# Patient Record
Sex: MALE | Race: BLACK OR AFRICAN AMERICAN | NOT HISPANIC OR LATINO
[De-identification: names, ages, dates, MRNs, and addresses within clinical notes are randomized per-mention and may not be internally consistent; named-entity substitution may affect disease eponyms.]

---

## 2017-02-15 ENCOUNTER — OTHER (OUTPATIENT)
Age: 47
End: 2017-02-15

## 2017-02-15 ENCOUNTER — APPOINTMENT (OUTPATIENT)
Dept: INTERNAL MEDICINE | Facility: CLINIC | Age: 47
End: 2017-02-15

## 2017-02-15 VITALS
OXYGEN SATURATION: 98 % | SYSTOLIC BLOOD PRESSURE: 144 MMHG | DIASTOLIC BLOOD PRESSURE: 80 MMHG | TEMPERATURE: 98.4 F | HEART RATE: 94 BPM | HEIGHT: 73 IN | BODY MASS INDEX: 41.75 KG/M2 | WEIGHT: 315 LBS

## 2017-02-17 LAB
GLUCOSE BLDC GLUCOMTR-MCNC: 176
HBA1C MFR BLD HPLC: 11.1

## 2017-03-13 ENCOUNTER — MEDICATION RENEWAL (OUTPATIENT)
Age: 47
End: 2017-03-13

## 2017-04-05 ENCOUNTER — APPOINTMENT (OUTPATIENT)
Dept: INTERNAL MEDICINE | Facility: CLINIC | Age: 47
End: 2017-04-05

## 2017-04-05 VITALS
HEART RATE: 86 BPM | DIASTOLIC BLOOD PRESSURE: 80 MMHG | WEIGHT: 315 LBS | OXYGEN SATURATION: 98 % | HEIGHT: 73 IN | SYSTOLIC BLOOD PRESSURE: 140 MMHG | BODY MASS INDEX: 41.75 KG/M2

## 2017-04-05 DIAGNOSIS — N52.9 MALE ERECTILE DYSFUNCTION, UNSPECIFIED: ICD-10-CM

## 2017-04-14 ENCOUNTER — MEDICATION RENEWAL (OUTPATIENT)
Age: 47
End: 2017-04-14

## 2017-05-26 ENCOUNTER — MEDICATION RENEWAL (OUTPATIENT)
Age: 47
End: 2017-05-26

## 2017-05-26 ENCOUNTER — RX RENEWAL (OUTPATIENT)
Age: 47
End: 2017-05-26

## 2017-05-31 ENCOUNTER — APPOINTMENT (OUTPATIENT)
Dept: INTERNAL MEDICINE | Facility: CLINIC | Age: 47
End: 2017-05-31

## 2017-05-31 VITALS
OXYGEN SATURATION: 98 % | TEMPERATURE: 98.7 F | BODY MASS INDEX: 41.75 KG/M2 | HEART RATE: 88 BPM | SYSTOLIC BLOOD PRESSURE: 140 MMHG | DIASTOLIC BLOOD PRESSURE: 80 MMHG | WEIGHT: 315 LBS | HEIGHT: 73 IN | RESPIRATION RATE: 16 BRPM

## 2017-06-02 LAB
GLUCOSE BLDC GLUCOMTR-MCNC: 312
HBA1C MFR BLD HPLC: 12.7

## 2017-06-28 ENCOUNTER — MEDICATION RENEWAL (OUTPATIENT)
Age: 47
End: 2017-06-28

## 2017-08-02 ENCOUNTER — OTHER (OUTPATIENT)
Age: 47
End: 2017-08-02

## 2017-08-02 ENCOUNTER — APPOINTMENT (OUTPATIENT)
Dept: INTERNAL MEDICINE | Facility: CLINIC | Age: 47
End: 2017-08-02
Payer: COMMERCIAL

## 2017-08-02 VITALS
BODY MASS INDEX: 41.75 KG/M2 | DIASTOLIC BLOOD PRESSURE: 80 MMHG | HEART RATE: 94 BPM | OXYGEN SATURATION: 97 % | WEIGHT: 315 LBS | SYSTOLIC BLOOD PRESSURE: 124 MMHG | HEIGHT: 73 IN

## 2017-08-02 PROCEDURE — 99213 OFFICE O/P EST LOW 20 MIN: CPT

## 2017-08-22 ENCOUNTER — MEDICATION RENEWAL (OUTPATIENT)
Age: 47
End: 2017-08-22

## 2017-09-11 ENCOUNTER — APPOINTMENT (OUTPATIENT)
Dept: INTERNAL MEDICINE | Facility: CLINIC | Age: 47
End: 2017-09-11
Payer: COMMERCIAL

## 2017-09-11 VITALS
OXYGEN SATURATION: 98 % | DIASTOLIC BLOOD PRESSURE: 80 MMHG | TEMPERATURE: 98.8 F | SYSTOLIC BLOOD PRESSURE: 130 MMHG | HEIGHT: 73 IN | WEIGHT: 315 LBS | BODY MASS INDEX: 41.75 KG/M2 | HEART RATE: 85 BPM

## 2017-09-11 PROCEDURE — 99213 OFFICE O/P EST LOW 20 MIN: CPT | Mod: 25

## 2017-09-11 PROCEDURE — 83036 HEMOGLOBIN GLYCOSYLATED A1C: CPT | Mod: QW

## 2017-09-11 PROCEDURE — 82962 GLUCOSE BLOOD TEST: CPT

## 2017-09-12 LAB
GLUCOSE BLDC GLUCOMTR-MCNC: 186
HBA1C MFR BLD HPLC: 10.6

## 2017-11-01 ENCOUNTER — APPOINTMENT (OUTPATIENT)
Dept: INTERNAL MEDICINE | Facility: CLINIC | Age: 47
End: 2017-11-01
Payer: COMMERCIAL

## 2017-11-01 PROCEDURE — 36415 COLL VENOUS BLD VENIPUNCTURE: CPT

## 2017-11-15 ENCOUNTER — NON-APPOINTMENT (OUTPATIENT)
Age: 47
End: 2017-11-15

## 2017-11-15 ENCOUNTER — APPOINTMENT (OUTPATIENT)
Dept: INTERNAL MEDICINE | Facility: CLINIC | Age: 47
End: 2017-11-15
Payer: COMMERCIAL

## 2017-11-15 VITALS
HEIGHT: 73 IN | HEART RATE: 91 BPM | OXYGEN SATURATION: 97 % | TEMPERATURE: 98.7 F | SYSTOLIC BLOOD PRESSURE: 130 MMHG | WEIGHT: 315 LBS | BODY MASS INDEX: 41.75 KG/M2 | DIASTOLIC BLOOD PRESSURE: 70 MMHG

## 2017-11-15 PROCEDURE — G0008: CPT

## 2017-11-15 PROCEDURE — 36415 COLL VENOUS BLD VENIPUNCTURE: CPT

## 2017-11-15 PROCEDURE — 94010 BREATHING CAPACITY TEST: CPT

## 2017-11-15 PROCEDURE — 90688 IIV4 VACCINE SPLT 0.5 ML IM: CPT

## 2017-11-15 PROCEDURE — 83036 HEMOGLOBIN GLYCOSYLATED A1C: CPT | Mod: QW

## 2017-11-15 PROCEDURE — 93000 ELECTROCARDIOGRAM COMPLETE: CPT

## 2017-11-15 PROCEDURE — 92552 PURE TONE AUDIOMETRY AIR: CPT

## 2017-11-15 PROCEDURE — 99396 PREV VISIT EST AGE 40-64: CPT | Mod: 25

## 2017-11-15 PROCEDURE — 82043 UR ALBUMIN QUANTITATIVE: CPT | Mod: QW

## 2017-11-17 ENCOUNTER — CLINICAL ADVICE (OUTPATIENT)
Age: 47
End: 2017-11-17

## 2017-11-17 ENCOUNTER — APPOINTMENT (OUTPATIENT)
Dept: INTERNAL MEDICINE | Facility: CLINIC | Age: 47
End: 2017-11-17
Payer: COMMERCIAL

## 2017-11-17 PROCEDURE — 86580 TB INTRADERMAL TEST: CPT

## 2017-12-07 ENCOUNTER — RX RENEWAL (OUTPATIENT)
Age: 47
End: 2017-12-07

## 2018-01-03 ENCOUNTER — APPOINTMENT (OUTPATIENT)
Dept: INTERNAL MEDICINE | Facility: CLINIC | Age: 48
End: 2018-01-03
Payer: COMMERCIAL

## 2018-01-03 ENCOUNTER — APPOINTMENT (OUTPATIENT)
Dept: INTERNAL MEDICINE | Facility: CLINIC | Age: 48
End: 2018-01-03

## 2018-01-04 ENCOUNTER — MEDICATION RENEWAL (OUTPATIENT)
Age: 48
End: 2018-01-04

## 2018-01-17 ENCOUNTER — OTHER (OUTPATIENT)
Age: 48
End: 2018-01-17

## 2018-01-17 ENCOUNTER — APPOINTMENT (OUTPATIENT)
Dept: INTERNAL MEDICINE | Facility: CLINIC | Age: 48
End: 2018-01-17
Payer: COMMERCIAL

## 2018-01-17 VITALS
OXYGEN SATURATION: 97 % | BODY MASS INDEX: 41.75 KG/M2 | WEIGHT: 315 LBS | DIASTOLIC BLOOD PRESSURE: 90 MMHG | HEART RATE: 80 BPM | SYSTOLIC BLOOD PRESSURE: 130 MMHG | HEIGHT: 73 IN

## 2018-01-17 PROCEDURE — 83036 HEMOGLOBIN GLYCOSYLATED A1C: CPT | Mod: QW

## 2018-01-17 PROCEDURE — 99213 OFFICE O/P EST LOW 20 MIN: CPT

## 2018-01-17 PROCEDURE — 82962 GLUCOSE BLOOD TEST: CPT

## 2018-01-18 LAB — GLUCOSE BLDC GLUCOMTR-MCNC: 215

## 2018-01-26 LAB — HBA1C MFR BLD HPLC: 10

## 2018-03-06 ENCOUNTER — APPOINTMENT (OUTPATIENT)
Dept: INTERNAL MEDICINE | Facility: CLINIC | Age: 48
End: 2018-03-06
Payer: COMMERCIAL

## 2018-03-06 ENCOUNTER — APPOINTMENT (OUTPATIENT)
Dept: DERMATOLOGY | Facility: CLINIC | Age: 48
End: 2018-03-06

## 2018-03-06 VITALS
SYSTOLIC BLOOD PRESSURE: 140 MMHG | TEMPERATURE: 98.4 F | BODY MASS INDEX: 41.75 KG/M2 | DIASTOLIC BLOOD PRESSURE: 84 MMHG | OXYGEN SATURATION: 97 % | HEART RATE: 80 BPM | HEIGHT: 73 IN | WEIGHT: 315 LBS

## 2018-03-06 PROCEDURE — 99214 OFFICE O/P EST MOD 30 MIN: CPT

## 2018-03-07 ENCOUNTER — OTHER (OUTPATIENT)
Age: 48
End: 2018-03-07

## 2018-03-21 ENCOUNTER — APPOINTMENT (OUTPATIENT)
Dept: DERMATOLOGY | Facility: CLINIC | Age: 48
End: 2018-03-21
Payer: COMMERCIAL

## 2018-03-21 PROCEDURE — 99203 OFFICE O/P NEW LOW 30 MIN: CPT

## 2018-04-04 RX ORDER — TRETINOIN 0.25 MG/G
0.03 CREAM TOPICAL DAILY
Qty: 1 | Refills: 2 | Status: ACTIVE | COMMUNITY
Start: 2018-03-21

## 2018-04-09 ENCOUNTER — OTHER (OUTPATIENT)
Age: 48
End: 2018-04-09

## 2018-04-09 ENCOUNTER — APPOINTMENT (OUTPATIENT)
Dept: INTERNAL MEDICINE | Facility: CLINIC | Age: 48
End: 2018-04-09
Payer: COMMERCIAL

## 2018-04-09 VITALS
OXYGEN SATURATION: 98 % | HEIGHT: 73 IN | SYSTOLIC BLOOD PRESSURE: 150 MMHG | HEART RATE: 90 BPM | WEIGHT: 315 LBS | BODY MASS INDEX: 41.75 KG/M2 | DIASTOLIC BLOOD PRESSURE: 90 MMHG

## 2018-04-09 PROCEDURE — 83036 HEMOGLOBIN GLYCOSYLATED A1C: CPT | Mod: QW

## 2018-04-09 PROCEDURE — 82962 GLUCOSE BLOOD TEST: CPT

## 2018-04-09 PROCEDURE — 99214 OFFICE O/P EST MOD 30 MIN: CPT | Mod: 25

## 2018-04-10 LAB
GLUCOSE BLDC GLUCOMTR-MCNC: 229
HBA1C MFR BLD HPLC: 10.9

## 2018-04-11 ENCOUNTER — APPOINTMENT (OUTPATIENT)
Dept: DERMATOLOGY | Facility: CLINIC | Age: 48
End: 2018-04-11
Payer: COMMERCIAL

## 2018-04-11 DIAGNOSIS — L73.9 FOLLICULAR DISORDER, UNSPECIFIED: ICD-10-CM

## 2018-04-11 DIAGNOSIS — L81.9 DISORDER OF PIGMENTATION, UNSPECIFIED: ICD-10-CM

## 2018-04-11 PROCEDURE — 99213 OFFICE O/P EST LOW 20 MIN: CPT

## 2018-04-11 RX ORDER — HYDROCORTISONE 1 %
12 CREAM (GRAM) TOPICAL
Refills: 0 | Status: ACTIVE | COMMUNITY

## 2018-04-12 ENCOUNTER — CHART COPY (OUTPATIENT)
Age: 48
End: 2018-04-12

## 2018-04-16 ENCOUNTER — CHART COPY (OUTPATIENT)
Age: 48
End: 2018-04-16

## 2018-04-23 ENCOUNTER — OTHER (OUTPATIENT)
Age: 48
End: 2018-04-23

## 2018-05-07 ENCOUNTER — RX RENEWAL (OUTPATIENT)
Age: 48
End: 2018-05-07

## 2018-05-09 ENCOUNTER — MEDICATION RENEWAL (OUTPATIENT)
Age: 48
End: 2018-05-09

## 2018-05-18 ENCOUNTER — APPOINTMENT (OUTPATIENT)
Dept: INTERNAL MEDICINE | Facility: CLINIC | Age: 48
End: 2018-05-18
Payer: COMMERCIAL

## 2018-05-18 VITALS
WEIGHT: 315 LBS | OXYGEN SATURATION: 97 % | SYSTOLIC BLOOD PRESSURE: 134 MMHG | HEART RATE: 86 BPM | BODY MASS INDEX: 41.75 KG/M2 | TEMPERATURE: 97.8 F | HEIGHT: 73 IN | DIASTOLIC BLOOD PRESSURE: 80 MMHG

## 2018-05-18 PROCEDURE — G0447 BEHAVIOR COUNSEL OBESITY 15M: CPT

## 2018-05-18 PROCEDURE — 99214 OFFICE O/P EST MOD 30 MIN: CPT | Mod: 25

## 2018-05-18 RX ORDER — CEPHALEXIN 500 MG/1
500 TABLET ORAL
Qty: 30 | Refills: 0 | Status: COMPLETED | COMMUNITY
Start: 2018-02-21

## 2018-05-18 RX ORDER — AMMONIUM LACTATE 12 %
12 CREAM (GRAM) TOPICAL
Qty: 280 | Refills: 0 | Status: COMPLETED | COMMUNITY
Start: 2018-02-27

## 2018-05-18 RX ORDER — FLUOCINONIDE 1 MG/G
0.1 CREAM TOPICAL
Qty: 60 | Refills: 0 | Status: COMPLETED | COMMUNITY
Start: 2018-02-22

## 2018-05-18 RX ORDER — TADALAFIL 10 MG/1
10 TABLET, FILM COATED ORAL
Qty: 18 | Refills: 0 | Status: COMPLETED | COMMUNITY
Start: 2018-01-03 | End: 1900-01-01

## 2018-06-20 ENCOUNTER — RX RENEWAL (OUTPATIENT)
Age: 48
End: 2018-06-20

## 2018-07-02 ENCOUNTER — APPOINTMENT (OUTPATIENT)
Dept: INTERNAL MEDICINE | Facility: CLINIC | Age: 48
End: 2018-07-02

## 2018-07-10 ENCOUNTER — APPOINTMENT (OUTPATIENT)
Dept: INTERNAL MEDICINE | Facility: CLINIC | Age: 48
End: 2018-07-10
Payer: COMMERCIAL

## 2018-07-10 VITALS
HEART RATE: 88 BPM | BODY MASS INDEX: 41.75 KG/M2 | OXYGEN SATURATION: 97 % | DIASTOLIC BLOOD PRESSURE: 60 MMHG | WEIGHT: 315 LBS | HEIGHT: 73 IN | SYSTOLIC BLOOD PRESSURE: 130 MMHG

## 2018-07-10 PROCEDURE — 99214 OFFICE O/P EST MOD 30 MIN: CPT

## 2018-07-10 NOTE — PHYSICAL EXAM
[No Acute Distress] : no acute distress [Normal Sclera/Conjunctiva] : normal sclera/conjunctiva [Normal Outer Ear/Nose] : the outer ears and nose were normal in appearance [No JVD] : no jugular venous distention [No Respiratory Distress] : no respiratory distress  [Clear to Auscultation] : lungs were clear to auscultation bilaterally [Normal Rate] : normal rate  [Regular Rhythm] : with a regular rhythm [Alert and Oriented x3] : oriented to person, place, and time [de-identified] : mo

## 2018-07-10 NOTE — HISTORY OF PRESENT ILLNESS
[FreeTextEntry1] : f/u to DM, HTN, and peripheral edema [de-identified] : pt is doing OK. His DM is being addressed as his insulin pump had broken and he has received a new pump and is waiting to have it programmed.\par His HTN is controlled and peripheral edema is also controlled

## 2018-07-18 ENCOUNTER — APPOINTMENT (OUTPATIENT)
Dept: DERMATOLOGY | Facility: CLINIC | Age: 48
End: 2018-07-18

## 2018-08-13 ENCOUNTER — APPOINTMENT (OUTPATIENT)
Dept: INTERNAL MEDICINE | Facility: CLINIC | Age: 48
End: 2018-08-13

## 2018-09-24 ENCOUNTER — APPOINTMENT (OUTPATIENT)
Dept: INTERNAL MEDICINE | Facility: CLINIC | Age: 48
End: 2018-09-24
Payer: COMMERCIAL

## 2018-09-24 VITALS
OXYGEN SATURATION: 98 % | SYSTOLIC BLOOD PRESSURE: 130 MMHG | DIASTOLIC BLOOD PRESSURE: 86 MMHG | HEIGHT: 73 IN | BODY MASS INDEX: 40.42 KG/M2 | HEART RATE: 88 BPM | WEIGHT: 305 LBS

## 2018-09-24 DIAGNOSIS — Z23 ENCOUNTER FOR IMMUNIZATION: ICD-10-CM

## 2018-09-24 PROCEDURE — 90686 IIV4 VACC NO PRSV 0.5 ML IM: CPT

## 2018-09-24 PROCEDURE — G0008: CPT

## 2018-09-24 PROCEDURE — 83036 HEMOGLOBIN GLYCOSYLATED A1C: CPT | Mod: QW

## 2018-09-24 PROCEDURE — 82962 GLUCOSE BLOOD TEST: CPT

## 2018-09-24 PROCEDURE — 99214 OFFICE O/P EST MOD 30 MIN: CPT | Mod: 25

## 2018-09-24 NOTE — HISTORY OF PRESENT ILLNESS
[FreeTextEntry1] : f/u to DM, HTN and Peripheral edema. [de-identified] : Pt DM to be checked today, HTN is controlled and the peripheral edema is improved.

## 2018-09-24 NOTE — PHYSICAL EXAM
[No Acute Distress] : no acute distress [Normal Sclera/Conjunctiva] : normal sclera/conjunctiva [Normal Outer Ear/Nose] : the outer ears and nose were normal in appearance [No JVD] : no jugular venous distention [No Respiratory Distress] : no respiratory distress  [Clear to Auscultation] : lungs were clear to auscultation bilaterally [Normal Rate] : normal rate  [Alert and Oriented x3] : oriented to person, place, and time [de-identified] : mo

## 2018-09-25 LAB
GLUCOSE BLDC GLUCOMTR-MCNC: 430
HBA1C MFR BLD HPLC: 12.1

## 2018-11-12 ENCOUNTER — APPOINTMENT (OUTPATIENT)
Dept: INTERNAL MEDICINE | Facility: CLINIC | Age: 48
End: 2018-11-12

## 2018-11-13 ENCOUNTER — APPOINTMENT (OUTPATIENT)
Dept: DERMATOLOGY | Facility: CLINIC | Age: 48
End: 2018-11-13
Payer: COMMERCIAL

## 2018-11-13 PROCEDURE — 99213 OFFICE O/P EST LOW 20 MIN: CPT

## 2018-12-20 ENCOUNTER — APPOINTMENT (OUTPATIENT)
Dept: INTERNAL MEDICINE | Facility: CLINIC | Age: 48
End: 2018-12-20
Payer: COMMERCIAL

## 2018-12-20 VITALS
TEMPERATURE: 99.1 F | HEIGHT: 73 IN | HEART RATE: 99 BPM | WEIGHT: 298 LBS | RESPIRATION RATE: 14 BRPM | SYSTOLIC BLOOD PRESSURE: 130 MMHG | DIASTOLIC BLOOD PRESSURE: 90 MMHG | BODY MASS INDEX: 39.49 KG/M2

## 2018-12-20 PROCEDURE — 99214 OFFICE O/P EST MOD 30 MIN: CPT

## 2018-12-20 NOTE — ASSESSMENT
[FreeTextEntry1] : acute bronchitis\par levaquin\par robitussin sugar free\par fs at home have been \par better has a pump\par do not want to give steroids and affect his glucose\par breo 200/50 inhalation\par should be fine\par no work until better\par

## 2018-12-20 NOTE — PHYSICAL EXAM

## 2018-12-20 NOTE — HISTORY OF PRESENT ILLNESS
[FreeTextEntry1] : cough congestion [de-identified] : dm\par with cough congestion for 3 to 4 days\par wheezing, no fever no sob\par but works outdoors in security\par he has new pump which is making his diabetes much more manageable\par

## 2019-01-02 ENCOUNTER — APPOINTMENT (OUTPATIENT)
Dept: INTERNAL MEDICINE | Facility: CLINIC | Age: 49
End: 2019-01-02
Payer: COMMERCIAL

## 2019-01-02 VITALS
SYSTOLIC BLOOD PRESSURE: 140 MMHG | OXYGEN SATURATION: 98 % | WEIGHT: 303 LBS | BODY MASS INDEX: 40.16 KG/M2 | DIASTOLIC BLOOD PRESSURE: 90 MMHG | HEIGHT: 73 IN | HEART RATE: 79 BPM

## 2019-01-02 DIAGNOSIS — Z87.09 PERSONAL HISTORY OF OTHER DISEASES OF THE RESPIRATORY SYSTEM: ICD-10-CM

## 2019-01-02 PROCEDURE — 99214 OFFICE O/P EST MOD 30 MIN: CPT | Mod: 25

## 2019-01-02 PROCEDURE — 82962 GLUCOSE BLOOD TEST: CPT

## 2019-01-02 PROCEDURE — 83036 HEMOGLOBIN GLYCOSYLATED A1C: CPT | Mod: QW

## 2019-01-04 ENCOUNTER — CLINICAL ADVICE (OUTPATIENT)
Age: 49
End: 2019-01-04

## 2019-01-04 LAB
GLUCOSE BLDC GLUCOMTR-MCNC: 278
HBA1C MFR BLD HPLC: 13

## 2019-01-21 ENCOUNTER — RX RENEWAL (OUTPATIENT)
Age: 49
End: 2019-01-21

## 2019-02-19 ENCOUNTER — APPOINTMENT (OUTPATIENT)
Dept: INTERNAL MEDICINE | Facility: CLINIC | Age: 49
End: 2019-02-19
Payer: COMMERCIAL

## 2019-02-19 VITALS
OXYGEN SATURATION: 98 % | RESPIRATION RATE: 16 BRPM | SYSTOLIC BLOOD PRESSURE: 160 MMHG | WEIGHT: 310 LBS | HEART RATE: 91 BPM | TEMPERATURE: 97.9 F | DIASTOLIC BLOOD PRESSURE: 100 MMHG | BODY MASS INDEX: 40.9 KG/M2

## 2019-02-19 PROCEDURE — 99213 OFFICE O/P EST LOW 20 MIN: CPT

## 2019-03-13 ENCOUNTER — MEDICATION RENEWAL (OUTPATIENT)
Age: 49
End: 2019-03-13

## 2019-03-20 ENCOUNTER — RX RENEWAL (OUTPATIENT)
Age: 49
End: 2019-03-20

## 2019-04-02 ENCOUNTER — APPOINTMENT (OUTPATIENT)
Dept: INTERNAL MEDICINE | Facility: CLINIC | Age: 49
End: 2019-04-02

## 2019-06-11 ENCOUNTER — APPOINTMENT (OUTPATIENT)
Dept: INTERNAL MEDICINE | Facility: CLINIC | Age: 49
End: 2019-06-11
Payer: COMMERCIAL

## 2019-06-11 VITALS
HEART RATE: 93 BPM | DIASTOLIC BLOOD PRESSURE: 96 MMHG | HEIGHT: 73 IN | SYSTOLIC BLOOD PRESSURE: 160 MMHG | WEIGHT: 311 LBS | BODY MASS INDEX: 41.22 KG/M2 | TEMPERATURE: 98.2 F | OXYGEN SATURATION: 97 %

## 2019-06-11 DIAGNOSIS — Z87.898 PERSONAL HISTORY OF OTHER SPECIFIED CONDITIONS: ICD-10-CM

## 2019-06-11 PROCEDURE — 83036 HEMOGLOBIN GLYCOSYLATED A1C: CPT | Mod: QW

## 2019-06-11 PROCEDURE — 82962 GLUCOSE BLOOD TEST: CPT

## 2019-06-11 PROCEDURE — 99214 OFFICE O/P EST MOD 30 MIN: CPT | Mod: 25

## 2019-06-11 RX ORDER — LEVOFLOXACIN 500 MG/1
500 TABLET, FILM COATED ORAL DAILY
Qty: 7 | Refills: 0 | Status: DISCONTINUED | COMMUNITY
Start: 2018-12-20 | End: 2019-06-11

## 2019-06-12 ENCOUNTER — CLINICAL ADVICE (OUTPATIENT)
Age: 49
End: 2019-06-12

## 2019-06-12 LAB
GLUCOSE BLDC GLUCOMTR-MCNC: 255
HBA1C MFR BLD HPLC: >14

## 2019-06-24 ENCOUNTER — MEDICATION RENEWAL (OUTPATIENT)
Age: 49
End: 2019-06-24

## 2019-07-03 ENCOUNTER — APPOINTMENT (OUTPATIENT)
Dept: DERMATOLOGY | Facility: CLINIC | Age: 49
End: 2019-07-03

## 2019-07-30 ENCOUNTER — APPOINTMENT (OUTPATIENT)
Dept: DERMATOLOGY | Facility: CLINIC | Age: 49
End: 2019-07-30

## 2019-07-31 ENCOUNTER — OTHER (OUTPATIENT)
Age: 49
End: 2019-07-31

## 2019-07-31 ENCOUNTER — APPOINTMENT (OUTPATIENT)
Dept: INTERNAL MEDICINE | Facility: CLINIC | Age: 49
End: 2019-07-31
Payer: COMMERCIAL

## 2019-07-31 VITALS
TEMPERATURE: 98.1 F | HEIGHT: 73 IN | DIASTOLIC BLOOD PRESSURE: 80 MMHG | BODY MASS INDEX: 41.48 KG/M2 | HEART RATE: 91 BPM | WEIGHT: 313 LBS | SYSTOLIC BLOOD PRESSURE: 160 MMHG | OXYGEN SATURATION: 97 %

## 2019-07-31 PROCEDURE — 99214 OFFICE O/P EST MOD 30 MIN: CPT

## 2019-09-03 ENCOUNTER — OTHER (OUTPATIENT)
Age: 49
End: 2019-09-03

## 2019-09-03 ENCOUNTER — APPOINTMENT (OUTPATIENT)
Dept: INTERNAL MEDICINE | Facility: CLINIC | Age: 49
End: 2019-09-03
Payer: COMMERCIAL

## 2019-09-03 VITALS
SYSTOLIC BLOOD PRESSURE: 120 MMHG | HEART RATE: 90 BPM | OXYGEN SATURATION: 97 % | HEIGHT: 73 IN | BODY MASS INDEX: 40.29 KG/M2 | WEIGHT: 304 LBS | DIASTOLIC BLOOD PRESSURE: 78 MMHG

## 2019-09-03 PROCEDURE — 82962 GLUCOSE BLOOD TEST: CPT

## 2019-09-03 PROCEDURE — 83036 HEMOGLOBIN GLYCOSYLATED A1C: CPT | Mod: QW

## 2019-09-03 PROCEDURE — 99214 OFFICE O/P EST MOD 30 MIN: CPT | Mod: 25

## 2019-09-03 RX ORDER — PEN NEEDLE, DIABETIC 29 G X1/2"
31G X 5 MM NEEDLE, DISPOSABLE MISCELLANEOUS
Qty: 30 | Refills: 1 | Status: ACTIVE | COMMUNITY
Start: 2019-09-03 | End: 1900-01-01

## 2019-09-04 LAB
GLUCOSE BLDC GLUCOMTR-MCNC: 208
HBA1C MFR BLD HPLC: 9.3

## 2019-09-13 ENCOUNTER — MEDICATION RENEWAL (OUTPATIENT)
Age: 49
End: 2019-09-13

## 2019-10-15 ENCOUNTER — APPOINTMENT (OUTPATIENT)
Dept: INTERNAL MEDICINE | Facility: CLINIC | Age: 49
End: 2019-10-15
Payer: COMMERCIAL

## 2019-10-15 VITALS
DIASTOLIC BLOOD PRESSURE: 94 MMHG | HEIGHT: 73 IN | OXYGEN SATURATION: 98 % | BODY MASS INDEX: 40.42 KG/M2 | SYSTOLIC BLOOD PRESSURE: 140 MMHG | WEIGHT: 305 LBS | HEART RATE: 74 BPM

## 2019-10-15 PROCEDURE — 99214 OFFICE O/P EST MOD 30 MIN: CPT | Mod: 25

## 2019-10-15 PROCEDURE — 82962 GLUCOSE BLOOD TEST: CPT

## 2019-10-16 LAB — GLUCOSE BLDC GLUCOMTR-MCNC: 176

## 2019-11-23 ENCOUNTER — RX RENEWAL (OUTPATIENT)
Age: 49
End: 2019-11-23

## 2019-11-23 RX ORDER — TRIAMCINOLONE ACETONIDE 1 MG/G
0.1 OINTMENT TOPICAL TWICE DAILY
Qty: 1 | Refills: 2 | Status: ACTIVE | COMMUNITY
Start: 2018-03-21 | End: 1900-01-01

## 2019-11-25 ENCOUNTER — APPOINTMENT (OUTPATIENT)
Dept: DERMATOLOGY | Facility: CLINIC | Age: 49
End: 2019-11-25

## 2019-11-26 ENCOUNTER — APPOINTMENT (OUTPATIENT)
Dept: INTERNAL MEDICINE | Facility: CLINIC | Age: 49
End: 2019-11-26

## 2019-12-11 ENCOUNTER — CHART COPY (OUTPATIENT)
Age: 49
End: 2019-12-11

## 2020-01-04 ENCOUNTER — MEDICATION RENEWAL (OUTPATIENT)
Age: 50
End: 2020-01-04

## 2020-01-06 ENCOUNTER — APPOINTMENT (OUTPATIENT)
Dept: DERMATOLOGY | Facility: CLINIC | Age: 50
End: 2020-01-06

## 2020-01-29 ENCOUNTER — RX RENEWAL (OUTPATIENT)
Age: 50
End: 2020-01-29

## 2020-02-05 ENCOUNTER — APPOINTMENT (OUTPATIENT)
Dept: INTERNAL MEDICINE | Facility: CLINIC | Age: 50
End: 2020-02-05
Payer: COMMERCIAL

## 2020-02-05 VITALS
HEART RATE: 78 BPM | SYSTOLIC BLOOD PRESSURE: 134 MMHG | DIASTOLIC BLOOD PRESSURE: 80 MMHG | HEIGHT: 73 IN | BODY MASS INDEX: 41.62 KG/M2 | OXYGEN SATURATION: 98 % | WEIGHT: 314 LBS

## 2020-02-05 DIAGNOSIS — R42 DIZZINESS AND GIDDINESS: ICD-10-CM

## 2020-02-05 PROCEDURE — 99214 OFFICE O/P EST MOD 30 MIN: CPT

## 2020-02-05 PROCEDURE — 82962 GLUCOSE BLOOD TEST: CPT

## 2020-02-05 PROCEDURE — 83036 HEMOGLOBIN GLYCOSYLATED A1C: CPT | Mod: QW

## 2020-02-07 LAB
GLUCOSE BLDC GLUCOMTR-MCNC: 188
HBA1C MFR BLD HPLC: 7.3

## 2020-02-18 ENCOUNTER — APPOINTMENT (OUTPATIENT)
Dept: INTERNAL MEDICINE | Facility: CLINIC | Age: 50
End: 2020-02-18
Payer: COMMERCIAL

## 2020-02-18 VITALS
RESPIRATION RATE: 16 BRPM | BODY MASS INDEX: 40.9 KG/M2 | OXYGEN SATURATION: 98 % | TEMPERATURE: 98.1 F | HEART RATE: 87 BPM | WEIGHT: 310 LBS | DIASTOLIC BLOOD PRESSURE: 80 MMHG | SYSTOLIC BLOOD PRESSURE: 130 MMHG

## 2020-02-18 DIAGNOSIS — H61.23 IMPACTED CERUMEN, BILATERAL: ICD-10-CM

## 2020-02-18 PROCEDURE — 99213 OFFICE O/P EST LOW 20 MIN: CPT

## 2020-02-19 PROBLEM — H61.23 BILATERAL IMPACTED CERUMEN: Status: ACTIVE | Noted: 2020-02-19

## 2020-05-04 ENCOUNTER — APPOINTMENT (OUTPATIENT)
Dept: INTERNAL MEDICINE | Facility: CLINIC | Age: 50
End: 2020-05-04
Payer: COMMERCIAL

## 2020-05-04 ENCOUNTER — NON-APPOINTMENT (OUTPATIENT)
Age: 50
End: 2020-05-04

## 2020-05-04 VITALS
WEIGHT: 312 LBS | BODY MASS INDEX: 41.35 KG/M2 | HEIGHT: 73 IN | DIASTOLIC BLOOD PRESSURE: 78 MMHG | SYSTOLIC BLOOD PRESSURE: 142 MMHG | HEART RATE: 90 BPM | OXYGEN SATURATION: 97 %

## 2020-05-04 PROCEDURE — 93000 ELECTROCARDIOGRAM COMPLETE: CPT

## 2020-05-04 PROCEDURE — 83036 HEMOGLOBIN GLYCOSYLATED A1C: CPT | Mod: QW

## 2020-05-04 PROCEDURE — 92552 PURE TONE AUDIOMETRY AIR: CPT

## 2020-05-04 PROCEDURE — 36415 COLL VENOUS BLD VENIPUNCTURE: CPT

## 2020-05-04 PROCEDURE — 82043 UR ALBUMIN QUANTITATIVE: CPT | Mod: QW

## 2020-05-04 PROCEDURE — 99396 PREV VISIT EST AGE 40-64: CPT | Mod: 25

## 2020-05-05 LAB
24R-OH-CALCIDIOL SERPL-MCNC: 52.6 PG/ML
ALBUMIN SERPL ELPH-MCNC: 4.5 G/DL
ALBUMIN: 80
ALP BLD-CCNC: 72 U/L
ALT SERPL-CCNC: 22 U/L
ANION GAP SERPL CALC-SCNC: 18 MMOL/L
APPEARANCE: CLEAR
AST SERPL-CCNC: 17 U/L
BASOPHILS # BLD AUTO: 0.03 K/UL
BASOPHILS NFR BLD AUTO: 0.6 %
BILIRUB SERPL-MCNC: 0.2 MG/DL
BILIRUBIN URINE: NEGATIVE
BLOOD URINE: NEGATIVE
BUN SERPL-MCNC: 20 MG/DL
CALCIUM SERPL-MCNC: 9.9 MG/DL
CHLORIDE SERPL-SCNC: 100 MMOL/L
CHOLEST SERPL-MCNC: 181 MG/DL
CHOLEST/HDLC SERPL: 3.6 RATIO
CK SERPL-CCNC: 126 U/L
CO2 SERPL-SCNC: 22 MMOL/L
COLOR: YELLOW
CREAT SERPL-MCNC: 1.11 MG/DL
CREATININE: 200
EOSINOPHIL # BLD AUTO: 0.07 K/UL
EOSINOPHIL NFR BLD AUTO: 1.4 %
GLUCOSE QUALITATIVE U: ABNORMAL
GLUCOSE SERPL-MCNC: 272 MG/DL
HBA1C MFR BLD HPLC: 8.9
HCT VFR BLD CALC: 42.5 %
HDLC SERPL-MCNC: 50 MG/DL
HGB BLD-MCNC: 13.6 G/DL
HIV1+2 AB SPEC QL IA.RAPID: NONREACTIVE
IMM GRANULOCYTES NFR BLD AUTO: 0.2 %
KETONES URINE: NEGATIVE
LDLC SERPL CALC-MCNC: 106 MG/DL
LEUKOCYTE ESTERASE URINE: NEGATIVE
LYMPHOCYTES # BLD AUTO: 1.5 K/UL
LYMPHOCYTES NFR BLD AUTO: 30.5 %
MAN DIFF?: NORMAL
MCHC RBC-ENTMCNC: 27.8 PG
MCHC RBC-ENTMCNC: 32 GM/DL
MCV RBC AUTO: 86.7 FL
MICROALBUMIN/CREAT UR TEST STR-RTO: NORMAL
MONOCYTES # BLD AUTO: 0.33 K/UL
MONOCYTES NFR BLD AUTO: 6.7 %
NEUTROPHILS # BLD AUTO: 2.97 K/UL
NEUTROPHILS NFR BLD AUTO: 60.6 %
NITRITE URINE: NEGATIVE
PH URINE: 6
PLATELET # BLD AUTO: 248 K/UL
POTASSIUM SERPL-SCNC: 4.3 MMOL/L
PROT SERPL-MCNC: 7.5 G/DL
PROTEIN URINE: NORMAL
RBC # BLD: 4.9 M/UL
RBC # FLD: 13.5 %
SODIUM SERPL-SCNC: 139 MMOL/L
SPECIFIC GRAVITY URINE: 1.03
TRIGL SERPL-MCNC: 122 MG/DL
TSH SERPL-ACNC: 1.84 UIU/ML
URATE SERPL-MCNC: 6.1 MG/DL
UROBILINOGEN URINE: NORMAL
WBC # FLD AUTO: 4.91 K/UL

## 2020-06-08 ENCOUNTER — RX RENEWAL (OUTPATIENT)
Age: 50
End: 2020-06-08

## 2020-06-10 ENCOUNTER — APPOINTMENT (OUTPATIENT)
Dept: INTERNAL MEDICINE | Facility: CLINIC | Age: 50
End: 2020-06-10
Payer: COMMERCIAL

## 2020-06-10 VITALS
SYSTOLIC BLOOD PRESSURE: 132 MMHG | DIASTOLIC BLOOD PRESSURE: 80 MMHG | TEMPERATURE: 98.2 F | HEART RATE: 84 BPM | WEIGHT: 308 LBS | HEIGHT: 73 IN | BODY MASS INDEX: 40.82 KG/M2 | OXYGEN SATURATION: 96 %

## 2020-06-10 PROCEDURE — 99214 OFFICE O/P EST MOD 30 MIN: CPT

## 2020-08-12 ENCOUNTER — APPOINTMENT (OUTPATIENT)
Dept: INTERNAL MEDICINE | Facility: CLINIC | Age: 50
End: 2020-08-12
Payer: COMMERCIAL

## 2020-08-12 VITALS
OXYGEN SATURATION: 96 % | HEIGHT: 73 IN | SYSTOLIC BLOOD PRESSURE: 128 MMHG | WEIGHT: 311 LBS | HEART RATE: 85 BPM | BODY MASS INDEX: 41.22 KG/M2 | TEMPERATURE: 98.4 F | DIASTOLIC BLOOD PRESSURE: 78 MMHG

## 2020-08-12 DIAGNOSIS — Z11.59 ENCOUNTER FOR SCREENING FOR OTHER VIRAL DISEASES: ICD-10-CM

## 2020-08-12 PROCEDURE — 83036 HEMOGLOBIN GLYCOSYLATED A1C: CPT | Mod: QW

## 2020-08-12 PROCEDURE — 36415 COLL VENOUS BLD VENIPUNCTURE: CPT

## 2020-08-12 PROCEDURE — 82962 GLUCOSE BLOOD TEST: CPT

## 2020-08-12 PROCEDURE — 99214 OFFICE O/P EST MOD 30 MIN: CPT | Mod: 25

## 2020-08-14 LAB
GLUCOSE BLDC GLUCOMTR-MCNC: 134
HBA1C MFR BLD HPLC: 8.1
SARS-COV-2 IGG SERPL IA-ACNC: <3.8 AU/ML
SARS-COV-2 IGG SERPL QL IA: NEGATIVE

## 2020-08-19 ENCOUNTER — RX RENEWAL (OUTPATIENT)
Age: 50
End: 2020-08-19

## 2020-09-15 ENCOUNTER — APPOINTMENT (OUTPATIENT)
Dept: INTERNAL MEDICINE | Facility: CLINIC | Age: 50
End: 2020-09-15
Payer: COMMERCIAL

## 2020-09-15 VITALS
OXYGEN SATURATION: 98 % | HEART RATE: 82 BPM | HEIGHT: 73 IN | BODY MASS INDEX: 40.42 KG/M2 | SYSTOLIC BLOOD PRESSURE: 134 MMHG | TEMPERATURE: 98.7 F | WEIGHT: 305 LBS | DIASTOLIC BLOOD PRESSURE: 80 MMHG

## 2020-09-15 PROCEDURE — 99214 OFFICE O/P EST MOD 30 MIN: CPT

## 2020-10-14 ENCOUNTER — APPOINTMENT (OUTPATIENT)
Dept: INTERNAL MEDICINE | Facility: CLINIC | Age: 50
End: 2020-10-14
Payer: COMMERCIAL

## 2020-10-14 VITALS
DIASTOLIC BLOOD PRESSURE: 80 MMHG | BODY MASS INDEX: 41.75 KG/M2 | HEIGHT: 73 IN | WEIGHT: 315 LBS | OXYGEN SATURATION: 98 % | SYSTOLIC BLOOD PRESSURE: 132 MMHG | TEMPERATURE: 98 F | HEART RATE: 87 BPM

## 2020-10-14 DIAGNOSIS — Z11.1 ENCOUNTER FOR SCREENING FOR RESPIRATORY TUBERCULOSIS: ICD-10-CM

## 2020-10-14 PROCEDURE — 90686 IIV4 VACC NO PRSV 0.5 ML IM: CPT

## 2020-10-14 PROCEDURE — 99214 OFFICE O/P EST MOD 30 MIN: CPT | Mod: 25

## 2020-10-14 PROCEDURE — G0008: CPT

## 2020-10-28 ENCOUNTER — RX RENEWAL (OUTPATIENT)
Age: 50
End: 2020-10-28

## 2020-11-13 ENCOUNTER — APPOINTMENT (OUTPATIENT)
Dept: INTERNAL MEDICINE | Facility: CLINIC | Age: 50
End: 2020-11-13
Payer: COMMERCIAL

## 2020-11-13 VITALS
HEART RATE: 87 BPM | OXYGEN SATURATION: 98 % | BODY MASS INDEX: 41.22 KG/M2 | TEMPERATURE: 97.6 F | HEIGHT: 73 IN | DIASTOLIC BLOOD PRESSURE: 100 MMHG | WEIGHT: 311 LBS | SYSTOLIC BLOOD PRESSURE: 160 MMHG

## 2020-11-13 DIAGNOSIS — R06.02 SHORTNESS OF BREATH: ICD-10-CM

## 2020-11-13 PROCEDURE — 99214 OFFICE O/P EST MOD 30 MIN: CPT | Mod: 25

## 2020-11-13 PROCEDURE — 99072 ADDL SUPL MATRL&STAF TM PHE: CPT

## 2020-11-13 PROCEDURE — 83036 HEMOGLOBIN GLYCOSYLATED A1C: CPT | Mod: QW

## 2020-11-13 PROCEDURE — 36415 COLL VENOUS BLD VENIPUNCTURE: CPT

## 2020-11-13 PROCEDURE — 82962 GLUCOSE BLOOD TEST: CPT

## 2020-11-16 LAB
ALBUMIN SERPL ELPH-MCNC: 4.7 G/DL
ALP BLD-CCNC: 79 U/L
ALT SERPL-CCNC: 30 U/L
ANION GAP SERPL CALC-SCNC: 16 MMOL/L
AST SERPL-CCNC: 23 U/L
BASOPHILS # BLD AUTO: 0.03 K/UL
BASOPHILS NFR BLD AUTO: 0.5 %
BILIRUB SERPL-MCNC: 0.3 MG/DL
BUN SERPL-MCNC: 15 MG/DL
CALCIUM SERPL-MCNC: 9.6 MG/DL
CHLORIDE SERPL-SCNC: 99 MMOL/L
CO2 SERPL-SCNC: 25 MMOL/L
CREAT SERPL-MCNC: 1.14 MG/DL
EOSINOPHIL # BLD AUTO: 0.08 K/UL
EOSINOPHIL NFR BLD AUTO: 1.3 %
GLUCOSE BLDC GLUCOMTR-MCNC: 128
GLUCOSE SERPL-MCNC: 136 MG/DL
HBA1C MFR BLD HPLC: 8.3
HCT VFR BLD CALC: 43.9 %
HGB BLD-MCNC: 14 G/DL
IMM GRANULOCYTES NFR BLD AUTO: 0.3 %
LYMPHOCYTES # BLD AUTO: 2.24 K/UL
LYMPHOCYTES NFR BLD AUTO: 37 %
MAN DIFF?: NORMAL
MCHC RBC-ENTMCNC: 28.1 PG
MCHC RBC-ENTMCNC: 31.9 GM/DL
MCV RBC AUTO: 88 FL
MONOCYTES # BLD AUTO: 0.39 K/UL
MONOCYTES NFR BLD AUTO: 6.4 %
NEUTROPHILS # BLD AUTO: 3.29 K/UL
NEUTROPHILS NFR BLD AUTO: 54.5 %
PLATELET # BLD AUTO: 289 K/UL
POTASSIUM SERPL-SCNC: 3.8 MMOL/L
PROT SERPL-MCNC: 7.2 G/DL
RBC # BLD: 4.99 M/UL
RBC # FLD: 13.5 %
SODIUM SERPL-SCNC: 140 MMOL/L
WBC # FLD AUTO: 6.05 K/UL

## 2020-12-04 ENCOUNTER — APPOINTMENT (OUTPATIENT)
Dept: INTERNAL MEDICINE | Facility: CLINIC | Age: 50
End: 2020-12-04
Payer: COMMERCIAL

## 2020-12-04 VITALS
DIASTOLIC BLOOD PRESSURE: 96 MMHG | HEIGHT: 73 IN | OXYGEN SATURATION: 98 % | HEART RATE: 86 BPM | BODY MASS INDEX: 41.22 KG/M2 | WEIGHT: 311 LBS | SYSTOLIC BLOOD PRESSURE: 170 MMHG | TEMPERATURE: 96 F

## 2020-12-04 PROCEDURE — 99072 ADDL SUPL MATRL&STAF TM PHE: CPT

## 2020-12-04 PROCEDURE — 99214 OFFICE O/P EST MOD 30 MIN: CPT

## 2020-12-04 NOTE — HISTORY OF PRESENT ILLNESS
[FreeTextEntry1] : f/u to DM, HTN, and neuropathy [de-identified] : the A1c is must better and it was 8.3 in November.  the BP is still elevated but the diastolic appears to be a little improved. the neuropathy persists but is much better. He is still having an elevated BP and he needs to change his BP medication. He also needs to watch his diet.  He is on disability and is not able to go back to work at this time.

## 2020-12-04 NOTE — PHYSICAL EXAM
[No Acute Distress] : no acute distress [Well Nourished] : well nourished [Well Developed] : well developed [Normal Sclera/Conjunctiva] : normal sclera/conjunctiva [Normal Outer Ear/Nose] : the outer ears and nose were normal in appearance [No JVD] : no jugular venous distention [No Respiratory Distress] : no respiratory distress  [Normal Rate] : normal rate  [Regular Rhythm] : with a regular rhythm [No Carotid Bruits] : no carotid bruits [Alert and Oriented x3] : oriented to person, place, and time

## 2020-12-04 NOTE — ASSESSMENT
[FreeTextEntry1] : His BP is elevated and he needs a stronger diuretic for the BP. The DM is better controlled. the neuropathy is controlled and he is feeling OK.

## 2020-12-19 ENCOUNTER — RX RENEWAL (OUTPATIENT)
Age: 50
End: 2020-12-19

## 2021-01-18 ENCOUNTER — APPOINTMENT (OUTPATIENT)
Dept: INTERNAL MEDICINE | Facility: CLINIC | Age: 51
End: 2021-01-18
Payer: COMMERCIAL

## 2021-01-18 VITALS
HEART RATE: 84 BPM | OXYGEN SATURATION: 96 % | BODY MASS INDEX: 41.75 KG/M2 | SYSTOLIC BLOOD PRESSURE: 130 MMHG | DIASTOLIC BLOOD PRESSURE: 90 MMHG | TEMPERATURE: 97.2 F | HEIGHT: 73 IN | WEIGHT: 315 LBS

## 2021-01-18 PROCEDURE — 99072 ADDL SUPL MATRL&STAF TM PHE: CPT

## 2021-01-18 PROCEDURE — 83036 HEMOGLOBIN GLYCOSYLATED A1C: CPT | Mod: QW

## 2021-01-18 PROCEDURE — 82962 GLUCOSE BLOOD TEST: CPT

## 2021-01-18 PROCEDURE — 99214 OFFICE O/P EST MOD 30 MIN: CPT | Mod: 25

## 2021-01-18 RX ORDER — CHLORHEXIDINE GLUCONATE, 0.12% ORAL RINSE 1.2 MG/ML
0.12 SOLUTION DENTAL
Qty: 473 | Refills: 0 | Status: COMPLETED | COMMUNITY
Start: 2021-01-13

## 2021-01-19 LAB
GLUCOSE BLDC GLUCOMTR-MCNC: 107
HBA1C MFR BLD HPLC: 7.6

## 2021-02-19 RX ORDER — SEMAGLUTIDE 1.34 MG/ML
2 INJECTION, SOLUTION SUBCUTANEOUS
Qty: 1 | Refills: 2 | Status: DISCONTINUED | COMMUNITY
Start: 2019-07-31 | End: 2021-02-19

## 2021-02-23 ENCOUNTER — APPOINTMENT (OUTPATIENT)
Dept: INTERNAL MEDICINE | Facility: CLINIC | Age: 51
End: 2021-02-23
Payer: COMMERCIAL

## 2021-02-23 VITALS
OXYGEN SATURATION: 97 % | HEART RATE: 90 BPM | BODY MASS INDEX: 41.75 KG/M2 | HEIGHT: 73 IN | DIASTOLIC BLOOD PRESSURE: 80 MMHG | WEIGHT: 315 LBS | TEMPERATURE: 98.3 F | SYSTOLIC BLOOD PRESSURE: 140 MMHG

## 2021-02-23 PROCEDURE — 99072 ADDL SUPL MATRL&STAF TM PHE: CPT

## 2021-02-23 PROCEDURE — 99213 OFFICE O/P EST LOW 20 MIN: CPT

## 2021-02-24 ENCOUNTER — APPOINTMENT (OUTPATIENT)
Dept: ENDOCRINOLOGY | Facility: CLINIC | Age: 51
End: 2021-02-24

## 2021-03-02 ENCOUNTER — APPOINTMENT (OUTPATIENT)
Dept: ENDOCRINOLOGY | Facility: CLINIC | Age: 51
End: 2021-03-02
Payer: COMMERCIAL

## 2021-03-02 VITALS
OXYGEN SATURATION: 98 % | WEIGHT: 315 LBS | BODY MASS INDEX: 41.75 KG/M2 | TEMPERATURE: 98.1 F | SYSTOLIC BLOOD PRESSURE: 160 MMHG | DIASTOLIC BLOOD PRESSURE: 98 MMHG | RESPIRATION RATE: 16 BRPM | HEIGHT: 73 IN | HEART RATE: 96 BPM

## 2021-03-02 DIAGNOSIS — E55.9 VITAMIN D DEFICIENCY, UNSPECIFIED: ICD-10-CM

## 2021-03-02 PROCEDURE — 99205 OFFICE O/P NEW HI 60 MIN: CPT

## 2021-03-02 PROCEDURE — 99072 ADDL SUPL MATRL&STAF TM PHE: CPT

## 2021-03-24 ENCOUNTER — APPOINTMENT (OUTPATIENT)
Dept: ENDOCRINOLOGY | Facility: CLINIC | Age: 51
End: 2021-03-24
Payer: COMMERCIAL

## 2021-03-24 PROCEDURE — G0108 DIAB MANAGE TRN  PER INDIV: CPT

## 2021-03-24 PROCEDURE — 99072 ADDL SUPL MATRL&STAF TM PHE: CPT

## 2021-03-26 ENCOUNTER — LABORATORY RESULT (OUTPATIENT)
Age: 51
End: 2021-03-26

## 2021-03-26 ENCOUNTER — APPOINTMENT (OUTPATIENT)
Dept: INTERNAL MEDICINE | Facility: CLINIC | Age: 51
End: 2021-03-26
Payer: COMMERCIAL

## 2021-03-26 VITALS
TEMPERATURE: 98.3 F | DIASTOLIC BLOOD PRESSURE: 96 MMHG | HEART RATE: 87 BPM | WEIGHT: 310 LBS | OXYGEN SATURATION: 98 % | SYSTOLIC BLOOD PRESSURE: 140 MMHG | BODY MASS INDEX: 41.08 KG/M2 | HEIGHT: 73 IN

## 2021-03-26 PROCEDURE — 83036 HEMOGLOBIN GLYCOSYLATED A1C: CPT | Mod: QW

## 2021-03-26 PROCEDURE — 82962 GLUCOSE BLOOD TEST: CPT

## 2021-03-26 PROCEDURE — 99072 ADDL SUPL MATRL&STAF TM PHE: CPT

## 2021-03-26 PROCEDURE — 99214 OFFICE O/P EST MOD 30 MIN: CPT | Mod: 25

## 2021-03-30 ENCOUNTER — NON-APPOINTMENT (OUTPATIENT)
Age: 51
End: 2021-03-30

## 2021-03-30 LAB
ANION GAP SERPL CALC-SCNC: 13 MMOL/L
BUN SERPL-MCNC: 15 MG/DL
CALCIUM SERPL-MCNC: 10.7 MG/DL
CHLORIDE SERPL-SCNC: 99 MMOL/L
CO2 SERPL-SCNC: 27 MMOL/L
CREAT SERPL-MCNC: 0.99 MG/DL
GLUCOSE SERPL-MCNC: 196 MG/DL
HBA1C MFR BLD HPLC: 8.6
POTASSIUM SERPL-SCNC: 4.3 MMOL/L
SODIUM SERPL-SCNC: 140 MMOL/L

## 2021-04-01 ENCOUNTER — NON-APPOINTMENT (OUTPATIENT)
Age: 51
End: 2021-04-01

## 2021-04-06 ENCOUNTER — APPOINTMENT (OUTPATIENT)
Dept: INTERNAL MEDICINE | Facility: CLINIC | Age: 51
End: 2021-04-06

## 2021-04-06 ENCOUNTER — NON-APPOINTMENT (OUTPATIENT)
Age: 51
End: 2021-04-06

## 2021-04-09 LAB — CALCIUM SERPL-MCNC: 9.5 MG/DL

## 2021-04-20 ENCOUNTER — APPOINTMENT (OUTPATIENT)
Dept: ENDOCRINOLOGY | Facility: CLINIC | Age: 51
End: 2021-04-20

## 2021-05-11 ENCOUNTER — APPOINTMENT (OUTPATIENT)
Dept: ENDOCRINOLOGY | Facility: CLINIC | Age: 51
End: 2021-05-11

## 2021-05-24 ENCOUNTER — RX RENEWAL (OUTPATIENT)
Age: 51
End: 2021-05-24

## 2021-06-02 ENCOUNTER — APPOINTMENT (OUTPATIENT)
Dept: ENDOCRINOLOGY | Facility: CLINIC | Age: 51
End: 2021-06-02

## 2021-06-03 NOTE — HISTORY OF PRESENT ILLNESS
[FreeTextEntry1] : quality: dm2 \par onset 2000\par severity: moderate \par modifying factors: diet helps and exercise \par associated factors: HTN, HLD \par

## 2021-06-03 NOTE — PHYSICAL EXAM
[Alert] : alert [Well Nourished] : well nourished [No Acute Distress] : no acute distress [Well Developed] : well developed [Normal Sclera/Conjunctiva] : normal sclera/conjunctiva [EOMI] : extra ocular movement intact [No Proptosis] : no proptosis [Normal Oropharynx] : the oropharynx was normal [Thyroid Not Enlarged] : the thyroid was not enlarged [No Thyroid Nodules] : no palpable thyroid nodules [No Respiratory Distress] : no respiratory distress [No Accessory Muscle Use] : no accessory muscle use [Clear to Auscultation] : lungs were clear to auscultation bilaterally [Normal S1, S2] : normal S1 and S2 [Normal Rate] : heart rate was normal [Regular Rhythm] : with a regular rhythm [No Edema] : no peripheral edema [Pedal Pulses Normal] : the pedal pulses are present [Normal Bowel Sounds] : normal bowel sounds [Not Tender] : non-tender [Not Distended] : not distended [Soft] : abdomen soft [Normal Anterior Cervical Nodes] : no anterior cervical lymphadenopathy [Normal Posterior Cervical Nodes] : no posterior cervical lymphadenopathy [Spine Straight] : spine straight [No Stigmata of Cushings Syndrome] : no stigmata of Cushings Syndrome [Normal Gait] : normal gait [No Tremors] : no tremors [Oriented x3] : oriented to person, place, and time [Obese] : obese [Acanthosis Nigricans] : acanthosis nigricans present [de-identified] : acanthosis eyes with edema B/L lids ( allergies)  [de-identified] : R foot stasis dermatitis

## 2021-06-03 NOTE — ADDENDUM
[FreeTextEntry1] : To further explain,  this patient with diabetes performs 4 glucose checks per day utilizing a home blood glucose monitor. The patient is treated with insulin via a subcutaneous insulin infusion pump. This patient requires frequent adjustments to their insulin treatment on the basis of home blood glucose monitoring results via adjusting insulin pump settings. In addition, the patient has been to our office for an evaluation of their diabetes control within the past 6 months. \par \par

## 2021-06-03 NOTE — ASSESSMENT
[Carbohydrate Consistent Diet] : carbohydrate consistent diet [Exercise/Effect on Glucose] : exercise/effect on glucose [Self Monitoring of Blood Glucose] : self monitoring of blood glucose [Weight Loss] : weight loss [FreeTextEntry1] : DM2 mildly uncontrolled in patient with HTN,. HLD and obesity. He has medtronic pump and no CGM. He uses novolog.  \par \par DM2 : he has  medtronic pump. ICR ratio 1:5 and SF 10. Basal rate 2.25 u /hr \par bgs am = 146  lunch = 157  dinner = 162  bedtime 127-128 \par continue trulicity 1.5 mg weekly \par start  mg BID \par continue current dose of basal insulins\par for meals patient seems to be vague .He states he enter numbers 12-25-50 for carbs ?? in pump \par he declines Covid vaccine because PCP told him not to \par he has " [facility] [place]" to help him but he got flushot \par discussed diet and exercise\par encouraged more exercise walking 30 min 3 x week\par Discussed complications of diabetes at length including MI/CVA/ESRD/dialysis/blindness/amputations/infections\par See CDE for diet teaching\par Needs to try to have more protein for meals\par Importance of blood glucose monitoring discussed. Targets reviewed. Recommended pt try to keep blood glucose level below 130 (110) before meals and below 160 (140) two hours after meals.\par GFR normal. continue ARB \par continue MVI supplements\par he has neuropathy: monitor clinically. \par eye exam 2021: reportedly no DR \par \par HTN ;  bp at target on meds. Continue current management.\par I advised low fat/low cholesterol diet, low salt diet, and weight loss\par \par HLD: LDL at target. continue rosuvastatin \par low fat/low cholesterol diet and weight loss advised\par \par Obesity : \par discussed diet and exercise\par encouraged more exercise walking 30 min 3 x week\par Needs to try to have more protein for meals\par \par Morbid obesity: \par discussed diet and exercise\par encouraged more exercise walking 30 min 3 x week\par \par

## 2021-06-09 ENCOUNTER — APPOINTMENT (OUTPATIENT)
Dept: ENDOCRINOLOGY | Facility: CLINIC | Age: 51
End: 2021-06-09

## 2021-06-16 ENCOUNTER — APPOINTMENT (OUTPATIENT)
Dept: INTERNAL MEDICINE | Facility: CLINIC | Age: 51
End: 2021-06-16
Payer: COMMERCIAL

## 2021-06-16 VITALS
DIASTOLIC BLOOD PRESSURE: 80 MMHG | SYSTOLIC BLOOD PRESSURE: 146 MMHG | HEIGHT: 73 IN | OXYGEN SATURATION: 98 % | HEART RATE: 99 BPM | TEMPERATURE: 97.7 F | BODY MASS INDEX: 41.75 KG/M2 | WEIGHT: 315 LBS

## 2021-06-16 PROCEDURE — 82962 GLUCOSE BLOOD TEST: CPT

## 2021-06-16 PROCEDURE — 83036 HEMOGLOBIN GLYCOSYLATED A1C: CPT | Mod: QW

## 2021-06-16 PROCEDURE — 99214 OFFICE O/P EST MOD 30 MIN: CPT | Mod: 25

## 2021-06-22 ENCOUNTER — RX RENEWAL (OUTPATIENT)
Age: 51
End: 2021-06-22

## 2021-06-22 LAB — HBA1C MFR BLD HPLC: 9.7

## 2021-06-23 ENCOUNTER — NON-APPOINTMENT (OUTPATIENT)
Age: 51
End: 2021-06-23

## 2021-06-25 RX ORDER — LANCETS 30 GAUGE
EACH MISCELLANEOUS
Qty: 6 | Refills: 1 | Status: DISCONTINUED | COMMUNITY
Start: 2021-06-04 | End: 2021-06-25

## 2021-06-25 RX ORDER — BLOOD SUGAR DIAGNOSTIC
STRIP MISCELLANEOUS
Qty: 600 | Refills: 1 | Status: DISCONTINUED | COMMUNITY
Start: 2021-06-04 | End: 2021-06-25

## 2021-08-04 ENCOUNTER — APPOINTMENT (OUTPATIENT)
Dept: ENDOCRINOLOGY | Facility: CLINIC | Age: 51
End: 2021-08-04
Payer: COMMERCIAL

## 2021-08-04 PROCEDURE — P0006: CPT

## 2021-08-16 ENCOUNTER — APPOINTMENT (OUTPATIENT)
Dept: ENDOCRINOLOGY | Facility: CLINIC | Age: 51
End: 2021-08-16

## 2021-08-18 ENCOUNTER — APPOINTMENT (OUTPATIENT)
Dept: ENDOCRINOLOGY | Facility: CLINIC | Age: 51
End: 2021-08-18
Payer: COMMERCIAL

## 2021-08-18 PROCEDURE — G0108 DIAB MANAGE TRN  PER INDIV: CPT

## 2021-08-25 ENCOUNTER — APPOINTMENT (OUTPATIENT)
Dept: ENDOCRINOLOGY | Facility: CLINIC | Age: 51
End: 2021-08-25
Payer: COMMERCIAL

## 2021-08-25 PROCEDURE — G0108 DIAB MANAGE TRN  PER INDIV: CPT

## 2021-08-30 ENCOUNTER — NON-APPOINTMENT (OUTPATIENT)
Age: 51
End: 2021-08-30

## 2021-09-08 ENCOUNTER — APPOINTMENT (OUTPATIENT)
Dept: ENDOCRINOLOGY | Facility: CLINIC | Age: 51
End: 2021-09-08
Payer: COMMERCIAL

## 2021-09-08 PROCEDURE — G0108 DIAB MANAGE TRN  PER INDIV: CPT

## 2021-10-11 ENCOUNTER — APPOINTMENT (OUTPATIENT)
Dept: INTERNAL MEDICINE | Facility: CLINIC | Age: 51
End: 2021-10-11
Payer: COMMERCIAL

## 2021-10-11 ENCOUNTER — RESULT CHARGE (OUTPATIENT)
Age: 51
End: 2021-10-11

## 2021-10-11 VITALS
TEMPERATURE: 98.2 F | OXYGEN SATURATION: 89 % | SYSTOLIC BLOOD PRESSURE: 140 MMHG | WEIGHT: 315 LBS | BODY MASS INDEX: 42.62 KG/M2 | HEART RATE: 99 BPM | DIASTOLIC BLOOD PRESSURE: 84 MMHG | RESPIRATION RATE: 16 BRPM

## 2021-10-11 PROCEDURE — 83036 HEMOGLOBIN GLYCOSYLATED A1C: CPT | Mod: QW

## 2021-10-11 PROCEDURE — 99214 OFFICE O/P EST MOD 30 MIN: CPT | Mod: 25

## 2021-10-12 LAB
ALBUMIN SERPL ELPH-MCNC: 4.3 G/DL
ALP BLD-CCNC: 72 U/L
ALT SERPL-CCNC: 31 U/L
ANION GAP SERPL CALC-SCNC: 19 MMOL/L
AST SERPL-CCNC: 26 U/L
BILIRUB SERPL-MCNC: 0.2 MG/DL
BUN SERPL-MCNC: 20 MG/DL
CALCIUM SERPL-MCNC: 9.4 MG/DL
CHLORIDE SERPL-SCNC: 104 MMOL/L
CHOLEST SERPL-MCNC: 163 MG/DL
CO2 SERPL-SCNC: 18 MMOL/L
CREAT SERPL-MCNC: 1.1 MG/DL
GLUCOSE SERPL-MCNC: 95 MG/DL
HDLC SERPL-MCNC: 39 MG/DL
LDLC SERPL CALC-MCNC: 75 MG/DL
LDLC SERPL DIRECT ASSAY-MCNC: 82 MG/DL
NONHDLC SERPL-MCNC: 124 MG/DL
POTASSIUM SERPL-SCNC: 4.3 MMOL/L
PROT SERPL-MCNC: 7.2 G/DL
SODIUM SERPL-SCNC: 140 MMOL/L
TRIGL SERPL-MCNC: 241 MG/DL

## 2021-10-13 ENCOUNTER — NON-APPOINTMENT (OUTPATIENT)
Age: 51
End: 2021-10-13

## 2021-10-14 ENCOUNTER — RESULT CHARGE (OUTPATIENT)
Age: 51
End: 2021-10-14

## 2021-10-14 ENCOUNTER — MED ADMIN CHARGE (OUTPATIENT)
Age: 51
End: 2021-10-14

## 2021-10-14 ENCOUNTER — APPOINTMENT (OUTPATIENT)
Dept: INTERNAL MEDICINE | Facility: CLINIC | Age: 51
End: 2021-10-14
Payer: COMMERCIAL

## 2021-10-14 DIAGNOSIS — Z23 ENCOUNTER FOR IMMUNIZATION: ICD-10-CM

## 2021-10-14 PROCEDURE — 0001A: CPT

## 2021-11-04 ENCOUNTER — NON-APPOINTMENT (OUTPATIENT)
Age: 51
End: 2021-11-04

## 2021-11-04 ENCOUNTER — APPOINTMENT (OUTPATIENT)
Dept: INTERNAL MEDICINE | Facility: CLINIC | Age: 51
End: 2021-11-04
Payer: COMMERCIAL

## 2021-11-04 PROCEDURE — 0002A: CPT

## 2021-12-09 ENCOUNTER — APPOINTMENT (OUTPATIENT)
Dept: ENDOCRINOLOGY | Facility: CLINIC | Age: 51
End: 2021-12-09

## 2021-12-20 ENCOUNTER — RX RENEWAL (OUTPATIENT)
Age: 51
End: 2021-12-20

## 2021-12-27 ENCOUNTER — RX RENEWAL (OUTPATIENT)
Age: 51
End: 2021-12-27

## 2022-01-05 ENCOUNTER — APPOINTMENT (OUTPATIENT)
Dept: FAMILY MEDICINE | Facility: CLINIC | Age: 52
End: 2022-01-05
Payer: COMMERCIAL

## 2022-01-05 VITALS
HEIGHT: 73 IN | BODY MASS INDEX: 41.08 KG/M2 | WEIGHT: 310 LBS | HEART RATE: 79 BPM | SYSTOLIC BLOOD PRESSURE: 138 MMHG | TEMPERATURE: 97.1 F | DIASTOLIC BLOOD PRESSURE: 86 MMHG

## 2022-01-05 DIAGNOSIS — F17.290 NICOTINE DEPENDENCE, OTHER TOBACCO PRODUCT, UNCOMPLICATED: ICD-10-CM

## 2022-01-05 DIAGNOSIS — Z23 ENCOUNTER FOR IMMUNIZATION: ICD-10-CM

## 2022-01-05 DIAGNOSIS — Z11.4 ENCOUNTER FOR SCREENING FOR HUMAN IMMUNODEFICIENCY VIRUS [HIV]: ICD-10-CM

## 2022-01-05 DIAGNOSIS — Z12.2 ENCOUNTER FOR SCREENING FOR MALIGNANT NEOPLASM OF RESPIRATORY ORGANS: ICD-10-CM

## 2022-01-05 DIAGNOSIS — Z13.29 ENCOUNTER FOR SCREENING FOR OTHER SUSPECTED ENDOCRINE DISORDER: ICD-10-CM

## 2022-01-05 DIAGNOSIS — Z11.59 ENCOUNTER FOR SCREENING FOR OTHER VIRAL DISEASES: ICD-10-CM

## 2022-01-05 PROCEDURE — 99396 PREV VISIT EST AGE 40-64: CPT | Mod: 25

## 2022-01-05 PROCEDURE — 36415 COLL VENOUS BLD VENIPUNCTURE: CPT

## 2022-01-05 PROCEDURE — 90732 PPSV23 VACC 2 YRS+ SUBQ/IM: CPT

## 2022-01-05 PROCEDURE — G0009: CPT

## 2022-01-05 PROCEDURE — 99214 OFFICE O/P EST MOD 30 MIN: CPT | Mod: 25

## 2022-01-05 RX ORDER — DULAGLUTIDE 1.5 MG/.5ML
1.5 INJECTION, SOLUTION SUBCUTANEOUS
Qty: 1 | Refills: 5 | Status: DISCONTINUED | COMMUNITY
Start: 2021-02-19 | End: 2022-01-05

## 2022-01-05 NOTE — PLAN
[FreeTextEntry1] : \par In regards annual physical exam: \par Pneumovax-23 vaccine given, tolerated well\par Ordering CBC, CMP, Hep C, HIV\par Colon cancer screen ordered: referring to colorectal surgery for colonoscopy\par AAA screen: Aortic abdominal ultrasound ordered\par PHQ-2 test done, < 2 as noted above\par AUDIT-C test done\par Advised to see optometrist once at year and dentist every 6 months \par \par Cigar smoker\par Lung cancer screen: lung CT scan ordered\par Counseled provided regarding the risk of smoking and benefits of quitting. \par \par In regards to hypertension\par Patient's blood pressure in adequate range. \par Continue Valsartan 320 mg QD\par Continue amlodipine 10 mg QD\par \par Diabetes Mellitus with HLD\par Patient's blood sugar reviewed. Continue home monitoring\par Sees endocrinology\par Checking a HBA1C. LAst on on 10/12/2021: 7.3%\par Checking a microalb/creat ratio\par Sees podiatry, will need records\par Sees ophthalmology, will need records\par Changing Trulicity to Ozempic per patient;s request\par Continue Novolog\par Continue metformin 500 mg BID\par Checking lipid panel\par Continue Rosuvastatin 40 mg QD\par \par Obesity \par Lifestyle modifications discussed\par Checking TSH\par Referring to weight medicine \par \par Return to care: within 3 months for follow up or earlier if needed\par Call or return for any questions

## 2022-01-05 NOTE — PHYSICAL EXAM
[No Acute Distress] : no acute distress [Well Nourished] : well nourished [Well Developed] : well developed [Well-Appearing] : well-appearing [Normal Voice/Communication] : normal voice/communication [Normal Sclera/Conjunctiva] : normal sclera/conjunctiva [PERRL] : pupils equal round and reactive to light [EOMI] : extraocular movements intact [Normal Outer Ear/Nose] : the outer ears and nose were normal in appearance [Normal Oropharynx] : the oropharynx was normal [Normal TMs] : both tympanic membranes were normal [Normal Nasal Mucosa] : the nasal mucosa was normal [No JVD] : no jugular venous distention [No Lymphadenopathy] : no lymphadenopathy [Supple] : supple [Thyroid Normal, No Nodules] : the thyroid was normal and there were no nodules present [No Respiratory Distress] : no respiratory distress  [No Accessory Muscle Use] : no accessory muscle use [Clear to Auscultation] : lungs were clear to auscultation bilaterally [Normal Rate] : normal rate  [Regular Rhythm] : with a regular rhythm [Normal S1, S2] : normal S1 and S2 [No Murmur] : no murmur heard [No Carotid Bruits] : no carotid bruits [No Abdominal Bruit] : a ~M bruit was not heard ~T in the abdomen [No Varicosities] : no varicosities [Pedal Pulses Present] : the pedal pulses are present [No Edema] : there was no peripheral edema [No Palpable Aorta] : no palpable aorta [No Extremity Clubbing/Cyanosis] : no extremity clubbing/cyanosis [Soft] : abdomen soft [Non Tender] : non-tender [Non-distended] : non-distended [No Masses] : no abdominal mass palpated [No HSM] : no HSM [Normal Bowel Sounds] : normal bowel sounds [Urethral Meatus] : meatus normal [Penis Abnormality] : normal circumcised penis [Scrotum] : the scrotum was normal [Epididymis] : the epididymides were normal [Testes Tenderness] : no tenderness of the testes [Testes Mass (___cm)] : there were no testicular masses [Normal Supraclavicular Nodes] : no supraclavicular lymphadenopathy [Normal Posterior Cervical Nodes] : no posterior cervical lymphadenopathy [Normal Anterior Cervical Nodes] : no anterior cervical lymphadenopathy [No CVA Tenderness] : no CVA  tenderness [No Spinal Tenderness] : no spinal tenderness [No Joint Swelling] : no joint swelling [Grossly Normal Strength/Tone] : grossly normal strength/tone [No Rash] : no rash [Coordination Grossly Intact] : coordination grossly intact [No Focal Deficits] : no focal deficits [Normal Gait] : normal gait [Deep Tendon Reflexes (DTR)] : deep tendon reflexes were 2+ and symmetric [Speech Grossly Normal] : speech grossly normal [Normal Affect] : the affect was normal [Normal Mood] : the mood was normal [Normal Insight/Judgement] : insight and judgment were intact

## 2022-01-05 NOTE — COUNSELING
[Potential consequences of obesity discussed] : Potential consequences of obesity discussed [Structured Weight Management Program suggested:] : Structured weight management program suggested [Encouraged to increase physical activity] : Encouraged to increase physical activity [Good understanding] : Patient has a good understanding of disease, goals and obesity follow-up plan [Cessation strategies including cessation program discussed] : Cessation strategies including cessation program discussed [Encouraged to pick a quit date and identify support needed to quit] : Encouraged to pick a quit date and identify support needed to quit

## 2022-01-05 NOTE — HISTORY OF PRESENT ILLNESS
[FreeTextEntry1] : establish care [de-identified] : Mr. JANIS LEON is a pleasant 51 year old male with PMH of HLD, DM, HTN  who comes in to the office to establish care, CPE and for medication refills. He changed his insurance and want to change back from Trulicity to Ozempic. No complaints

## 2022-01-05 NOTE — HEALTH RISK ASSESSMENT
[Current] : Current [20 or more] : 20 or more [Yes] : Yes [2 - 4 times a month (2 pts)] : 2-4 times a month (2 points) [3 or 4 (1 pt)] : 3 or 4  (1 point) [Never (0 pts)] : Never (0 points) [No] : In the past 12 months have you used drugs other than those required for medical reasons? No [0] : 2) Feeling down, depressed, or hopeless: Not at all (0) [PHQ-2 Negative - No further assessment needed] : PHQ-2 Negative - No further assessment needed [HIV Test offered] : HIV Test offered [Hepatitis C test offered] : Hepatitis C test offered [Employed] : employed [de-identified] : 2-3 cigars/night [Audit-CScore] : 3 [[id number]] : 0 [Reports changes in dental health] : Reports no changes in dental health [Smoke Detector] : no smoke detector [FreeTextEntry2] :

## 2022-01-07 LAB
ALBUMIN SERPL ELPH-MCNC: 4.6 G/DL
ALP BLD-CCNC: 65 U/L
ALT SERPL-CCNC: 35 U/L
ANION GAP SERPL CALC-SCNC: 15 MMOL/L
AST SERPL-CCNC: 24 U/L
BASOPHILS # BLD AUTO: 0.03 K/UL
BASOPHILS NFR BLD AUTO: 0.6 %
BILIRUB SERPL-MCNC: 0.2 MG/DL
BUN SERPL-MCNC: 19 MG/DL
CALCIUM SERPL-MCNC: 9.8 MG/DL
CHLORIDE SERPL-SCNC: 104 MMOL/L
CHOLEST SERPL-MCNC: 223 MG/DL
CO2 SERPL-SCNC: 23 MMOL/L
CREAT SERPL-MCNC: 1.22 MG/DL
CREAT SPEC-SCNC: 243 MG/DL
EOSINOPHIL # BLD AUTO: 0.06 K/UL
EOSINOPHIL NFR BLD AUTO: 1.2 %
ESTIMATED AVERAGE GLUCOSE: 180 MG/DL
GLUCOSE SERPL-MCNC: 75 MG/DL
HBA1C MFR BLD HPLC: 7.9 %
HCT VFR BLD CALC: 41.9 %
HCV AB SER QL: NONREACTIVE
HCV S/CO RATIO: 0.12 S/CO
HDLC SERPL-MCNC: 47 MG/DL
HGB BLD-MCNC: 13.8 G/DL
HIV1+2 AB SPEC QL IA.RAPID: NONREACTIVE
IMM GRANULOCYTES NFR BLD AUTO: 0.4 %
LDLC SERPL CALC-MCNC: 142 MG/DL
LYMPHOCYTES # BLD AUTO: 1.95 K/UL
LYMPHOCYTES NFR BLD AUTO: 38.5 %
MAN DIFF?: NORMAL
MCHC RBC-ENTMCNC: 29.1 PG
MCHC RBC-ENTMCNC: 32.9 GM/DL
MCV RBC AUTO: 88.2 FL
MICROALBUMIN 24H UR DL<=1MG/L-MCNC: 13.2 MG/DL
MICROALBUMIN/CREAT 24H UR-RTO: 54 MG/G
MONOCYTES # BLD AUTO: 0.38 K/UL
MONOCYTES NFR BLD AUTO: 7.5 %
NEUTROPHILS # BLD AUTO: 2.62 K/UL
NEUTROPHILS NFR BLD AUTO: 51.8 %
NONHDLC SERPL-MCNC: 176 MG/DL
PLATELET # BLD AUTO: 351 K/UL
POTASSIUM SERPL-SCNC: 5.1 MMOL/L
PROT SERPL-MCNC: 7.5 G/DL
RBC # BLD: 4.75 M/UL
RBC # FLD: 13.6 %
SODIUM SERPL-SCNC: 142 MMOL/L
TRIGL SERPL-MCNC: 171 MG/DL
TSH SERPL-ACNC: 1.2 UIU/ML
WBC # FLD AUTO: 5.06 K/UL

## 2022-01-12 ENCOUNTER — NON-APPOINTMENT (OUTPATIENT)
Age: 52
End: 2022-01-12

## 2022-01-17 ENCOUNTER — APPOINTMENT (OUTPATIENT)
Dept: ENDOCRINOLOGY | Facility: CLINIC | Age: 52
End: 2022-01-17
Payer: COMMERCIAL

## 2022-01-17 ENCOUNTER — APPOINTMENT (OUTPATIENT)
Dept: CT IMAGING | Facility: CLINIC | Age: 52
End: 2022-01-17

## 2022-01-17 VITALS
DIASTOLIC BLOOD PRESSURE: 96 MMHG | WEIGHT: 315 LBS | BODY MASS INDEX: 41.75 KG/M2 | HEIGHT: 73 IN | SYSTOLIC BLOOD PRESSURE: 144 MMHG

## 2022-01-17 PROCEDURE — 99215 OFFICE O/P EST HI 40 MIN: CPT | Mod: 25

## 2022-01-17 PROCEDURE — 95251 CONT GLUC MNTR ANALYSIS I&R: CPT

## 2022-01-17 RX ORDER — BLOOD-GLUCOSE METER
W/DEVICE KIT MISCELLANEOUS
Qty: 1 | Refills: 0 | Status: DISCONTINUED | COMMUNITY
Start: 2021-06-25 | End: 2022-01-17

## 2022-01-17 RX ORDER — CLINDAMYCIN PHOSPHATE 10 MG/ML
1 SOLUTION TOPICAL TWICE DAILY
Qty: 1 | Refills: 3 | Status: DISCONTINUED | COMMUNITY
Start: 2018-04-11 | End: 2022-01-17

## 2022-01-17 RX ORDER — INSULIN ASPART 100 [IU]/ML
100 INJECTION, SOLUTION INTRAVENOUS; SUBCUTANEOUS
Qty: 1 | Refills: 0 | Status: DISCONTINUED | COMMUNITY
Start: 2018-06-19 | End: 2022-01-17

## 2022-01-17 RX ORDER — SEMAGLUTIDE 1.34 MG/ML
4 INJECTION, SOLUTION SUBCUTANEOUS
Qty: 3 | Refills: 0 | Status: DISCONTINUED | COMMUNITY
Start: 2022-01-05 | End: 2022-01-17

## 2022-01-17 RX ORDER — METFORMIN HYDROCHLORIDE 500 MG/1
500 TABLET, COATED ORAL
Qty: 180 | Refills: 1 | Status: DISCONTINUED | COMMUNITY
Start: 2021-03-02 | End: 2022-01-17

## 2022-01-17 RX ORDER — BLOOD-GLUCOSE METER
KIT MISCELLANEOUS
Qty: 3 | Refills: 0 | Status: DISCONTINUED | COMMUNITY
Start: 2021-07-09 | End: 2022-01-17

## 2022-01-18 NOTE — ASSESSMENT
[Exercise/Effect on Glucose] : exercise/effect on glucose [Weight Loss] : weight loss [FreeTextEntry1] : DM2 mildly uncontrolled in patient with HTN,. HLD and obesity. He has medtronic pump and no CGM.  \par \par DM2 : A1c 7.9.  He gained 10 lbs. Poor insight \par cgm report revised and d/w pt : target Bg 67%, target  range 33% \par he has consistent hyperg after meals 220-350. he always has excuses and explanations for noncomplaince with diet. He is trying to cunt carbs per meal. Lower ICR by 0.5.  \par he does : detoxying; 2x week now /???  \par continue ozempic 1 mg weekly. \par increase MF to 1000 mg BID  \par start  mg BID \par continue current dose of basal insulins\par for meals patient seems to be vague .He states he enter numbers 12-25-50 for carbs ?? in pump \par discussed diet and exercise\par encouraged more exercise \par See CDE for diet teaching\par GFR normal. continue ARB \par continue MVI supplements\par diabetic  neuropathy: monitor clinically. \par eye exam referral  \par \par HTN ;  bp at target on meds. Continue current management.\par I advised low fat/low cholesterol diet, low salt diet, and weight loss\par \par HLD: LDL at target. continue rosuvastatin and advsied not to stop it \par low fat/low cholesterol diet and weight loss advised\par \par Obesity : gained 10 lbs due to overeating. he went back to work and walks a lot daily. \par discussed diet and exercise\par encouraged more exercise walking 30 min 3 x week\par \par Morbid obesity: \par discussed diet and exercise\par encouraged more exercise walking 30 min 3 x week\par \par

## 2022-01-18 NOTE — ADDENDUM
[FreeTextEntry1] : This patient with diabetes is using a continuous glucose monitor. The patient is treated with insulin via  a subcutaneous insulin infusion pump. This patient requires frequent adjustments to their insulin treatment by the patient on the basis of therapeutic continuous glucose monitoring results via adjusting insulin pump settings. In addition, the patient has been to our office for an evaluation of their diabetes control within the past 6 months. \par \par

## 2022-01-18 NOTE — PHYSICAL EXAM
[Alert] : alert [Well Nourished] : well nourished [Obese] : obese [No Acute Distress] : no acute distress [Well Developed] : well developed [Normal Sclera/Conjunctiva] : normal sclera/conjunctiva [EOMI] : extra ocular movement intact [No Proptosis] : no proptosis [Normal Oropharynx] : the oropharynx was normal [Thyroid Not Enlarged] : the thyroid was not enlarged [No Thyroid Nodules] : no palpable thyroid nodules [No Respiratory Distress] : no respiratory distress [No Accessory Muscle Use] : no accessory muscle use [Clear to Auscultation] : lungs were clear to auscultation bilaterally [Normal S1, S2] : normal S1 and S2 [Normal Rate] : heart rate was normal [Regular Rhythm] : with a regular rhythm [No Edema] : no peripheral edema [Pedal Pulses Normal] : the pedal pulses are present [Normal Bowel Sounds] : normal bowel sounds [Not Tender] : non-tender [Not Distended] : not distended [Soft] : abdomen soft [Normal Anterior Cervical Nodes] : no anterior cervical lymphadenopathy [Spine Straight] : spine straight [Normal Gait] : normal gait [Acanthosis Nigricans] : acanthosis nigricans present [No Tremors] : no tremors [Oriented x3] : oriented to person, place, and time [de-identified] : acanthosis eyes with edema B/L lids ( allergies)  [de-identified] : R foot stasis dermatitis

## 2022-01-26 ENCOUNTER — APPOINTMENT (OUTPATIENT)
Dept: ENDOCRINOLOGY | Facility: CLINIC | Age: 52
End: 2022-01-26

## 2022-02-08 ENCOUNTER — RX RENEWAL (OUTPATIENT)
Age: 52
End: 2022-02-08

## 2022-02-10 ENCOUNTER — RX RENEWAL (OUTPATIENT)
Age: 52
End: 2022-02-10

## 2022-02-10 RX ORDER — LANCETS 30 GAUGE
EACH MISCELLANEOUS
Qty: 600 | Refills: 1 | Status: ACTIVE | COMMUNITY
Start: 2022-02-10 | End: 1900-01-01

## 2022-02-24 ENCOUNTER — APPOINTMENT (OUTPATIENT)
Dept: ENDOCRINOLOGY | Facility: CLINIC | Age: 52
End: 2022-02-24

## 2022-02-25 ENCOUNTER — APPOINTMENT (OUTPATIENT)
Dept: DERMATOLOGY | Facility: CLINIC | Age: 52
End: 2022-02-25

## 2022-04-04 ENCOUNTER — RX RENEWAL (OUTPATIENT)
Age: 52
End: 2022-04-04

## 2022-04-11 PROBLEM — Z11.59 SCREENING FOR VIRAL DISEASE: Status: ACTIVE | Noted: 2020-08-12

## 2022-04-20 ENCOUNTER — APPOINTMENT (OUTPATIENT)
Dept: FAMILY MEDICINE | Facility: CLINIC | Age: 52
End: 2022-04-20
Payer: COMMERCIAL

## 2022-04-20 VITALS
HEART RATE: 87 BPM | BODY MASS INDEX: 41.75 KG/M2 | HEIGHT: 73 IN | SYSTOLIC BLOOD PRESSURE: 138 MMHG | DIASTOLIC BLOOD PRESSURE: 90 MMHG | WEIGHT: 315 LBS | RESPIRATION RATE: 16 BRPM

## 2022-04-20 DIAGNOSIS — E66.01 MORBID (SEVERE) OBESITY DUE TO EXCESS CALORIES: ICD-10-CM

## 2022-04-20 PROCEDURE — 99215 OFFICE O/P EST HI 40 MIN: CPT | Mod: 25

## 2022-04-20 PROCEDURE — 36415 COLL VENOUS BLD VENIPUNCTURE: CPT

## 2022-04-20 RX ORDER — FLUTICASONE FUROATE AND VILANTEROL TRIFENATATE 100; 25 UG/1; UG/1
100-25 POWDER RESPIRATORY (INHALATION)
Qty: 1 | Refills: 0 | Status: DISCONTINUED | COMMUNITY
Start: 2020-11-13 | End: 2022-04-20

## 2022-04-20 RX ORDER — TADALAFIL 20 MG/1
20 TABLET ORAL
Qty: 6 | Refills: 9 | Status: ACTIVE | COMMUNITY
Start: 2019-01-02 | End: 1900-01-01

## 2022-04-20 RX ORDER — FUROSEMIDE 40 MG/1
40 TABLET ORAL DAILY
Qty: 30 | Refills: 2 | Status: DISCONTINUED | COMMUNITY
Start: 2020-12-04 | End: 2022-04-20

## 2022-04-20 NOTE — HISTORY OF PRESENT ILLNESS
[FreeTextEntry1] : follow up [de-identified] : Mr. JANIS LEON is a pleasant 51 year old male with PMH of HLD, DM, HTN, asthma, last crisis last spring who comes in to the office to follow up in medical conditions. Patient has a continuous glucose monitor and is treated with insulin via a subcutaneous insulin infusion pump. Saw endocrinology Dr Cross on feb/2022 who increased his metformin to 1000 mg BID. Has been off Ozempic for 3 weeks\par Has had some intermittent leg swelling, mainly after being standing for long time. \par No other complaints

## 2022-04-20 NOTE — PLAN
[FreeTextEntry1] : \par In regards to hypertension\par Patient's blood pressure in adequate range. \par Continue Valsartan 320 mg QD\par Stop amlodipine 10 mg QD due to leg swelling\par Start metoprolol ER 50 mg QD, explained that may need 100 mg if SBP > 140 or DBP > 90 as long as  HR > 60\par Reassess within 1 week\par \par Diabetes Mellitus with HLD\par Patient's blood sugar reviewed. Continue home monitoring\par Sees endocrinology\par Checking a HBA1C. Last on on 01/2022: 7.9%\par + microalb/creat ratio, already on Valsartan\par Sees podiatry, will need records\par Sees ophthalmology, will need records\par Continue Ozempic\par Continue Novolog via insulin pump, patient will ask endocrinology for a refill\par Continue metformin 1000 mg BID, increased 2 months ago\par Checking lipid panel\par Continue Rosuvastatin 40 mg QD\par \par Obesity \par Lifestyle modifications discussed\par Checking TSH\par Referring to weight medicine \par \par Leg swelling\par Is positional per patient\par Stopping amlodipine\par Leg elevation advised\par Compression stockings advised\par \par Ordering colon cancer screen: iFOBT\par \par Asthma\par Controlled\par Renewing his albuterol inhaler\par \par Erectile dysfunction\par uses Cialis as needed\par Return to care: within 1 week for follow up or earlier if needed\par Call or return for any questions

## 2022-04-20 NOTE — PHYSICAL EXAM
[Normal] : normal rate, regular rhythm, normal S1 and S2 and no murmur heard [de-identified] : 1+ b/l pitting edema

## 2022-04-21 LAB
ANION GAP SERPL CALC-SCNC: 11 MMOL/L
BUN SERPL-MCNC: 14 MG/DL
CALCIUM SERPL-MCNC: 9.6 MG/DL
CHLORIDE SERPL-SCNC: 104 MMOL/L
CHOLEST SERPL-MCNC: 190 MG/DL
CO2 SERPL-SCNC: 24 MMOL/L
CREAT SERPL-MCNC: 0.88 MG/DL
EGFR: 104 ML/MIN/1.73M2
ESTIMATED AVERAGE GLUCOSE: 192 MG/DL
GLUCOSE SERPL-MCNC: 97 MG/DL
HBA1C MFR BLD HPLC: 8.3 %
HDLC SERPL-MCNC: 63 MG/DL
LDLC SERPL CALC-MCNC: 112 MG/DL
NONHDLC SERPL-MCNC: 127 MG/DL
POTASSIUM SERPL-SCNC: 4.2 MMOL/L
SODIUM SERPL-SCNC: 140 MMOL/L
TRIGL SERPL-MCNC: 73 MG/DL

## 2022-04-22 ENCOUNTER — NON-APPOINTMENT (OUTPATIENT)
Age: 52
End: 2022-04-22

## 2022-04-26 ENCOUNTER — APPOINTMENT (OUTPATIENT)
Dept: DERMATOLOGY | Facility: CLINIC | Age: 52
End: 2022-04-26

## 2022-04-26 RX ORDER — SEMAGLUTIDE 1.34 MG/ML
2 INJECTION, SOLUTION SUBCUTANEOUS WEEKLY
Qty: 4 | Refills: 2 | Status: DISCONTINUED | COMMUNITY
Start: 2020-02-25 | End: 2022-04-26

## 2022-04-27 ENCOUNTER — APPOINTMENT (OUTPATIENT)
Dept: FAMILY MEDICINE | Facility: CLINIC | Age: 52
End: 2022-04-27

## 2022-04-29 ENCOUNTER — NON-APPOINTMENT (OUTPATIENT)
Age: 52
End: 2022-04-29

## 2022-05-18 ENCOUNTER — APPOINTMENT (OUTPATIENT)
Dept: FAMILY MEDICINE | Facility: CLINIC | Age: 52
End: 2022-05-18
Payer: COMMERCIAL

## 2022-05-18 VITALS
SYSTOLIC BLOOD PRESSURE: 143 MMHG | WEIGHT: 315 LBS | DIASTOLIC BLOOD PRESSURE: 83 MMHG | RESPIRATION RATE: 16 BRPM | HEART RATE: 81 BPM | BODY MASS INDEX: 41.75 KG/M2 | HEIGHT: 73 IN

## 2022-05-18 DIAGNOSIS — S91.301A UNSPECIFIED OPEN WOUND, RIGHT FOOT, INITIAL ENCOUNTER: ICD-10-CM

## 2022-05-18 PROCEDURE — 99214 OFFICE O/P EST MOD 30 MIN: CPT

## 2022-05-18 NOTE — PHYSICAL EXAM
[Normal] : affect was normal and insight and judgment were intact [de-identified] : External aspect of right sole with ulcer, no surrounding erythema, no drainage, no foul smelling.

## 2022-05-18 NOTE — HISTORY OF PRESENT ILLNESS
[FreeTextEntry1] : follow up [de-identified] : Mr. JANIS LEON is a pleasant 51 year old male with PMH of HLD, DM, HTN, asthma, last crisis last spring who comes in to the office to follow up in medical conditions. Patient has a continuous glucose monitor and is treated with insulin via a subcutaneous insulin infusion pump. Saw endocrinology Dr Cross on jan/2022. Has been taking metformin 2000 mg QD instead of 1000 mg BID. Patient is taking his Ozempic again.  \par Patient had leg swelling for over 1 month, I stopped his amlodipine and advised leg elevation, compression stockings. He developed a right foot blister after he saw me last time and has been following up with podiatry and vascular surgery who had him on an unna boot and wrapped. The Unna boot was removed today by vascular surgery. He was told that is improving and is not infected. Patient denies fever, pus, bleeding or pain but he has neuropathy.\par WIll see Dr Guzmán next week and see vascular surgery next month.\par \par Podiatry: Ramirez\par Vascular surgery: ?\par Previous PCP: Sade\par \par

## 2022-05-18 NOTE — PLAN
[FreeTextEntry1] : \par HTN\par Starting HCTZ due to BP not yet in target and due to his leg swelling\par Continue Valsartan 320 mg QD\par Continue metoprolol 50 mg QD\par Off amlodipine for 1 month now due to leg swelling\par Advised that HCTZ could increase his glucoses and he will monitor it and call the office and endocrinology for adjusting insulin.  \par \par Wound in light foot\par Saw vascular surgery today\par Off loading advised\par Will see podiatry Dr Guzmán on 05/25/2022, I called his office but is closed already. \par The wound doesn't seem infected. Since he is off the unna boot now wIll start Augmentin for 1 week until he sees podiatry\par \par DM with HLD\par Advised to take his metformin 1000 mg BID as prescribed by endocrinology\par Patient's blood sugar reviewed via his continuous monitor. Continue home monitoring\par Sees endocrinology\par Last HBA1C  on on 04/2022: 8.4%, repeat on july/2022\par + microalb/creat ratio, already on Valsartan\par Sees podiatry, will need records\par Sees ophthalmology, will need records\par Continue Ozempic\par Continue Novolog via insulin pump\par Continue metformin 1000 mg BID\par Checking lipid panel from 04/2022 not in target but improving, repeat on july/2022\par Continue Rosuvastatin 40 mg QD\par \par Return to care: within 1 week for follow up or earlier if needed\par Call or return for any questions

## 2022-05-27 ENCOUNTER — APPOINTMENT (OUTPATIENT)
Dept: FAMILY MEDICINE | Facility: CLINIC | Age: 52
End: 2022-05-27

## 2022-06-14 ENCOUNTER — RX CHANGE (OUTPATIENT)
Age: 52
End: 2022-06-14

## 2022-06-14 RX ORDER — HYDROCHLOROTHIAZIDE 12.5 MG/1
12.5 CAPSULE ORAL DAILY
Qty: 30 | Refills: 2 | Status: DISCONTINUED | COMMUNITY
Start: 2022-05-18 | End: 2022-06-14

## 2022-06-20 ENCOUNTER — APPOINTMENT (OUTPATIENT)
Dept: FAMILY MEDICINE | Facility: CLINIC | Age: 52
End: 2022-06-20
Payer: COMMERCIAL

## 2022-06-20 VITALS
HEART RATE: 85 BPM | DIASTOLIC BLOOD PRESSURE: 92 MMHG | HEIGHT: 73 IN | RESPIRATION RATE: 16 BRPM | BODY MASS INDEX: 40.69 KG/M2 | WEIGHT: 307 LBS | SYSTOLIC BLOOD PRESSURE: 158 MMHG

## 2022-06-20 PROCEDURE — 99214 OFFICE O/P EST MOD 30 MIN: CPT | Mod: 25

## 2022-06-20 RX ORDER — AMOXICILLIN AND CLAVULANATE POTASSIUM 875; 125 MG/1; MG/1
875-125 TABLET, COATED ORAL
Qty: 14 | Refills: 0 | Status: DISCONTINUED | COMMUNITY
Start: 2022-05-18 | End: 2022-06-20

## 2022-06-20 NOTE — HISTORY OF PRESENT ILLNESS
[FreeTextEntry1] : follow up [de-identified] : Mr. JANIS LEON is a pleasant 51 year old male with PMH of HLD, DM, HTN, asthma, last crisis last spring who comes in to the office to follow up in medical conditions. Patient has a continuous glucose monitor and is treated with insulin via a subcutaneous insulin infusion pump. Saw endocrinology Dr Cross on jan/2022. Is now taking metformin and Ozempic per endocrinology\par Patient had a right foot blister > 1 month ago and has been following up with podiatry and vascular surgery, is s/p unna boot,.he was told by those specialist that is improving and is not infected. Patient denies fever, pus, bleeding or pain but he has neuropathy.\par \par WIll see Dr Guzmán next week and see vascular surgery next month.\par \par Podiatry: Ramirez\par Vascular surgery: ?\par Previous PCP: Sade\par \par

## 2022-06-20 NOTE — PLAN
[FreeTextEntry1] : \par HTN\par Now in target\par Continue HCTZ 12.5 mg QD\par Continue Valsartan 320 mg QD\par Continue metoprolol 50 mg QD\par Off amlodipine due to leg swelling\par Was previously advised that HCTZ could increase his glucoses and he will monitor it and call the office and endocrinology for adjusting insulin. \par \par Wound in light foot\par Sees vasc surgery and podiatry\par Off loading advised\par Saw Vascular podiatry Dr Guzmán on 06/17/2022\par Will see podiatry Dr Henderson on 06/22/2022\par DM with HLD\par Continue metformin 1000 mg BID as prescribed by endocrinology\par Patient's blood sugar reviewed via his continuous monitor. Continue home monitoring\par Missed appointment with endocrino, advised to reschedule\par Sees endocrinology\par Last HBA1C on on 04/2022: 8.4%, repeat today\par + microalb/creat ratio, already on Valsartan\par Sees podiatry, will need records\par Sees ophthalmology, will need records\par Continue Ozempic 1 mg Q/week\par Continue Novolog via insulin pump\par Continue metformin 1000 mg BID\par Checking lipid panel from 04/2022 not in target but improving, repeat today \par Continue Rosuvastatin 40 mg QD\par \par Return to care: within 3 months for follow up or earlier if needed\par Call or return for any questions.

## 2022-06-20 NOTE — PHYSICAL EXAM
[Normal] : affect was normal and insight and judgment were intact [de-identified] : External aspect of right sole with < 2 cm ulcer, healing, no surrounding erythema, no drainage, no foul smelling.

## 2022-06-21 LAB
CHOLEST SERPL-MCNC: 191 MG/DL
ESTIMATED AVERAGE GLUCOSE: 197 MG/DL
HBA1C MFR BLD HPLC: 8.5 %
HDLC SERPL-MCNC: 47 MG/DL
LDLC SERPL CALC-MCNC: 114 MG/DL
NONHDLC SERPL-MCNC: 145 MG/DL
TRIGL SERPL-MCNC: 154 MG/DL

## 2022-06-28 ENCOUNTER — RX RENEWAL (OUTPATIENT)
Age: 52
End: 2022-06-28

## 2022-07-18 ENCOUNTER — RX RENEWAL (OUTPATIENT)
Age: 52
End: 2022-07-18

## 2022-08-30 ENCOUNTER — APPOINTMENT (OUTPATIENT)
Dept: GASTROENTEROLOGY | Facility: CLINIC | Age: 52
End: 2022-08-30

## 2022-09-15 ENCOUNTER — RX RENEWAL (OUTPATIENT)
Age: 52
End: 2022-09-15

## 2022-09-20 ENCOUNTER — APPOINTMENT (OUTPATIENT)
Dept: FAMILY MEDICINE | Facility: CLINIC | Age: 52
End: 2022-09-20

## 2022-10-04 ENCOUNTER — APPOINTMENT (OUTPATIENT)
Dept: FAMILY MEDICINE | Facility: CLINIC | Age: 52
End: 2022-10-04

## 2022-10-05 DIAGNOSIS — I87.2 VENOUS INSUFFICIENCY (CHRONIC) (PERIPHERAL): ICD-10-CM

## 2022-10-05 DIAGNOSIS — L97.919 NON-PRESSURE CHRONIC ULCER OF UNSPECIFIED PART OF RIGHT LOWER LEG WITH UNSPECIFIED SEVERITY: ICD-10-CM

## 2022-10-05 RX ORDER — FLUTICASONE PROPIONATE AND SALMETEROL 50; 250 UG/1; UG/1
250-50 POWDER RESPIRATORY (INHALATION)
Refills: 0 | Status: ACTIVE | COMMUNITY

## 2022-10-19 ENCOUNTER — APPOINTMENT (OUTPATIENT)
Dept: ENDOCRINOLOGY | Facility: CLINIC | Age: 52
End: 2022-10-19

## 2022-10-27 RX ORDER — ALCOHOL ANTISEPTIC PADS
PADS, MEDICATED (EA) TOPICAL
Qty: 8 | Refills: 0 | Status: ACTIVE | COMMUNITY
Start: 2022-09-07 | End: 1900-01-01

## 2022-11-10 ENCOUNTER — APPOINTMENT (OUTPATIENT)
Dept: ENDOCRINOLOGY | Facility: CLINIC | Age: 52
End: 2022-11-10

## 2022-11-14 ENCOUNTER — APPOINTMENT (OUTPATIENT)
Dept: FAMILY MEDICINE | Facility: CLINIC | Age: 52
End: 2022-11-14

## 2022-11-14 VITALS
DIASTOLIC BLOOD PRESSURE: 75 MMHG | SYSTOLIC BLOOD PRESSURE: 136 MMHG | WEIGHT: 290 LBS | HEIGHT: 73 IN | BODY MASS INDEX: 38.43 KG/M2 | HEART RATE: 82 BPM

## 2022-11-14 PROCEDURE — 99214 OFFICE O/P EST MOD 30 MIN: CPT | Mod: 25

## 2022-11-14 PROCEDURE — 36415 COLL VENOUS BLD VENIPUNCTURE: CPT

## 2022-11-14 RX ORDER — SILVER SULFADIAZINE 10 MG/G
1 CREAM TOPICAL TWICE DAILY
Qty: 1 | Refills: 0 | Status: ACTIVE | COMMUNITY
Start: 2022-11-14 | End: 1900-01-01

## 2022-11-14 NOTE — PLAN
[FreeTextEntry1] : \par HTN\par In target\par Continue HCTZ 12.5 mg QD\par Continue Valsartan 320 mg QD\par Continue metoprolol 50 mg QD\par Off amlodipine due to leg swelling\par Was previously advised that HCTZ could increase his glucoses and he will monitor it and call the office and endocrinology for adjusting insulin. \par \par Wound in abdomen\par Improving per patient\par No active drainage\par Start Keflex\par Apply bacitracin, cover with gauze for 2 days.\par Apply silvadene\par Alarm symptoms discussed  including but not limited to fever, worsening pain, pus and I advised the patient to call 911 or to go to the emergency department if these symptoms appear. \par Hygiene measures dsicussed\par \par DM with HLD\par Continue metformin 1000 mg BID as prescribed by endocrinology\par Patient's blood sugar reviewed via his continuous monitor. Continue home monitoring\par Missed appointment with endocrino, advised to reschedule\par Sees endocrinology\par Last HBA1C on on 04/2022: 8.4%, repeat today\par + microalb/creat ratio, already on Valsartan\par Sees podiatry, will need records\par Sees ophthalmology, will need records\par Continue Ozempic 2 mg Q/week\par Continue Novolog via insulin pump\par Continue metformin 1000 mg BID\par Checking lipid panel from 04/2022 not in target but improving, repeat today \par Continue Rosuvastatin 40 mg QD\par \par Return to care: within 3 months for follow up or earlier if needed\par Call or return for any questions.

## 2022-11-14 NOTE — HISTORY OF PRESENT ILLNESS
[FreeTextEntry1] : follow up [de-identified] : Mr. JANIS LEON is a pleasant 51 year old male with PMH of HLD, DM, HTN, asthma, last crisis last spring who comes in to the office to follow up in medical conditions. Patient has a continuous glucose monitor and is treated with insulin via a subcutaneous insulin infusion pump. He will see endocrinology Dr Cross on 11/22/2022. Is now taking metformin and Ozempic per endocrinology\par Patient has a small wound in his belly where he puts his glucose monitor, it was more inflamed and had pus and is now improving but is not fully dried. Patient denies fever, bleeding or pain but he has neuropathy.\par REports that the lesion of his foot is healed now. \par \par WIll see Dr Guzmán next week and see vascular surgery next month.\par \par Podiatry: Ramirez\par Vascular surgery: ?\par Previous PCP: Sade\par \par

## 2022-11-14 NOTE — PHYSICAL EXAM
[Normal] : normal rate, regular rhythm, normal S1 and S2 and no murmur heard [de-identified] : 1 cm superficial ulcer, with no active bleeding.

## 2022-11-15 LAB
CHOLEST SERPL-MCNC: 106 MG/DL
ESTIMATED AVERAGE GLUCOSE: 183 MG/DL
HBA1C MFR BLD HPLC: 8 %
HDLC SERPL-MCNC: 55 MG/DL
LDLC SERPL CALC-MCNC: 40 MG/DL
NONHDLC SERPL-MCNC: 51 MG/DL
TRIGL SERPL-MCNC: 54 MG/DL

## 2022-11-18 DIAGNOSIS — R60.0 LOCALIZED EDEMA: ICD-10-CM

## 2023-01-10 ENCOUNTER — NON-APPOINTMENT (OUTPATIENT)
Age: 53
End: 2023-01-10

## 2023-01-10 ENCOUNTER — APPOINTMENT (OUTPATIENT)
Dept: ENDOCRINOLOGY | Facility: CLINIC | Age: 53
End: 2023-01-10
Payer: COMMERCIAL

## 2023-01-10 VITALS
SYSTOLIC BLOOD PRESSURE: 130 MMHG | DIASTOLIC BLOOD PRESSURE: 80 MMHG | WEIGHT: 301 LBS | HEIGHT: 73 IN | OXYGEN SATURATION: 97 % | BODY MASS INDEX: 39.89 KG/M2 | HEART RATE: 83 BPM

## 2023-01-10 PROCEDURE — 99215 OFFICE O/P EST HI 40 MIN: CPT

## 2023-02-07 ENCOUNTER — RX RENEWAL (OUTPATIENT)
Age: 53
End: 2023-02-07

## 2023-02-13 ENCOUNTER — APPOINTMENT (OUTPATIENT)
Dept: FAMILY MEDICINE | Facility: CLINIC | Age: 53
End: 2023-02-13
Payer: COMMERCIAL

## 2023-02-13 VITALS
HEART RATE: 78 BPM | WEIGHT: 301 LBS | HEIGHT: 73 IN | BODY MASS INDEX: 39.89 KG/M2 | RESPIRATION RATE: 16 BRPM | SYSTOLIC BLOOD PRESSURE: 135 MMHG | DIASTOLIC BLOOD PRESSURE: 81 MMHG

## 2023-02-13 PROCEDURE — 99214 OFFICE O/P EST MOD 30 MIN: CPT | Mod: 25

## 2023-02-13 PROCEDURE — 36415 COLL VENOUS BLD VENIPUNCTURE: CPT

## 2023-02-13 NOTE — HEALTH RISK ASSESSMENT
[0] : 2) Feeling down, depressed, or hopeless: Not at all (0) [PHQ-2 Negative - No further assessment needed] : PHQ-2 Negative - No further assessment needed [[id number]] : 0

## 2023-02-13 NOTE — HISTORY OF PRESENT ILLNESS
[FreeTextEntry1] : follow up [de-identified] : Mr. JANIS LEON is a pleasant 51 year old male with PMH of HLD, DM, HTN, asthma, last crisis last spring who comes in to the office to follow up in medical conditions. Patient has a continuous glucose monitor and is treated with insulin via a subcutaneous insulin infusion pump. He sees endocrinology Dr Crsos who recently decreased his metformin and increased his Ozempic.\par \par Reports that the lesion of his foot is healed now. Sees a podiatrist\par \par WIll see Dr Guzmán next week and see vascular surgery next month.\par \par Podiatry: Ramirez\par Vascular surgery: ?\par Previous PCP: Sade\par \par

## 2023-02-13 NOTE — PLAN
[FreeTextEntry1] : \par HTN\par In target\par Continue HCTZ 12.5 mg QD\par Continue Valsartan 320 mg QD\par Continue metoprolol 50 mg QD\par Off amlodipine due to leg swelling\par Was previously advised that HCTZ could increase his glucoses and he will monitor it and call the office and endocrinology for adjusting insulin. \par \par DM with HLD\par Continue metformin 1000 mg BID as prescribed by endocrinology\par Patient's blood sugar reviewed via his continuous monitor. Continue home monitoring\par Missed appointment with endocrino, advised to reschedule\par Sees endocrinology\par Last HBA1C on on 11/2022: 8.0%, repeat today\par + microalb/creat ratio, already on Valsartan\par Sees podiatry, will need records\par Sees ophthalmology, will need records\par Continue Ozempic 2 mg Q/week\par Continue Novolog via insulin pump\par Continue metformin 500 mg BID\par Checking lipids\par Continue Rosuvastatin 40 mg QD\par \par Return to care: within 3 months for follow up or earlier if needed\par Call or return for any questions.

## 2023-02-14 LAB
CHOLEST SERPL-MCNC: 139 MG/DL
ESTIMATED AVERAGE GLUCOSE: 197 MG/DL
HBA1C MFR BLD HPLC: 8.5 %
HDLC SERPL-MCNC: 55 MG/DL
LDLC SERPL CALC-MCNC: 54 MG/DL
NONHDLC SERPL-MCNC: 83 MG/DL
TRIGL SERPL-MCNC: 147 MG/DL

## 2023-02-17 ENCOUNTER — NON-APPOINTMENT (OUTPATIENT)
Age: 53
End: 2023-02-17

## 2023-03-07 ENCOUNTER — RX RENEWAL (OUTPATIENT)
Age: 53
End: 2023-03-07

## 2023-03-08 ENCOUNTER — APPOINTMENT (OUTPATIENT)
Dept: VASCULAR SURGERY | Facility: CLINIC | Age: 53
End: 2023-03-08
Payer: COMMERCIAL

## 2023-03-08 VITALS
BODY MASS INDEX: 40.42 KG/M2 | HEART RATE: 76 BPM | HEIGHT: 73 IN | DIASTOLIC BLOOD PRESSURE: 83 MMHG | WEIGHT: 305 LBS | SYSTOLIC BLOOD PRESSURE: 152 MMHG

## 2023-03-08 DIAGNOSIS — T14.8XXA OTHER INJURY OF UNSPECIFIED BODY REGION, INITIAL ENCOUNTER: ICD-10-CM

## 2023-03-08 PROCEDURE — 99212 OFFICE O/P EST SF 10 MIN: CPT

## 2023-03-08 NOTE — PHYSICAL EXAM
[FreeTextEntry1] : 2-2+ distal anterior tibial pulses/dorsalis pedis pulses bilaterally; both feet pink, warm, and well-perfused; stasis dermatitis of the right distal medial calf without lymphangitis or cellulitis

## 2023-03-08 NOTE — ASSESSMENT
[FreeTextEntry1] : 52-year-old with lower extremity venous valvular incompetence based on previous venous duplex study but no evidence of recurrent ulceration with consistent use of compression stockings.  I noted that consistent compression was necessary and we will schedule him for a repeat venous duplex study to assess his current venous status.\par \par All questions were answered.

## 2023-03-08 NOTE — HISTORY OF PRESENT ILLNESS
[FreeTextEntry1] : Patient presents for routine reevaluation.  He has been utilizing his compression stockings every other day.  He denies formation of another ulcer.

## 2023-03-09 RX ORDER — INSULIN ASPART 100 [IU]/ML
100 INJECTION, SOLUTION INTRAVENOUS; SUBCUTANEOUS
Qty: 140 | Refills: 1 | Status: DISCONTINUED | COMMUNITY
Start: 2022-06-28 | End: 2023-03-09

## 2023-03-09 RX ORDER — INSULIN ASPART 100 [IU]/ML
100 INJECTION, SOLUTION INTRAVENOUS; SUBCUTANEOUS
Qty: 14 | Refills: 1 | Status: DISCONTINUED | COMMUNITY
Start: 2021-08-25 | End: 2023-03-09

## 2023-03-24 ENCOUNTER — RX CHANGE (OUTPATIENT)
Age: 53
End: 2023-03-24

## 2023-05-08 ENCOUNTER — RX RENEWAL (OUTPATIENT)
Age: 53
End: 2023-05-08

## 2023-05-15 ENCOUNTER — APPOINTMENT (OUTPATIENT)
Dept: FAMILY MEDICINE | Facility: CLINIC | Age: 53
End: 2023-05-15

## 2023-05-15 ENCOUNTER — RX RENEWAL (OUTPATIENT)
Age: 53
End: 2023-05-15

## 2023-05-18 ENCOUNTER — APPOINTMENT (OUTPATIENT)
Dept: INTERNAL MEDICINE | Facility: CLINIC | Age: 53
End: 2023-05-18

## 2023-05-24 ENCOUNTER — APPOINTMENT (OUTPATIENT)
Dept: VASCULAR SURGERY | Facility: CLINIC | Age: 53
End: 2023-05-24
Payer: COMMERCIAL

## 2023-05-24 VITALS
HEIGHT: 73 IN | BODY MASS INDEX: 40.16 KG/M2 | WEIGHT: 303 LBS | DIASTOLIC BLOOD PRESSURE: 88 MMHG | SYSTOLIC BLOOD PRESSURE: 151 MMHG | HEART RATE: 83 BPM

## 2023-05-24 DIAGNOSIS — R60.9 EDEMA, UNSPECIFIED: ICD-10-CM

## 2023-05-24 PROCEDURE — 99212 OFFICE O/P EST SF 10 MIN: CPT

## 2023-05-24 PROCEDURE — 93970 EXTREMITY STUDY: CPT

## 2023-05-24 NOTE — ASSESSMENT
[FreeTextEntry1] : 83-year-old with evidence of lower extremity venous insufficiency but no evidence of valvular incompetence in his venous system.\par \par I noted this to the patient but also stated that he should continue to use compression stockings on a consistent basis.\par \par The patient will follow-up with me on an as necessary basis.\par \par All questions were answered.

## 2023-05-24 NOTE — HISTORY OF PRESENT ILLNESS
[FreeTextEntry1] : Patient returns for evaluation discussion of the results of a venous duplex study performed today in my office.  This study demonstrated no evidence of valvular incompetence of either the deep or superficial systems.  In addition, no evidence of thrombus was noted in either of the legs superficial or deep systems.

## 2023-05-26 NOTE — ASSESSMENT
[FreeTextEntry1] : DM2 mildly uncontrolled in patient with HTN,. HLD and obesity. He has medtronic pump and no CGM.  \par \par DM2 :  a1c last 8 lbs\par pump report reviewed. he has bgs hyperg w each meal 180-220.Pt  takes 10-12 u insulin per meal. \par H estimates with " fist size". \par take all boluses 10-15 min before  meals. \par increase ozempic to 2 mg weekly D  \par decrease  mg TID  \par continue current dose of basal insulins\par he has multiple skin infections at the site on the abdomen  . advsied to rotate every 2 days \par continue ARB and  MVI \par diabetic  neuropathy: monitor clinically. \par R ankle necrobiosis lipoidica, chronic lesions, non infected now\par This patient with diabetes performs 4 or more glucose checks per day utilizing a home blood glucose monitor. The patient is treated with insulin via 3 or more injections daily (or a subcutaneous insulin infusion pump.) This patient requires frequent adjustments to their insulin treatment on the basis of therapeutic continuous glucose monitoring results. In addition, the patient has been to our office for an evaluation of their diabetes control within the past 6 months. \par \par \par HTN ;  bp at target on meds. Continue current management.\par I advised low fat/low cholesterol diet, low salt diet\par \par HLD: LDL at target. continue rosuvastatin \par low fat/low cholesterol diet \par \par Obesity : gained 11 lbs. encouraged more exercise walking 30 min 3 x week\par \par Morbid obesity: \par discussed diet and exercise\par encouraged more exercise walking 30 min 3 x week\par \par

## 2023-05-26 NOTE — PHYSICAL EXAM
[Alert] : alert [Well Nourished] : well nourished [Obese] : obese [No Acute Distress] : no acute distress [Well Developed] : well developed [Normal Sclera/Conjunctiva] : normal sclera/conjunctiva [EOMI] : extra ocular movement intact [No Proptosis] : no proptosis [Normal Oropharynx] : the oropharynx was normal [Thyroid Not Enlarged] : the thyroid was not enlarged [No Thyroid Nodules] : no palpable thyroid nodules [No Respiratory Distress] : no respiratory distress [No Accessory Muscle Use] : no accessory muscle use [Clear to Auscultation] : lungs were clear to auscultation bilaterally [Normal S1, S2] : normal S1 and S2 [Normal Rate] : heart rate was normal [Regular Rhythm] : with a regular rhythm [No Edema] : no peripheral edema [Pedal Pulses Normal] : the pedal pulses are present [Normal Bowel Sounds] : normal bowel sounds [Not Tender] : non-tender [Not Distended] : not distended [Soft] : abdomen soft [Normal Anterior Cervical Nodes] : no anterior cervical lymphadenopathy [Normal Gait] : normal gait [Acanthosis Nigricans] : acanthosis nigricans present [No Tremors] : no tremors [Oriented x3] : oriented to person, place, and time [de-identified] : acanthosis eyes with edema B/L lids ( allergies)  [de-identified] : R foot stasis dermatitis

## 2023-06-02 ENCOUNTER — APPOINTMENT (OUTPATIENT)
Dept: FAMILY MEDICINE | Facility: CLINIC | Age: 53
End: 2023-06-02
Payer: COMMERCIAL

## 2023-06-02 VITALS
SYSTOLIC BLOOD PRESSURE: 136 MMHG | WEIGHT: 304 LBS | BODY MASS INDEX: 40.29 KG/M2 | HEART RATE: 91 BPM | RESPIRATION RATE: 16 BRPM | HEIGHT: 73 IN | DIASTOLIC BLOOD PRESSURE: 82 MMHG

## 2023-06-02 DIAGNOSIS — Z71.2 PERSON CONSULTING FOR EXPLANATION OF EXAMINATION OR TEST FINDINGS: ICD-10-CM

## 2023-06-02 LAB
CHOLEST SERPL-MCNC: 190 MG/DL
ESTIMATED AVERAGE GLUCOSE: 197 MG/DL
HBA1C MFR BLD HPLC: 8.5 %
HDLC SERPL-MCNC: 58 MG/DL
LDLC SERPL CALC-MCNC: 101 MG/DL
NONHDLC SERPL-MCNC: 133 MG/DL
TRIGL SERPL-MCNC: 160 MG/DL

## 2023-06-02 PROCEDURE — 99214 OFFICE O/P EST MOD 30 MIN: CPT

## 2023-06-02 RX ORDER — CEPHALEXIN 500 MG/1
500 CAPSULE ORAL 4 TIMES DAILY
Qty: 28 | Refills: 0 | Status: DISCONTINUED | COMMUNITY
Start: 2022-11-14 | End: 2023-06-02

## 2023-06-02 NOTE — PLAN
[FreeTextEntry1] : \par HTN\par In target\par Continue HCTZ 12.5 mg QD\par Continue Valsartan 320 mg QD\par Continue metoprolol 50 mg QD\par Off amlodipine due to leg swelling\par Was previously advised that HCTZ could increase his glucoses and he will monitor it and call the office and endocrinology for adjusting insulin. \par \par DM with HLD\par Continue home glucose monitoring\par Sees endocrinology\par Last HbA1C on on Jun/01/2023: 8,5%, repeat within 3 months\par + microalb/creat ratio, already on Valsartan\par Sees podiatry, will need records\par Sees ophthalmology, will need records\par Continue Ozempic 2 mg Q/week, med adherence advised\par Continue metformin 500 mg BID\par Continue Novolog via insulin pump\par Lipids not in target Restart Rosuvastatin 40 mg QD\par Med adherence advised\par \par WIll see Dr Cross endocrinology next month\par Return to care: within 3 months for follow up or earlier if needed\par Call or return for any questions.

## 2023-06-02 NOTE — HISTORY OF PRESENT ILLNESS
[FreeTextEntry1] : Follow up [de-identified] : Mr. JANIS LEON is a pleasant 51 year old male with PMH of HLD, DM, HTN, asthma, last crisis last spring who comes in to the office to follow up in medical conditions. Patient has a continuous glucose monitor and is treated with insulin via a subcutaneous insulin infusion pump. He sees endocrinology Dr Cross who decreased his metformin and increased his Ozempic. He ha been working some night shifts and has been skipping some Ozempic injections and his lunch time metformin as he skips some meals in the past 4 months. Has not been taking his rosuvastatin as he thought that did not need it last time when his chol was in target.\par \par Reports that the lesion of his foot is healed now. Sees a podiatrist\par WIll see Dr Guzmán next week and see vascular surgery next month.\par \par Podiatry: Ramirez\megan Vascular surgery: ?\par Previous PCP: Sade\par \par

## 2023-06-29 ENCOUNTER — RX RENEWAL (OUTPATIENT)
Age: 53
End: 2023-06-29

## 2023-07-27 ENCOUNTER — APPOINTMENT (OUTPATIENT)
Dept: ENDOCRINOLOGY | Facility: CLINIC | Age: 53
End: 2023-07-27
Payer: COMMERCIAL

## 2023-07-27 VITALS — BODY MASS INDEX: 40.02 KG/M2 | WEIGHT: 302 LBS | HEIGHT: 73 IN

## 2023-07-27 VITALS — DIASTOLIC BLOOD PRESSURE: 96 MMHG | SYSTOLIC BLOOD PRESSURE: 132 MMHG

## 2023-07-27 VITALS — OXYGEN SATURATION: 98 % | HEART RATE: 90 BPM

## 2023-07-27 PROCEDURE — 99215 OFFICE O/P EST HI 40 MIN: CPT | Mod: 25

## 2023-07-27 PROCEDURE — 95251 CONT GLUC MNTR ANALYSIS I&R: CPT

## 2023-07-27 NOTE — ASSESSMENT
[Exercise/Effect on Glucose] : exercise/effect on glucose [Self Monitoring of Blood Glucose] : self monitoring of blood glucose [Retinopathy Screening] : Patient was referred to ophthalmology for retinopathy screening [Weight Loss] : weight loss [FreeTextEntry1] : DM2 mildly uncontrolled in patient with HTN,. HLD and obesity. He has medtronic pump and no CGM.  \par \par DM2 :  a1c last 8 lbs\par pump report reviewed. he has bgs hyperg w each meal 180-220.Pt  takes 10-12 u insulin per meal. \par H estimates with " fist size". \par take all boluses 10-15 min before  meals. \par increase ozempic to 2 mg weekly D  \par decrease  mg TID  \par continue current dose of basal insulins\par he has multiple skin infections at the site on the abdomen  . advsied to rotate every 2 days \par continue ARB and  MVI \par diabetic  neuropathy: monitor clinically. \par R ankle necrobiosis lipoidica, chronic lesions, non infected now\par This patient with diabetes performs 4 or more glucose checks per day utilizing a home blood glucose monitor. The patient is treated with insulin via 3 or more injections daily (or a subcutaneous insulin infusion pump.) This patient requires frequent adjustments to their insulin treatment on the basis of therapeutic continuous glucose monitoring results. In addition, the patient has been to our office for an evaluation of their diabetes control within the past 6 months. \par \par \par HTN ;  bp at target on meds. Continue current management.\par I advised low fat/low cholesterol diet, low salt diet\par \par HLD: LDL at target. continue rosuvastatin \par low fat/low cholesterol diet \par \par Obesity : gained 11 lbs. encouraged more exercise walking 30 min 3 x week\par \par Morbid obesity: \par discussed diet and exercise\par encouraged more exercise walking 30 min 3 x week\par \par

## 2023-07-27 NOTE — PHYSICAL EXAM
[Alert] : alert [Well Nourished] : well nourished [Obese] : obese [No Acute Distress] : no acute distress [Well Developed] : well developed [Normal Sclera/Conjunctiva] : normal sclera/conjunctiva [EOMI] : extra ocular movement intact [No Proptosis] : no proptosis [Normal Oropharynx] : the oropharynx was normal [Thyroid Not Enlarged] : the thyroid was not enlarged [No Thyroid Nodules] : no palpable thyroid nodules [No Respiratory Distress] : no respiratory distress [No Accessory Muscle Use] : no accessory muscle use [Clear to Auscultation] : lungs were clear to auscultation bilaterally [Normal S1, S2] : normal S1 and S2 [Normal Rate] : heart rate was normal [Regular Rhythm] : with a regular rhythm [No Edema] : no peripheral edema [Pedal Pulses Normal] : the pedal pulses are present [Normal Bowel Sounds] : normal bowel sounds [Not Tender] : non-tender [Not Distended] : not distended [Soft] : abdomen soft [Normal Anterior Cervical Nodes] : no anterior cervical lymphadenopathy [Normal Gait] : normal gait [Acanthosis Nigricans] : acanthosis nigricans present [No Tremors] : no tremors [Oriented x3] : oriented to person, place, and time [de-identified] : acanthosis eyes with edema B/L lids ( allergies)  [de-identified] : R foot stasis dermatitis

## 2023-07-27 NOTE — PHYSICAL EXAM
[Alert] : alert [Well Nourished] : well nourished [Obese] : obese [No Acute Distress] : no acute distress [Well Developed] : well developed [Normal Sclera/Conjunctiva] : normal sclera/conjunctiva [EOMI] : extra ocular movement intact [No Proptosis] : no proptosis [Normal Oropharynx] : the oropharynx was normal [Thyroid Not Enlarged] : the thyroid was not enlarged [No Thyroid Nodules] : no palpable thyroid nodules [No Respiratory Distress] : no respiratory distress [No Accessory Muscle Use] : no accessory muscle use [Clear to Auscultation] : lungs were clear to auscultation bilaterally [Normal S1, S2] : normal S1 and S2 [Normal Rate] : heart rate was normal [Regular Rhythm] : with a regular rhythm [No Edema] : no peripheral edema [Pedal Pulses Normal] : the pedal pulses are present [Normal Bowel Sounds] : normal bowel sounds [Not Tender] : non-tender [Not Distended] : not distended [Soft] : abdomen soft [Normal Anterior Cervical Nodes] : no anterior cervical lymphadenopathy [Normal Gait] : normal gait [Acanthosis Nigricans] : acanthosis nigricans present [No Tremors] : no tremors [Oriented x3] : oriented to person, place, and time [de-identified] : acanthosis eyes with edema B/L lids ( allergies)  [de-identified] : R foot stasis dermatitis

## 2023-08-09 ENCOUNTER — RX RENEWAL (OUTPATIENT)
Age: 53
End: 2023-08-09

## 2023-09-29 ENCOUNTER — APPOINTMENT (OUTPATIENT)
Dept: ENDOCRINOLOGY | Facility: CLINIC | Age: 53
End: 2023-09-29

## 2023-10-13 ENCOUNTER — RX RENEWAL (OUTPATIENT)
Age: 53
End: 2023-10-13

## 2023-10-26 ENCOUNTER — APPOINTMENT (OUTPATIENT)
Dept: FAMILY MEDICINE | Facility: CLINIC | Age: 53
End: 2023-10-26
Payer: COMMERCIAL

## 2023-10-26 VITALS — SYSTOLIC BLOOD PRESSURE: 138 MMHG | DIASTOLIC BLOOD PRESSURE: 86 MMHG

## 2023-10-26 VITALS
BODY MASS INDEX: 41.75 KG/M2 | DIASTOLIC BLOOD PRESSURE: 93 MMHG | HEIGHT: 73 IN | WEIGHT: 315 LBS | SYSTOLIC BLOOD PRESSURE: 154 MMHG | HEART RATE: 85 BPM

## 2023-10-26 DIAGNOSIS — Z12.11 ENCOUNTER FOR SCREENING FOR MALIGNANT NEOPLASM OF COLON: ICD-10-CM

## 2023-10-26 DIAGNOSIS — R22.1 LOCALIZED SWELLING, MASS AND LUMP, NECK: ICD-10-CM

## 2023-10-26 DIAGNOSIS — Z00.00 ENCOUNTER FOR GENERAL ADULT MEDICAL EXAMINATION W/OUT ABNORMAL FINDINGS: ICD-10-CM

## 2023-10-26 DIAGNOSIS — J45.909 UNSPECIFIED ASTHMA, UNCOMPLICATED: ICD-10-CM

## 2023-10-26 PROCEDURE — 90662 IIV NO PRSV INCREASED AG IM: CPT

## 2023-10-26 PROCEDURE — 99396 PREV VISIT EST AGE 40-64: CPT | Mod: 25

## 2023-10-26 PROCEDURE — 99214 OFFICE O/P EST MOD 30 MIN: CPT | Mod: 25

## 2023-10-26 PROCEDURE — G0008: CPT

## 2023-10-26 RX ORDER — HYDROCHLOROTHIAZIDE 12.5 MG/1
12.5 CAPSULE ORAL
Qty: 90 | Refills: 1 | Status: DISCONTINUED | COMMUNITY
Start: 2022-06-14 | End: 2023-10-26

## 2023-11-29 ENCOUNTER — APPOINTMENT (OUTPATIENT)
Dept: ENDOCRINOLOGY | Facility: CLINIC | Age: 53
End: 2023-11-29
Payer: COMMERCIAL

## 2023-11-29 VITALS
BODY MASS INDEX: 41.08 KG/M2 | HEIGHT: 73 IN | WEIGHT: 310 LBS | SYSTOLIC BLOOD PRESSURE: 130 MMHG | HEART RATE: 80 BPM | OXYGEN SATURATION: 99 % | TEMPERATURE: 98 F | DIASTOLIC BLOOD PRESSURE: 80 MMHG | RESPIRATION RATE: 16 BRPM

## 2023-11-29 LAB — GLUCOSE BLDC GLUCOMTR-MCNC: 68

## 2023-11-29 PROCEDURE — 99215 OFFICE O/P EST HI 40 MIN: CPT | Mod: 25

## 2023-11-29 PROCEDURE — 82962 GLUCOSE BLOOD TEST: CPT

## 2023-11-29 PROCEDURE — 99205 OFFICE O/P NEW HI 60 MIN: CPT | Mod: 25

## 2023-11-29 PROCEDURE — G0447 BEHAVIOR COUNSEL OBESITY 15M: CPT | Mod: 59

## 2023-11-29 PROCEDURE — 95251 CONT GLUC MNTR ANALYSIS I&R: CPT

## 2023-12-01 ENCOUNTER — APPOINTMENT (OUTPATIENT)
Dept: ULTRASOUND IMAGING | Facility: CLINIC | Age: 53
End: 2023-12-01
Payer: COMMERCIAL

## 2023-12-01 ENCOUNTER — OUTPATIENT (OUTPATIENT)
Dept: OUTPATIENT SERVICES | Facility: HOSPITAL | Age: 53
LOS: 1 days | End: 2023-12-01

## 2023-12-01 LAB
ALBUMIN SERPL ELPH-MCNC: 4.5 G/DL
ALP BLD-CCNC: 60 U/L
ALT SERPL-CCNC: 26 U/L
ANION GAP SERPL CALC-SCNC: 8 MMOL/L
AST SERPL-CCNC: 17 U/L
BILIRUB SERPL-MCNC: 0.4 MG/DL
BUN SERPL-MCNC: 20 MG/DL
CALCIUM SERPL-MCNC: 10.1 MG/DL
CHLORIDE SERPL-SCNC: 103 MMOL/L
CHOLEST SERPL-MCNC: 138 MG/DL
CO2 SERPL-SCNC: 30 MMOL/L
CREAT SERPL-MCNC: 1.19 MG/DL
CREAT SPEC-SCNC: 90 MG/DL
EGFR: 74 ML/MIN/1.73M2
ESTIMATED AVERAGE GLUCOSE: 212 MG/DL
GLUCOSE SERPL-MCNC: 137 MG/DL
HBA1C MFR BLD HPLC: 9 %
HCT VFR BLD CALC: 39.9 %
HDLC SERPL-MCNC: 60 MG/DL
HGB BLD-MCNC: 12.6 G/DL
LDLC SERPL CALC-MCNC: 60 MG/DL
MCHC RBC-ENTMCNC: 29.4 PG
MCHC RBC-ENTMCNC: 31.6 GM/DL
MCV RBC AUTO: 93.2 FL
MICROALBUMIN 24H UR DL<=1MG/L-MCNC: <1.2 MG/DL
MICROALBUMIN/CREAT 24H UR-RTO: NORMAL MG/G
NONHDLC SERPL-MCNC: 78 MG/DL
PLATELET # BLD AUTO: 311 K/UL
POTASSIUM SERPL-SCNC: 4.6 MMOL/L
PROT SERPL-MCNC: 7.4 G/DL
RBC # BLD: 4.28 M/UL
RBC # FLD: 13.9 %
SODIUM SERPL-SCNC: 141 MMOL/L
TRIGL SERPL-MCNC: 99 MG/DL
WBC # FLD AUTO: 5.7 K/UL

## 2023-12-01 PROCEDURE — 76536 US EXAM OF HEAD AND NECK: CPT | Mod: 26

## 2023-12-29 ENCOUNTER — RX RENEWAL (OUTPATIENT)
Age: 53
End: 2023-12-29

## 2024-01-22 ENCOUNTER — RX RENEWAL (OUTPATIENT)
Age: 54
End: 2024-01-22

## 2024-01-22 RX ORDER — LANCETS 28 GAUGE
EACH MISCELLANEOUS
Qty: 600 | Refills: 1 | Status: ACTIVE | COMMUNITY
Start: 2022-10-19 | End: 1900-01-01

## 2024-01-22 RX ORDER — BLOOD SUGAR DIAGNOSTIC
STRIP MISCELLANEOUS
Qty: 600 | Refills: 1 | Status: ACTIVE | COMMUNITY
Start: 2021-06-25 | End: 1900-01-01

## 2024-01-22 NOTE — PHYSICAL EXAM
[de-identified] : acanthosis eyes with edema B/L lids ( allergies)  [de-identified] : R foot stasis dermatitis

## 2024-01-22 NOTE — ASSESSMENT
[FreeTextEntry1] : DM2 mildly uncontrolled in patient with HTN,. HLD and obesity. He has medtronic pump and no CGM. He does nto know for sure what medication is taking. Poor knowledge of diabetes.   DM2 :  check labs today    mg BID due to diarrhea at higher doses cont Ozempic to 2 mg /week  pump report reviewed. hyperG with meals 180-300  constantly. continue current dose of basal insulins increase meal boluses to 28 u TID w meals. Evry visit he has different doses he takes/.  continue ARB and MVI diabetic neuropathy: monitor clinically. R ankle necrobiosis lipoidica, chronic lesions, non infected now cgm report revised and d/w pt : time in range 41% and average reading 200.   This patient with diabetes performs 4 or more glucose checks per day utilizing a home blood glucose monitor. The patient is treated with insulin via 3 or more injections daily (or a subcutaneous insulin infusion pump.) This patient requires frequent adjustments to their insulin treatment on the basis of therapeutic continuous glucose monitoring results. In addition, the patient has been to our office for an evaluation of their diabetes control within the past 6 months.  HTN: bp at target on meds. Continue current management.  HLD:  check lipids today continue rosuvastatin. Low fat/low cholesterol diet  Obesity : encouraged more exercise walking 30 min 3 x week  Morbid obesity: discussed diet and exercise encouraged more exercise walking 30 min 3 x week

## 2024-01-23 ENCOUNTER — APPOINTMENT (OUTPATIENT)
Dept: FAMILY MEDICINE | Facility: CLINIC | Age: 54
End: 2024-01-23

## 2024-02-03 ENCOUNTER — RX RENEWAL (OUTPATIENT)
Age: 54
End: 2024-02-03

## 2024-02-19 ENCOUNTER — RX RENEWAL (OUTPATIENT)
Age: 54
End: 2024-02-19

## 2024-02-19 ENCOUNTER — APPOINTMENT (OUTPATIENT)
Dept: FAMILY MEDICINE | Facility: CLINIC | Age: 54
End: 2024-02-19

## 2024-02-19 RX ORDER — SEMAGLUTIDE 2.68 MG/ML
8 INJECTION, SOLUTION SUBCUTANEOUS
Qty: 3 | Refills: 1 | Status: ACTIVE | COMMUNITY
Start: 2022-04-26 | End: 1900-01-01

## 2024-04-01 ENCOUNTER — RX RENEWAL (OUTPATIENT)
Age: 54
End: 2024-04-01

## 2024-04-01 RX ORDER — METOPROLOL SUCCINATE 50 MG/1
50 TABLET, EXTENDED RELEASE ORAL DAILY
Qty: 90 | Refills: 0 | Status: ACTIVE | COMMUNITY
Start: 2022-04-20 | End: 1900-01-01

## 2024-04-01 RX ORDER — HYDROCHLOROTHIAZIDE 25 MG/1
25 TABLET ORAL
Qty: 90 | Refills: 0 | Status: ACTIVE | COMMUNITY
Start: 2023-10-26 | End: 1900-01-01

## 2024-04-23 ENCOUNTER — APPOINTMENT (OUTPATIENT)
Dept: VASCULAR SURGERY | Facility: CLINIC | Age: 54
End: 2024-04-23
Payer: COMMERCIAL

## 2024-04-23 VITALS
WEIGHT: 310 LBS | DIASTOLIC BLOOD PRESSURE: 86 MMHG | HEART RATE: 91 BPM | BODY MASS INDEX: 41.08 KG/M2 | SYSTOLIC BLOOD PRESSURE: 142 MMHG | HEIGHT: 73 IN

## 2024-04-23 DIAGNOSIS — M79.89 OTHER SPECIFIED SOFT TISSUE DISORDERS: ICD-10-CM

## 2024-04-23 PROCEDURE — 99214 OFFICE O/P EST MOD 30 MIN: CPT

## 2024-04-23 NOTE — HISTORY OF PRESENT ILLNESS
[FreeTextEntry1] : 54M presents for follow up. Has area of LLE with darkening and skin changes in gaiter region, however within last year has VI study without reflux. Area has not worsened and he has been wearing compression stockings. No claudication, rest pain or tissue loss.

## 2024-04-23 NOTE — PHYSICAL EXAM
[Normal Rate and Rhythm] : normal rate and rhythm [2+] : left 2+ [Ankle Swelling (On Exam)] : not present [Varicose Veins Of Lower Extremities] : not present [No Rash or Lesion] : No rash or lesion [Alert] : alert [Oriented to Person] : oriented to person [Oriented to Place] : oriented to place [Oriented to Time] : oriented to time [Calm] : calm [de-identified] : NAD [de-identified] : WNL [FreeTextEntry1] : Darkened hyperkeratotic lesion above medial ankle. No wound.

## 2024-04-23 NOTE — ASSESSMENT
[FreeTextEntry1] : 54M with leg swelling and hyperkeratotic lesion on LLE gaiter region. Palpable pedal pulses. VI studies done in last year show no reflux. - Given prescription for compression stockings, and will refer to tactile for pneumatic pumps

## 2024-04-24 ENCOUNTER — APPOINTMENT (OUTPATIENT)
Dept: ENDOCRINOLOGY | Facility: CLINIC | Age: 54
End: 2024-04-24
Payer: COMMERCIAL

## 2024-04-24 ENCOUNTER — APPOINTMENT (OUTPATIENT)
Dept: INTERNAL MEDICINE | Facility: CLINIC | Age: 54
End: 2024-04-24
Payer: COMMERCIAL

## 2024-04-24 VITALS
HEIGHT: 73 IN | WEIGHT: 315 LBS | OXYGEN SATURATION: 99 % | DIASTOLIC BLOOD PRESSURE: 78 MMHG | RESPIRATION RATE: 16 BRPM | BODY MASS INDEX: 41.75 KG/M2 | SYSTOLIC BLOOD PRESSURE: 148 MMHG | HEART RATE: 80 BPM

## 2024-04-24 VITALS
SYSTOLIC BLOOD PRESSURE: 155 MMHG | BODY MASS INDEX: 41.08 KG/M2 | DIASTOLIC BLOOD PRESSURE: 72 MMHG | HEIGHT: 73 IN | WEIGHT: 310 LBS

## 2024-04-24 DIAGNOSIS — E53.8 DEFICIENCY OF OTHER SPECIFIED B GROUP VITAMINS: ICD-10-CM

## 2024-04-24 DIAGNOSIS — Z79.4 TYPE 2 DIABETES MELLITUS WITH HYPERGLYCEMIA: ICD-10-CM

## 2024-04-24 DIAGNOSIS — E83.52 HYPERCALCEMIA: ICD-10-CM

## 2024-04-24 DIAGNOSIS — E11.65 TYPE 2 DIABETES MELLITUS WITH HYPERGLYCEMIA: ICD-10-CM

## 2024-04-24 DIAGNOSIS — H66.90 OTITIS MEDIA, UNSPECIFIED, UNSPECIFIED EAR: ICD-10-CM

## 2024-04-24 DIAGNOSIS — E78.5 HYPERLIPIDEMIA, UNSPECIFIED: ICD-10-CM

## 2024-04-24 DIAGNOSIS — I10 ESSENTIAL (PRIMARY) HYPERTENSION: ICD-10-CM

## 2024-04-24 PROCEDURE — 95251 CONT GLUC MNTR ANALYSIS I&R: CPT

## 2024-04-24 PROCEDURE — 36415 COLL VENOUS BLD VENIPUNCTURE: CPT

## 2024-04-24 PROCEDURE — 99214 OFFICE O/P EST MOD 30 MIN: CPT

## 2024-04-24 PROCEDURE — 99215 OFFICE O/P EST HI 40 MIN: CPT

## 2024-04-24 RX ORDER — ALCOHOL ANTISEPTIC PADS
70 PADS, MEDICATED (EA) TOPICAL
Qty: 600 | Refills: 1 | Status: DISCONTINUED | COMMUNITY
Start: 2022-10-19 | End: 2024-04-24

## 2024-04-24 RX ORDER — AMOXICILLIN AND CLAVULANATE POTASSIUM 875; 125 MG/1; MG/1
875-125 TABLET, COATED ORAL
Qty: 14 | Refills: 0 | Status: ACTIVE | COMMUNITY
Start: 2024-04-24 | End: 1900-01-01

## 2024-04-24 RX ORDER — VALSARTAN 320 MG/1
320 TABLET, COATED ORAL
Qty: 90 | Refills: 1 | Status: ACTIVE | COMMUNITY
Start: 2020-12-04 | End: 1900-01-01

## 2024-04-24 NOTE — HISTORY OF PRESENT ILLNESS
[FreeTextEntry1] : Follow up [de-identified] : Mr. JANIS LEON is a pleasant 54-year-old male with PMH of HLD, DM, HTN, asthma, last crisis last spring who comes into the office to follow up in medical conditions. Patient has a continuous glucose monitor and is treated with insulin via a subcutaneous insulin infusion pump, managed by endocrinology Dr Cross. Reports that has had URI symptoms since 1 week now.  Patient complaints of stuffy nose, left ear pain, cough with yellowish phlegm, sore throat. Denies fever, shortness of breath, ear discharge, recent travels. HIs niece is sick.  Patient is tolerating the oral diet. Patient is COVID-19 vaccinated recently and Flu vaccinated recently.    Podiatry: Ramirez Vascular surgery: ? Previous PCP: Sade

## 2024-04-24 NOTE — PLAN
[FreeTextEntry1] : HTN Not in target today, could be due to his recent otitis symptoms.  Continue HCTZ 25 mg QD Continue Valsartan 320 mg QD. Continue metoprolol ER 50 mg QD Off amlodipine due to leg swelling  DM with HLD Continue home glucose monitoring. Sees endocrinology. Repeat A1C and lipids + microalb/creat ratio, already on Valsartan Sees podiatry, will need records. Sees ophthalmology, will need records. Continue Ozempic 2 mg Q/week, med adherence advised. Continue metformin 500 mg BID Continue Novolog via insulin pump. Continue Rosuvastatin 40 mg QD. Med adherence advised.  Asthma Controlled Continue current meds  Will see Dr Cross endocrinology today.  Return to care: within 3 months for follow up or earlier if needed Call or return for any questions.

## 2024-04-24 NOTE — PHYSICAL EXAM
[Alert] : alert [Well Nourished] : well nourished [Obese] : obese [No Acute Distress] : no acute distress [Well Developed] : well developed [Normal Sclera/Conjunctiva] : normal sclera/conjunctiva [EOMI] : extra ocular movement intact [No Proptosis] : no proptosis [Normal Oropharynx] : the oropharynx was normal [Thyroid Not Enlarged] : the thyroid was not enlarged [No Thyroid Nodules] : no palpable thyroid nodules [No Respiratory Distress] : no respiratory distress [No Accessory Muscle Use] : no accessory muscle use [Clear to Auscultation] : lungs were clear to auscultation bilaterally [Normal S1, S2] : normal S1 and S2 [Normal Rate] : heart rate was normal [Regular Rhythm] : with a regular rhythm [No Edema] : no peripheral edema [Pedal Pulses Normal] : the pedal pulses are present [Normal Bowel Sounds] : normal bowel sounds [Not Tender] : non-tender [Not Distended] : not distended [Soft] : abdomen soft [Normal Gait] : normal gait [Acanthosis Nigricans] : acanthosis nigricans present [No Tremors] : no tremors [Oriented x3] : oriented to person, place, and time [de-identified] : acanthosis eyes with edema B/L lids ( allergies)  [de-identified] : R foot stasis dermatitis ,R ankle necrobiosis lipoidica

## 2024-04-24 NOTE — ASSESSMENT
[Exercise/Effect on Glucose] : exercise/effect on glucose [Self Monitoring of Blood Glucose] : self monitoring of blood glucose [Retinopathy Screening] : Patient was referred to ophthalmology for retinopathy screening [Weight Loss] : weight loss [FreeTextEntry1] : DM2 mildly uncontrolled in patient with HTN,. HLD and obesity. He has medtronic pump and no CGM. He does not know for sure what medication is taking. Poor knowledge of diabetes.  Came w no labs again.   DM2 : labs pending with PCP .     mg BID due to diarrhea at higher doses cont Ozempic to 2 mg /week  pump report reviewed.  continue current dose of basal insulins increase meal boluses to 28-28-30 u TID w meals. Take boluses before meals to avoid hypoG after meals. .  stop orange juice, he does not have any diet restrictions !!!  Every visit he says different doses. Probably changing doses all the time.   continue ARB and MVI diabetic neuropathy: monitor clinically. Follow-up with podiatrist   This patient with diabetes performs 4 or more glucose checks per day utilizing a home blood glucose monitor. The patient is treated with insulin via 3 or more injections daily (or a subcutaneous insulin infusion pump.) This patient requires frequent adjustments to their insulin treatment on the basis of therapeutic continuous glucose monitoring results. In addition, the patient has been to our office for an evaluation of their diabetes control within the past 6 months.  HTN: bp at target on meds. Continue current management.  HLD: labs pending. Continue rosuvastatin. Low fat/low cholesterol diet  Obesity : encouraged more exercise walking 30 min 3 x week  Morbid obesity: discussed diet and exercise encouraged more exercise walking 30 min 3 x week

## 2024-04-24 NOTE — HEALTH RISK ASSESSMENT
[0] : 2) Feeling down, depressed, or hopeless: Not at all (0) [PHQ-2 Negative - No further assessment needed] : PHQ-2 Negative - No further assessment needed [Never] : Never [[id number]] : 0

## 2024-04-25 LAB
ANION GAP SERPL CALC-SCNC: 12 MMOL/L
BUN SERPL-MCNC: 15 MG/DL
CALCIUM SERPL-MCNC: 9.6 MG/DL
CHLORIDE SERPL-SCNC: 102 MMOL/L
CHOLEST SERPL-MCNC: 114 MG/DL
CO2 SERPL-SCNC: 26 MMOL/L
CREAT SERPL-MCNC: 1.04 MG/DL
EGFR: 86 ML/MIN/1.73M2
ESTIMATED AVERAGE GLUCOSE: 209 MG/DL
GLUCOSE SERPL-MCNC: 104 MG/DL
HBA1C MFR BLD HPLC: 8.9 %
HDLC SERPL-MCNC: 45 MG/DL
LDLC SERPL CALC-MCNC: 45 MG/DL
NONHDLC SERPL-MCNC: 69 MG/DL
POTASSIUM SERPL-SCNC: 3.9 MMOL/L
SODIUM SERPL-SCNC: 140 MMOL/L
TRIGL SERPL-MCNC: 138 MG/DL

## 2024-04-25 RX ORDER — METFORMIN ER 500 MG 500 MG/1
500 TABLET ORAL
Qty: 270 | Refills: 1 | Status: ACTIVE | COMMUNITY
Start: 2024-04-25 | End: 1900-01-01

## 2024-04-25 RX ORDER — METFORMIN HYDROCHLORIDE 500 MG/1
500 TABLET, COATED ORAL
Qty: 270 | Refills: 1 | Status: COMPLETED | COMMUNITY
Start: 2022-01-17 | End: 2024-04-25

## 2024-04-26 RX ORDER — ALBUTEROL SULFATE 90 UG/1
108 (90 BASE) INHALANT RESPIRATORY (INHALATION)
Qty: 1 | Refills: 1 | Status: ACTIVE | COMMUNITY
Start: 2022-04-20 | End: 1900-01-01

## 2024-04-29 ENCOUNTER — RX RENEWAL (OUTPATIENT)
Age: 54
End: 2024-04-29

## 2024-04-29 RX ORDER — INSULIN ASPART 100 [IU]/ML
100 INJECTION, SOLUTION INTRAVENOUS; SUBCUTANEOUS
Qty: 180 | Refills: 2 | Status: ACTIVE | COMMUNITY
Start: 2022-09-07 | End: 1900-01-01

## 2024-07-06 ENCOUNTER — RX RENEWAL (OUTPATIENT)
Age: 54
End: 2024-07-06

## 2024-09-20 ENCOUNTER — APPOINTMENT (OUTPATIENT)
Dept: INTERNAL MEDICINE | Facility: CLINIC | Age: 54
End: 2024-09-20

## 2024-10-09 ENCOUNTER — APPOINTMENT (OUTPATIENT)
Dept: INTERNAL MEDICINE | Facility: CLINIC | Age: 54
End: 2024-10-09

## 2024-10-17 DIAGNOSIS — J45.909 UNSPECIFIED ASTHMA, UNCOMPLICATED: ICD-10-CM

## 2024-10-17 RX ORDER — ALBUTEROL SULFATE 90 UG/1
108 (90 BASE) POWDER, METERED RESPIRATORY (INHALATION) EVERY 4 HOURS
Qty: 3 | Refills: 2 | Status: ACTIVE | COMMUNITY
Start: 2024-10-17 | End: 1900-01-01

## 2024-10-30 ENCOUNTER — APPOINTMENT (OUTPATIENT)
Dept: INTERNAL MEDICINE | Facility: CLINIC | Age: 54
End: 2024-10-30
Payer: COMMERCIAL

## 2024-10-30 VITALS
HEART RATE: 89 BPM | WEIGHT: 315 LBS | SYSTOLIC BLOOD PRESSURE: 107 MMHG | DIASTOLIC BLOOD PRESSURE: 70 MMHG | HEIGHT: 73 IN | BODY MASS INDEX: 41.75 KG/M2

## 2024-10-30 DIAGNOSIS — I10 ESSENTIAL (PRIMARY) HYPERTENSION: ICD-10-CM

## 2024-10-30 DIAGNOSIS — E11.65 TYPE 2 DIABETES MELLITUS WITH HYPERGLYCEMIA: ICD-10-CM

## 2024-10-30 DIAGNOSIS — J45.909 UNSPECIFIED ASTHMA, UNCOMPLICATED: ICD-10-CM

## 2024-10-30 DIAGNOSIS — Z79.4 TYPE 2 DIABETES MELLITUS WITH HYPERGLYCEMIA: ICD-10-CM

## 2024-10-30 DIAGNOSIS — E83.52 HYPERCALCEMIA: ICD-10-CM

## 2024-10-30 DIAGNOSIS — E53.8 DEFICIENCY OF OTHER SPECIFIED B GROUP VITAMINS: ICD-10-CM

## 2024-10-30 DIAGNOSIS — E78.5 HYPERLIPIDEMIA, UNSPECIFIED: ICD-10-CM

## 2024-10-30 PROCEDURE — 99214 OFFICE O/P EST MOD 30 MIN: CPT

## 2024-10-30 PROCEDURE — 36415 COLL VENOUS BLD VENIPUNCTURE: CPT

## 2024-11-05 ENCOUNTER — NON-APPOINTMENT (OUTPATIENT)
Age: 54
End: 2024-11-05

## 2024-11-05 LAB
ALBUMIN SERPL ELPH-MCNC: 4.4 G/DL
ALP BLD-CCNC: 84 U/L
ALT SERPL-CCNC: 16 U/L
ANION GAP SERPL CALC-SCNC: 15 MMOL/L
AST SERPL-CCNC: 20 U/L
BASOPHILS # BLD AUTO: 0.02 K/UL
BASOPHILS NFR BLD AUTO: 0.3 %
BILIRUB SERPL-MCNC: 0.2 MG/DL
BUN SERPL-MCNC: 20 MG/DL
CALCIUM SERPL-MCNC: 9.5 MG/DL
CHLORIDE SERPL-SCNC: 101 MMOL/L
CO2 SERPL-SCNC: 24 MMOL/L
CREAT SERPL-MCNC: 1.1 MG/DL
EGFR: 80 ML/MIN/1.73M2
EOSINOPHIL # BLD AUTO: 0.12 K/UL
EOSINOPHIL NFR BLD AUTO: 2 %
ESTIMATED AVERAGE GLUCOSE: 194 MG/DL
GLUCOSE SERPL-MCNC: 208 MG/DL
HBA1C MFR BLD HPLC: 8.4 %
HCT VFR BLD CALC: 39.6 %
HGB BLD-MCNC: 12.6 G/DL
IMM GRANULOCYTES NFR BLD AUTO: 0.3 %
LYMPHOCYTES # BLD AUTO: 1.74 K/UL
LYMPHOCYTES NFR BLD AUTO: 28.6 %
MAN DIFF?: NORMAL
MCHC RBC-ENTMCNC: 29.2 PG
MCHC RBC-ENTMCNC: 31.8 G/DL
MCV RBC AUTO: 91.7 FL
MONOCYTES # BLD AUTO: 0.54 K/UL
MONOCYTES NFR BLD AUTO: 8.9 %
NEUTROPHILS # BLD AUTO: 3.64 K/UL
NEUTROPHILS NFR BLD AUTO: 59.9 %
PLATELET # BLD AUTO: 308 K/UL
POTASSIUM SERPL-SCNC: 4.3 MMOL/L
PROT SERPL-MCNC: 7.4 G/DL
RBC # BLD: 4.32 M/UL
RBC # FLD: 14.1 %
SODIUM SERPL-SCNC: 139 MMOL/L
TSH SERPL-ACNC: 1.03 UIU/ML
WBC # FLD AUTO: 6.08 K/UL

## 2024-11-08 ENCOUNTER — APPOINTMENT (OUTPATIENT)
Dept: VASCULAR SURGERY | Facility: CLINIC | Age: 54
End: 2024-11-08

## 2024-11-08 ENCOUNTER — APPOINTMENT (OUTPATIENT)
Dept: VASCULAR SURGERY | Facility: CLINIC | Age: 54
End: 2024-11-08
Payer: COMMERCIAL

## 2024-11-08 VITALS
DIASTOLIC BLOOD PRESSURE: 85 MMHG | WEIGHT: 315 LBS | SYSTOLIC BLOOD PRESSURE: 163 MMHG | HEART RATE: 98 BPM | OXYGEN SATURATION: 97 % | HEIGHT: 73 IN | BODY MASS INDEX: 41.75 KG/M2

## 2024-11-08 DIAGNOSIS — I89.0 LYMPHEDEMA, NOT ELSEWHERE CLASSIFIED: ICD-10-CM

## 2024-11-08 PROCEDURE — 99214 OFFICE O/P EST MOD 30 MIN: CPT

## 2024-11-11 PROBLEM — I89.0 LYMPHEDEMA: Status: ACTIVE | Noted: 2024-11-11

## 2024-11-17 ENCOUNTER — INPATIENT (INPATIENT)
Facility: HOSPITAL | Age: 54
LOS: 4 days | Discharge: HOME | DRG: 853 | End: 2024-11-22
Attending: INTERNAL MEDICINE | Admitting: HOSPITALIST
Payer: COMMERCIAL

## 2024-11-17 VITALS
RESPIRATION RATE: 20 BRPM | SYSTOLIC BLOOD PRESSURE: 125 MMHG | DIASTOLIC BLOOD PRESSURE: 75 MMHG | HEART RATE: 109 BPM | WEIGHT: 310.85 LBS | HEIGHT: 74 IN | TEMPERATURE: 98 F | OXYGEN SATURATION: 97 %

## 2024-11-17 DIAGNOSIS — E11.621 TYPE 2 DIABETES MELLITUS WITH FOOT ULCER: ICD-10-CM

## 2024-11-17 LAB
A1C WITH ESTIMATED AVERAGE GLUCOSE RESULT: 8.8 % — HIGH (ref 4–5.6)
ABO RH CONFIRMATION: SIGNIFICANT CHANGE UP
ALBUMIN SERPL ELPH-MCNC: 4 G/DL — SIGNIFICANT CHANGE UP (ref 3.3–5.2)
ALP SERPL-CCNC: 137 U/L — HIGH (ref 40–120)
ALT FLD-CCNC: 28 U/L — SIGNIFICANT CHANGE UP
ANION GAP SERPL CALC-SCNC: 19 MMOL/L — HIGH (ref 5–17)
APTT BLD: 28.8 SEC — SIGNIFICANT CHANGE UP (ref 24.5–35.6)
APTT BLD: 31 SEC — SIGNIFICANT CHANGE UP (ref 24.5–35.6)
AST SERPL-CCNC: 26 U/L — SIGNIFICANT CHANGE UP
BASOPHILS # BLD AUTO: 0.03 K/UL — SIGNIFICANT CHANGE UP (ref 0–0.2)
BASOPHILS NFR BLD AUTO: 0.2 % — SIGNIFICANT CHANGE UP (ref 0–2)
BILIRUB SERPL-MCNC: 0.4 MG/DL — SIGNIFICANT CHANGE UP (ref 0.4–2)
BLD GP AB SCN SERPL QL: SIGNIFICANT CHANGE UP
BUN SERPL-MCNC: 15.2 MG/DL — SIGNIFICANT CHANGE UP (ref 8–20)
CALCIUM SERPL-MCNC: 9.5 MG/DL — SIGNIFICANT CHANGE UP (ref 8.4–10.5)
CHLORIDE SERPL-SCNC: 96 MMOL/L — SIGNIFICANT CHANGE UP (ref 96–108)
CO2 SERPL-SCNC: 22 MMOL/L — SIGNIFICANT CHANGE UP (ref 22–29)
CREAT SERPL-MCNC: 1.36 MG/DL — HIGH (ref 0.5–1.3)
CRP SERPL-MCNC: 401 MG/L — HIGH
EGFR: 62 ML/MIN/1.73M2 — SIGNIFICANT CHANGE UP
EOSINOPHIL # BLD AUTO: 0.03 K/UL — SIGNIFICANT CHANGE UP (ref 0–0.5)
EOSINOPHIL NFR BLD AUTO: 0.2 % — SIGNIFICANT CHANGE UP (ref 0–6)
ERYTHROCYTE [SEDIMENTATION RATE] IN BLOOD: 98 MM/HR — HIGH (ref 0–15)
ESTIMATED AVERAGE GLUCOSE: 206 MG/DL — HIGH (ref 68–114)
GLUCOSE BLDC GLUCOMTR-MCNC: 236 MG/DL — HIGH (ref 70–99)
GLUCOSE BLDC GLUCOMTR-MCNC: 262 MG/DL — HIGH (ref 70–99)
GLUCOSE BLDC GLUCOMTR-MCNC: 298 MG/DL — HIGH (ref 70–99)
GLUCOSE BLDC GLUCOMTR-MCNC: 91 MG/DL — SIGNIFICANT CHANGE UP (ref 70–99)
GLUCOSE SERPL-MCNC: 86 MG/DL — SIGNIFICANT CHANGE UP (ref 70–99)
HCT VFR BLD CALC: 34.6 % — LOW (ref 39–50)
HGB BLD-MCNC: 11.9 G/DL — LOW (ref 13–17)
IMM GRANULOCYTES NFR BLD AUTO: 0.6 % — SIGNIFICANT CHANGE UP (ref 0–0.9)
INR BLD: 1.23 RATIO — HIGH (ref 0.85–1.16)
INR BLD: 1.24 RATIO — HIGH (ref 0.85–1.16)
LACTATE SERPL-SCNC: 1.4 MMOL/L — SIGNIFICANT CHANGE UP (ref 0.5–2)
LYMPHOCYTES # BLD AUTO: 0.88 K/UL — LOW (ref 1–3.3)
LYMPHOCYTES # BLD AUTO: 6.7 % — LOW (ref 13–44)
MCHC RBC-ENTMCNC: 29.2 PG — SIGNIFICANT CHANGE UP (ref 27–34)
MCHC RBC-ENTMCNC: 34.4 G/DL — SIGNIFICANT CHANGE UP (ref 32–36)
MCV RBC AUTO: 85 FL — SIGNIFICANT CHANGE UP (ref 80–100)
MONOCYTES # BLD AUTO: 1.2 K/UL — HIGH (ref 0–0.9)
MONOCYTES NFR BLD AUTO: 9.1 % — SIGNIFICANT CHANGE UP (ref 2–14)
NEUTROPHILS # BLD AUTO: 11 K/UL — HIGH (ref 1.8–7.4)
NEUTROPHILS NFR BLD AUTO: 83.2 % — HIGH (ref 43–77)
PLATELET # BLD AUTO: 410 K/UL — HIGH (ref 150–400)
POTASSIUM SERPL-MCNC: 3.6 MMOL/L — SIGNIFICANT CHANGE UP (ref 3.5–5.3)
POTASSIUM SERPL-SCNC: 3.6 MMOL/L — SIGNIFICANT CHANGE UP (ref 3.5–5.3)
PROCALCITONIN SERPL-MCNC: 0.28 NG/ML — HIGH (ref 0.02–0.1)
PROT SERPL-MCNC: 8.5 G/DL — SIGNIFICANT CHANGE UP (ref 6.6–8.7)
PROTHROM AB SERPL-ACNC: 14.2 SEC — HIGH (ref 9.9–13.4)
PROTHROM AB SERPL-ACNC: 14.3 SEC — HIGH (ref 9.9–13.4)
RBC # BLD: 4.07 M/UL — LOW (ref 4.2–5.8)
RBC # FLD: 13.2 % — SIGNIFICANT CHANGE UP (ref 10.3–14.5)
SODIUM SERPL-SCNC: 137 MMOL/L — SIGNIFICANT CHANGE UP (ref 135–145)
WBC # BLD: 13.22 K/UL — HIGH (ref 3.8–10.5)
WBC # FLD AUTO: 13.22 K/UL — HIGH (ref 3.8–10.5)

## 2024-11-17 PROCEDURE — 99253 IP/OBS CNSLTJ NEW/EST LOW 45: CPT | Mod: 57

## 2024-11-17 PROCEDURE — 99285 EMERGENCY DEPT VISIT HI MDM: CPT

## 2024-11-17 PROCEDURE — 73630 X-RAY EXAM OF FOOT: CPT | Mod: 26,RT

## 2024-11-17 PROCEDURE — 88305 TISSUE EXAM BY PATHOLOGIST: CPT | Mod: 26

## 2024-11-17 PROCEDURE — 99223 1ST HOSP IP/OBS HIGH 75: CPT

## 2024-11-17 PROCEDURE — 88311 DECALCIFY TISSUE: CPT | Mod: 26

## 2024-11-17 PROCEDURE — 73700 CT LOWER EXTREMITY W/O DYE: CPT | Mod: 26,RT

## 2024-11-17 PROCEDURE — 93010 ELECTROCARDIOGRAM REPORT: CPT

## 2024-11-17 RX ORDER — PIPERACILLIN SODIUM AND TAZOBACTAM SODIUM 4; .5 G/20ML; G/20ML
3.38 INJECTION, POWDER, LYOPHILIZED, FOR SOLUTION INTRAVENOUS EVERY 8 HOURS
Refills: 0 | Status: DISCONTINUED | OUTPATIENT
Start: 2024-11-17 | End: 2024-11-18

## 2024-11-17 RX ORDER — ENOXAPARIN SODIUM 30 MG/.3ML
40 INJECTION SUBCUTANEOUS EVERY 24 HOURS
Refills: 0 | Status: DISCONTINUED | OUTPATIENT
Start: 2024-11-17 | End: 2024-11-18

## 2024-11-17 RX ORDER — SODIUM CHLORIDE 9 MG/ML
1000 INJECTION, SOLUTION INTRAMUSCULAR; INTRAVENOUS; SUBCUTANEOUS
Refills: 0 | Status: DISCONTINUED | OUTPATIENT
Start: 2024-11-17 | End: 2024-11-18

## 2024-11-17 RX ORDER — ACETAMINOPHEN 500MG 500 MG/1
650 TABLET, COATED ORAL EVERY 6 HOURS
Refills: 0 | Status: DISCONTINUED | OUTPATIENT
Start: 2024-11-17 | End: 2024-11-18

## 2024-11-17 RX ORDER — ROSUVASTATIN CALCIUM 5 MG/1
1 TABLET, FILM COATED ORAL
Refills: 0 | DISCHARGE

## 2024-11-17 RX ORDER — METOPROLOL TARTRATE 100 MG/1
1 TABLET, FILM COATED ORAL
Refills: 0 | DISCHARGE

## 2024-11-17 RX ORDER — ALBUTEROL 90 MCG
1 AEROSOL (GRAM) INHALATION EVERY 4 HOURS
Refills: 0 | Status: DISCONTINUED | OUTPATIENT
Start: 2024-11-17 | End: 2024-11-18

## 2024-11-17 RX ORDER — PIPERACILLIN SODIUM AND TAZOBACTAM SODIUM 4; .5 G/20ML; G/20ML
3.38 INJECTION, POWDER, LYOPHILIZED, FOR SOLUTION INTRAVENOUS ONCE
Refills: 0 | Status: COMPLETED | OUTPATIENT
Start: 2024-11-17 | End: 2024-11-17

## 2024-11-17 RX ORDER — HYDROCHLOROTHIAZIDE 50 MG
1 TABLET ORAL
Refills: 0 | DISCHARGE

## 2024-11-17 RX ORDER — VALSARTAN 320 MG/1
1 TABLET ORAL
Refills: 0 | DISCHARGE

## 2024-11-17 RX ORDER — ACETAMINOPHEN 500MG 500 MG/1
1000 TABLET, COATED ORAL ONCE
Refills: 0 | Status: COMPLETED | OUTPATIENT
Start: 2024-11-17 | End: 2024-11-17

## 2024-11-17 RX ORDER — ROSUVASTATIN CALCIUM 5 MG/1
40 TABLET, FILM COATED ORAL AT BEDTIME
Refills: 0 | Status: DISCONTINUED | OUTPATIENT
Start: 2024-11-17 | End: 2024-11-18

## 2024-11-17 RX ORDER — INFLUENZA VIRUS VACCINE 15; 15; 15; 15 UG/.5ML; UG/.5ML; UG/.5ML; UG/.5ML
0.5 SUSPENSION INTRAMUSCULAR ONCE
Refills: 0 | Status: DISCONTINUED | OUTPATIENT
Start: 2024-11-17 | End: 2024-11-22

## 2024-11-17 RX ORDER — 0.9 % SODIUM CHLORIDE 0.9 %
1000 INTRAVENOUS SOLUTION INTRAVENOUS ONCE
Refills: 0 | Status: COMPLETED | OUTPATIENT
Start: 2024-11-17 | End: 2024-11-17

## 2024-11-17 RX ORDER — VALSARTAN 320 MG/1
320 TABLET ORAL DAILY
Refills: 0 | Status: DISCONTINUED | OUTPATIENT
Start: 2024-11-17 | End: 2024-11-18

## 2024-11-17 RX ORDER — METOPROLOL TARTRATE 100 MG/1
50 TABLET, FILM COATED ORAL DAILY
Refills: 0 | Status: DISCONTINUED | OUTPATIENT
Start: 2024-11-17 | End: 2024-11-18

## 2024-11-17 RX ORDER — ORAL SEMAGLUTIDE 7 MG/1
2 TABLET ORAL
Refills: 0 | DISCHARGE

## 2024-11-17 RX ORDER — ALBUTEROL 90 MCG
2 AEROSOL (GRAM) INHALATION
Refills: 0 | DISCHARGE

## 2024-11-17 RX ORDER — 0.9 % SODIUM CHLORIDE 0.9 %
1000 INTRAVENOUS SOLUTION INTRAVENOUS
Refills: 0 | Status: DISCONTINUED | OUTPATIENT
Start: 2024-11-17 | End: 2024-11-18

## 2024-11-17 RX ORDER — SODIUM CHLORIDE 9 MG/ML
2500 INJECTION, SOLUTION INTRAMUSCULAR; INTRAVENOUS; SUBCUTANEOUS ONCE
Refills: 0 | Status: COMPLETED | OUTPATIENT
Start: 2024-11-17 | End: 2024-11-17

## 2024-11-17 RX ORDER — GLUCAGON INJECTION, SOLUTION 0.5 MG/.1ML
1 INJECTION, SOLUTION SUBCUTANEOUS ONCE
Refills: 0 | Status: DISCONTINUED | OUTPATIENT
Start: 2024-11-17 | End: 2024-11-18

## 2024-11-17 RX ORDER — ALBUTEROL 90 MCG
2.5 AEROSOL (GRAM) INHALATION EVERY 6 HOURS
Refills: 0 | Status: DISCONTINUED | OUTPATIENT
Start: 2024-11-17 | End: 2024-11-18

## 2024-11-17 RX ORDER — INSULIN GLARGINE 100 [IU]/ML
30 INJECTION, SOLUTION SUBCUTANEOUS AT BEDTIME
Refills: 0 | Status: DISCONTINUED | OUTPATIENT
Start: 2024-11-17 | End: 2024-11-18

## 2024-11-17 RX ADMIN — Medication 6: at 16:33

## 2024-11-17 RX ADMIN — ROSUVASTATIN CALCIUM 40 MILLIGRAM(S): 5 TABLET, FILM COATED ORAL at 21:08

## 2024-11-17 RX ADMIN — METOPROLOL TARTRATE 50 MILLIGRAM(S): 100 TABLET, FILM COATED ORAL at 09:06

## 2024-11-17 RX ADMIN — SODIUM CHLORIDE 2500 MILLILITER(S): 9 INJECTION, SOLUTION INTRAMUSCULAR; INTRAVENOUS; SUBCUTANEOUS at 07:00

## 2024-11-17 RX ADMIN — Medication 4: at 11:56

## 2024-11-17 RX ADMIN — SODIUM CHLORIDE 100 MILLILITER(S): 9 INJECTION, SOLUTION INTRAMUSCULAR; INTRAVENOUS; SUBCUTANEOUS at 15:05

## 2024-11-17 RX ADMIN — INSULIN GLARGINE 30 UNIT(S): 100 INJECTION, SOLUTION SUBCUTANEOUS at 21:08

## 2024-11-17 RX ADMIN — PIPERACILLIN SODIUM AND TAZOBACTAM SODIUM 3.38 GRAM(S): 4; .5 INJECTION, POWDER, LYOPHILIZED, FOR SOLUTION INTRAVENOUS at 06:23

## 2024-11-17 RX ADMIN — ACETAMINOPHEN 500MG 400 MILLIGRAM(S): 500 TABLET, COATED ORAL at 14:39

## 2024-11-17 RX ADMIN — Medication 5 UNIT(S): at 09:06

## 2024-11-17 RX ADMIN — PIPERACILLIN SODIUM AND TAZOBACTAM SODIUM 25 GRAM(S): 4; .5 INJECTION, POWDER, LYOPHILIZED, FOR SOLUTION INTRAVENOUS at 21:08

## 2024-11-17 RX ADMIN — Medication 1000 MILLILITER(S): at 15:05

## 2024-11-17 RX ADMIN — ENOXAPARIN SODIUM 40 MILLIGRAM(S): 30 INJECTION SUBCUTANEOUS at 11:33

## 2024-11-17 RX ADMIN — VALSARTAN 320 MILLIGRAM(S): 320 TABLET ORAL at 11:33

## 2024-11-17 RX ADMIN — Medication 2.5 MILLIGRAM(S): at 17:38

## 2024-11-17 RX ADMIN — PIPERACILLIN SODIUM AND TAZOBACTAM SODIUM 25 GRAM(S): 4; .5 INJECTION, POWDER, LYOPHILIZED, FOR SOLUTION INTRAVENOUS at 15:06

## 2024-11-17 RX ADMIN — Medication 5 UNIT(S): at 11:56

## 2024-11-17 RX ADMIN — Medication 5 UNIT(S): at 16:32

## 2024-11-17 RX ADMIN — ACETAMINOPHEN 500MG 1000 MILLIGRAM(S): 500 TABLET, COATED ORAL at 15:10

## 2024-11-17 RX ADMIN — PIPERACILLIN SODIUM AND TAZOBACTAM SODIUM 200 GRAM(S): 4; .5 INJECTION, POWDER, LYOPHILIZED, FOR SOLUTION INTRAVENOUS at 05:53

## 2024-11-17 NOTE — ED ADULT NURSE REASSESSMENT NOTE - NS ED NURSE REASSESS COMMENT FT1
Pt care assumed at 7:30am, A&O X4, received resting comfortably in bed with no signs of acute distress, respiration even and unlabored. Charting as noted.   Awaiting Podiatrist consult. Patient admitted, awaiting bed placement. Safety maintained.

## 2024-11-17 NOTE — H&P ADULT - HISTORY OF PRESENT ILLNESS
54M who presented with continued right foot swelling and fifth toe discoloration. The patient reported that he had first noted a blister to the fifth toe two weeks prior and was evaluated by his primary care physician and podiatrist without further intervention. The patient reported that the blister opened with drainage of fluid. He denied any pain but reported that he had decreased sensation due to neuropathy. He did not have any fevers or chills but noted a malodorous odor from the foot.. He continued to put a dressing on the toe but the discoloration to the toe and swelling to the ankle did not improve. He denied any recent injury to the toe.    PMH: DM, HTN, HLD, Asthma  PSH: Denied  Family History: Parents with diabetes 53M who presented with continued right foot swelling and fifth toe discoloration. The patient reported that he had first noted a blister to the fifth toe two weeks prior and was evaluated by his primary care physician and podiatrist without further intervention. The patient reported that the blister opened with drainage of fluid. He denied any pain but reported that he had decreased sensation due to neuropathy. He did not have any fevers or chills but noted a malodorous odor from the foot.. He continued to put a dressing on the toe but the discoloration to the toe and swelling to the ankle did not improve. He denied any recent injury to the toe and denied any similar findings on the other toes.    PMH: DM, HTN, HLD, Asthma  PSH: Denied  Family History: Parents with diabetes

## 2024-11-17 NOTE — ED PROVIDER NOTE - PHYSICAL EXAMINATION
Gen: No acute distress, non toxic male, speaking in full sentences   HEENT: NCAT, Mucous membranes moist  Eyes: pink conjunctivae, EOMI, PERRL  CV: RRR, nl s1/s2 noted   Resp: CTAB, normal rate and effort  GI: Abdomen soft, NT, ND. No rebound, no guarding  Neuro: A&O x 3, sensorimotor intact without deficits   MSK: No spine or joint tenderness to palpation, Full ROM ext x 4  Skin: (+) Right 5th toe with open ulceration. Pedal pulse intact   Ambulatory in the ED

## 2024-11-17 NOTE — H&P ADULT - ASSESSMENT
54M with a history of hypertension, hyperlipidemia, diabetes, and asthma with neuropathy, who presented with a two week history of right toe discoloration and leg swelling with malodorous odor.    Cellulitis  - Piperacillin/tazobactam initiated  - Blood culture results to be reviewed when available  - Xray of the foot with possible bone erosion to the fifth toe, pending official read  - Podiatry evaluation pending    Diabetes  - Metformin to be held  - Insulin coverage  - Close monitoring of blood glucose levels  - Patient advised to discontinue insulin pump for now pending possible MRI or surgical intervention  - Consistent carbohydrate diet    Hypertension  - Close blood pressure monitoring  - On metoprolol and valsartan    Anemia  - The patient reported a history of anemia in the past for which he was taking B12 and multivitamin  - Monitoring hemoglobin levels    Azotemia  - Mild elevation in creatinine  - No hyperkalemia          Dispo: Anticipate eventual discharge home pending definitive treatment for the toe infection 54M with a history of hypertension, hyperlipidemia, diabetes, and asthma with neuropathy, who presented with a two week history of right toe discoloration and leg swelling with malodorous odor.    Cellulitis  - Piperacillin/tazobactam initiated  - Blood culture results to be reviewed when available  - Xray of the foot with possible bone erosion to the fifth toe, pending official read  - Podiatry evaluation pending    Diabetes  - Metformin to be held  - Insulin coverage  - Close monitoring of blood glucose levels  - Patient advised to discontinue insulin pump for now pending possible MRI or surgical intervention  - Consistent carbohydrate diet    Hypertension  - Close blood pressure monitoring  - On metoprolol and valsartan    Anemia  - The patient reported a history of anemia in the past for which he was taking B12 and multivitamin  - Monitoring hemoglobin levels    Azotemia  - Mild elevation in creatinine  - No hyperkalemia  - Intravenous fluids administered in the emergency department          Dispo: Anticipate eventual discharge home pending definitive treatment for the toe infection 54M with a history of hypertension, hyperlipidemia, diabetes, and asthma with neuropathy, who presented with a two week history of right toe discoloration and leg swelling with malodorous odor.    Toe infection  - ESR and CRP were elevated  - Piperacillin/tazobactam initiated  - Blood culture results to be reviewed when available  - Xray of the foot with possible bone erosion to the fifth toe, pending official read  - Podiatry evaluation pending    Diabetes  - Metformin to be held  - Insulin coverage  - Close monitoring of blood glucose levels  - Patient advised to discontinue insulin pump for now pending possible MRI or surgical intervention  - Consistent carbohydrate diet    Hypertension  - Close blood pressure monitoring  - On metoprolol and valsartan    Anemia  - The patient reported a history of anemia in the past for which he was taking B12 and multivitamin  - Monitoring hemoglobin levels    Azotemia  - Mild elevation in creatinine  - No hyperkalemia  - Intravenous fluids administered in the emergency department          Dispo: Anticipate eventual discharge home pending definitive treatment for the toe infection 54M with a history of hypertension, hyperlipidemia, diabetes, and asthma with neuropathy, who presented with a two week history of right toe discoloration and leg swelling with malodorous odor.    Toe infection  - ESR and CRP were elevated  - Piperacillin/tazobactam initiated  - Blood culture results to be reviewed when available  - Xray of the foot with possible bone erosion to the fifth toe, pending official read  - Podiatry consultation placed, to evaluate the patient today    Diabetes  - Metformin to be held  - Insulin coverage  - Close monitoring of blood glucose levels  - Patient advised to discontinue insulin pump for now pending possible MRI or surgical intervention  - Consistent carbohydrate diet    Hypertension  - Close blood pressure monitoring  - On metoprolol and valsartan    Anemia  - The patient reported a history of anemia in the past for which he was taking B12 and multivitamin  - Monitoring hemoglobin levels    Azotemia  - Mild elevation in creatinine  - No hyperkalemia  - Intravenous fluids administered in the emergency department          Dispo: Anticipate eventual discharge home pending definitive treatment for the toe infection 54M with a history of hypertension, hyperlipidemia, diabetes, and asthma with neuropathy, who presented with a two week history of right toe discoloration and leg swelling with malodorous odor.    Sepsis / Cellulitis  - ESR and CRP were elevated  - Piperacillin/tazobactam initiated  - Blood culture results to be reviewed when available  - Xray of the foot with possible bone erosion to the fifth toe, pending official read  - Discussed with Podiatry, the patient had bedside incision and drainage, may require amputation of the toe pending results of imaging  - The patient was without history of symptoms to suggest ischemic heart disease or decompensated heart failure, no dyspnea on exertion or chest pain. No contraindication to toe amputation from a Medical perspective    Diabetes  - Metformin to be held  - Insulin coverage  - Close monitoring of blood glucose levels  - Patient advised to discontinue insulin pump for now pending possible MRI or surgical intervention  - Consistent carbohydrate diet    Hypertension  - Close blood pressure monitoring  - On metoprolol and valsartan    Anemia  - The patient reported a history of anemia in the past for which he was taking B12 and multivitamin  - Monitoring hemoglobin levels    Azotemia  - Mild elevation in creatinine  - No hyperkalemia  - Intravenous fluids administered in the emergency department          Dispo: Anticipate eventual discharge home pending definitive treatment for the toe infection 54M with a history of hypertension, hyperlipidemia, diabetes, and asthma with neuropathy, who presented with a two week history of right toe discoloration and leg swelling with malodorous odor.    Sepsis / Cellulitis  - ESR and CRP were elevated  - Piperacillin/tazobactam initiated  - Blood culture results to be reviewed when available  - Xray of the foot with possible bone erosion to the fifth toe, pending official read  - Discussed with Podiatry, the patient had bedside incision and drainage, may require amputation of the toe pending results of imaging  - The patient was without history or symptoms to suggest ischemic heart disease or decompensated heart failure, no dyspnea on exertion or chest pain. EKG was without ischemic changes. No contraindication to toe amputation from a Medical perspective  - Wound culture results to be reviewed when available    Diabetes  - Metformin to be held  - Insulin coverage  - Close monitoring of blood glucose levels  - Patient advised to discontinue insulin pump for now pending possible MRI or surgical intervention  - Consistent carbohydrate diet    Hypertension  - Close blood pressure monitoring  - On metoprolol and valsartan    Anemia  - The patient reported a history of anemia in the past for which he was taking B12 and multivitamin  - Monitoring hemoglobin levels    Azotemia  - Mild elevation in creatinine  - No hyperkalemia  - Intravenous fluids administered in the emergency department          Dispo: Anticipate eventual discharge home pending definitive treatment for the toe infection

## 2024-11-17 NOTE — PATIENT PROFILE ADULT - NSPRESCRALCAMT_GEN_A_NUR
Called pt to follow up on hospital discharge from [facility] on 8/7/2021. Unable to LVM as VM is full. [employer] message sent to pt. 1 or 2

## 2024-11-17 NOTE — ED PROVIDER NOTE - ATTENDING APP SHARED VISIT CONTRIBUTION OF CARE
54-year-old male with past medical history diabetes presents for right fifth digit wound with worsening cellulitis streaking up his leg.  Saw vascular and podiatry on 11/8 for the same, was not given any medication but progressively worsening.  Over the last 2 days patient notes chills, nausea and vomiting.    On exam patient has cellulitis and edema on the right foot streaking up the leg with wet gangrene of the right fifth digit, difficult to palpate DP pulse but sensation intact.    Diabetic foot wound with  wet gangrene, Zosyn, labs, x-ray, patient to be admitted to the hospital.

## 2024-11-17 NOTE — ED ADULT TRIAGE NOTE - CHIEF COMPLAINT QUOTE
Patient presents to ED with c/o blister to his right pinky toe.  Per patient, started on 11/1.  Seen podiatry 11/1, PMD 11/4, and vascular 11/8 with no medications prescribed.  Patient with h/o DM type II

## 2024-11-17 NOTE — ED PROVIDER NOTE - CLINICAL SUMMARY MEDICAL DECISION MAKING FREE TEXT BOX
53 y/o male with pmhx DM2 presents to the ED for blister on right pinky toe. He noticed it on 11/1. He went to Vascular, internist and podiatry who did not prescribe him medications. He states it is swollen. Not painful. Upon exam,  right 5th toe with open ulcerations, strong odor. Labs, XR, podiatry 55 y/o male with pmhx DM2 presents to the ED for blister on right pinky toe. He noticed it on 11/1. He went to Vascular, internist and podiatry who did not prescribe him medications. He states it is swollen. Not painful. Upon exam,  right 5th toe with open ulcerations, strong odor, questionable Wet gangrene. Labs, XR, podiatry  -Labs revealing leukocytosis, mild anemia, elevated ESR/CRP.   -XR with bony destruction. Podiatry consult ordered   Admitted to Dr. Surendra Saleem

## 2024-11-17 NOTE — ED ADULT NURSE NOTE - NS ED NOTE  TALK SOMEONE YN
NAME: Trudi Velásquez  DATE: 21  ROOM #: [id number]  CODE STATUS:  DNR-CCA  REASON FOR VISIT: Acute follow up to ER visit. : [birth year]    HPI:  Elderly patient up in her lala chair with eyes closed. Over weekend patient was sent to ER for increased congestion and O2 sats in 70s. By the time she reached the ER her O2 sats were back in the 90s and there were no acute findings. Her code status was again addressed with her POA and was more appropriately changed to [facility]. Patient is without any appearance of distress today     PMHx: Dementia, HLD, Chronic pain, Seizure disorder, Insomnia, recurrent UTIs   PSHx: Tubal ligation, Tonsillectomy   Social Hx: No tobacco or EtOH   FamHx: unknown     PHYSICAL EXAM   Vital Signs:  T, BP, P, RR, Wt  98, 132/64  60, 18, 129#  General Appearance: debilitated appearing female in no acute distress   Head: normocephalic and atraumatic   ENT: external ear canal normal. No lip or gum lesions noted    Cardiovascular: Distal pulses present. S1, S2. Regular rate and rhythm. Pulmonary/Chest:No respiratory distress or retractions. Scattered rhonchi. Abdomen:  PEG present. ABS x 4. Musculoskeletal: No obvious joint swelling or gross deformity   Neuro/Psych: Awake, non verbal. Calm. ASSESSMENT AND PLAN   1. Hypoxia: Noted on pulse ox over weekend with increased secretions and congestion. Code status changed to [facility] and no acute findings were made in ER. She is at her new baseline level of debilitation today. Continue all current orders.      Plan of care reviewed with Dr Fredrick Weiner, CNP No

## 2024-11-17 NOTE — ED PROVIDER NOTE - OBJECTIVE STATEMENT
53 y/o male with pmhx DM2 presents to the ED for blister on right pinky toe. He noticed it on 11/1. He went to Vascular, internist and podiatry who did not prescribe him medications. He states it is swollen. Not painful. He endorses it has an odor, swelling and opening. He has been wearing the compressive socks. Endorses nausea. No numbness/tingling in the foot. No trauma. Seasonal allergies. [facility]. 55 y/o male with pmhx DM2 presents to the ED for blister on right pinky toe. He noticed it on 11/1. He went to Vascular, internist and podiatry who did not prescribe him medications. He states it is swollen. Not painful. He endorses it has an odor, swelling and opening. He has been wearing the compressive socks. Endorses nausea. No numbness/tingling in the foot, no SOB/CP. No trauma. Seasonal allergies. [facility].

## 2024-11-17 NOTE — ED ADULT NURSE REASSESSMENT NOTE - NS ED NURSE REASSESS COMMENT FT1
Patient brought  to CDU. Patient handed off to Rommel GUSMAN in stable condition. Safety maintained.

## 2024-11-17 NOTE — PATIENT PROFILE ADULT - PUBLIC BENEFITS
Pt was called regarding her continued gout pain. She states her diarrhea has resolved, however her foot pain is still present. Pt was told she should avoid taking indomethacin due to CKD. She was advised to take colchicine once daily instead of twice and continue allopurinol. Pt to call back if her symptoms persist or if she has other questions.            no

## 2024-11-17 NOTE — H&P ADULT - NSHPPHYSICALEXAM_GEN_ALL_CORE
Vital Signs Last 24 Hrs  T(C): 36.8 (17 Nov 2024 04:06), Max: 36.8 (17 Nov 2024 04:06)  T(F): 98.3 (17 Nov 2024 04:06), Max: 98.3 (17 Nov 2024 04:06)  HR: 109 (17 Nov 2024 04:06) (109 - 109)  BP: 125/75 (17 Nov 2024 04:06) (125/75 - 125/75)  BP(mean): --  RR: 20 (17 Nov 2024 04:06) (20 - 20)  SpO2: 97% (17 Nov 2024 04:06) (97% - 97%)    Parameters below as of 17 Nov 2024 04:06  Patient On (Oxygen Delivery Method): room air    General appearance: No acute distress, Awake, Alert  HEENT: Normocephalic, Atraumatic, Conjunctiva clear, EOMI  Neck: Supple, No JVD, No tenderness  Lungs: Breath sound equal bilaterally, No wheezes, No rales  Cardiovascular: S1S2, Regular rhythm  Abdomen: Soft, Nontender, Nondistended, No guarding/rebound, Positive bowel sounds  Extremities: No clubbing, No cyanosis, No calf tenderness, Right fifth toe discoloration, Right lower extremity swelling  Neuro: Strength equal bilaterally, No tremors, Decreased sensation to both feet  Psychiatric: Appropriate mood, Normal affect

## 2024-11-17 NOTE — CONSULT NOTE ADULT - SUBJECTIVE AND OBJECTIVE BOX
HPI:  A 54-year-old male with a history of neuropathy and recurrent foot ulcers presents with right foot swelling and fifth toe discoloration. He developed a blister on the fifth toe on November 8th, which subsequently ruptured with fluid drainage. Despite regular dressing changes, the discoloration and ankle swelling persisted. He denies pain, fever, or chills but reports a malodorous odor from the foot. Prior evaluations by his PCP and podiatrist resulted in no intervention. He denied any recent injury to the toe and denied any similar findings on the other toes.    PMH: DM, HTN, HLD, Asthma  PSH: Denied  Family History: Parents with diabetes (17 Nov 2024 07:44)      Podiatry HPI    PMH:  Allergies: No Known Allergies    Medications: acetaminophen     Tablet .. 650 milliGRAM(s) Oral every 6 hours PRN  albuterol    0.083% 2.5 milliGRAM(s) Nebulizer every 6 hours PRN  albuterol    90 MICROgram(s) HFA Inhaler 1 Puff(s) Inhalation every 4 hours PRN  dextrose 5%. 1000 milliLiter(s) IV Continuous <Continuous>  dextrose 5%. 1000 milliLiter(s) IV Continuous <Continuous>  dextrose 50% Injectable 25 Gram(s) IV Push once  dextrose 50% Injectable 12.5 Gram(s) IV Push once  dextrose 50% Injectable 25 Gram(s) IV Push once  dextrose Oral Gel 15 Gram(s) Oral once PRN  enoxaparin Injectable 40 milliGRAM(s) SubCutaneous every 24 hours  glucagon  Injectable 1 milliGRAM(s) IntraMuscular once  influenza   Vaccine 0.5 milliLiter(s) IntraMuscular once  insulin glargine Injectable (LANTUS) 30 Unit(s) SubCutaneous at bedtime  insulin lispro (ADMELOG) corrective regimen sliding scale   SubCutaneous three times a day before meals  insulin lispro Injectable (ADMELOG) 5 Unit(s) SubCutaneous three times a day before meals  metoprolol succinate ER 50 milliGRAM(s) Oral daily  piperacillin/tazobactam IVPB.. 3.375 Gram(s) IV Intermittent every 8 hours  rosuvastatin 40 milliGRAM(s) Oral at bedtime  valsartan 320 milliGRAM(s) Oral daily    FH:  PSX:   SH: acetaminophen     Tablet .. 650 milliGRAM(s) Oral every 6 hours PRN  albuterol    0.083% 2.5 milliGRAM(s) Nebulizer every 6 hours PRN  albuterol    90 MICROgram(s) HFA Inhaler 1 Puff(s) Inhalation every 4 hours PRN  dextrose 5%. 1000 milliLiter(s) IV Continuous <Continuous>  dextrose 5%. 1000 milliLiter(s) IV Continuous <Continuous>  dextrose 50% Injectable 25 Gram(s) IV Push once  dextrose 50% Injectable 12.5 Gram(s) IV Push once  dextrose 50% Injectable 25 Gram(s) IV Push once  dextrose Oral Gel 15 Gram(s) Oral once PRN  enoxaparin Injectable 40 milliGRAM(s) SubCutaneous every 24 hours  glucagon  Injectable 1 milliGRAM(s) IntraMuscular once  influenza   Vaccine 0.5 milliLiter(s) IntraMuscular once  insulin glargine Injectable (LANTUS) 30 Unit(s) SubCutaneous at bedtime  insulin lispro (ADMELOG) corrective regimen sliding scale   SubCutaneous three times a day before meals  insulin lispro Injectable (ADMELOG) 5 Unit(s) SubCutaneous three times a day before meals  metoprolol succinate ER 50 milliGRAM(s) Oral daily  piperacillin/tazobactam IVPB.. 3.375 Gram(s) IV Intermittent every 8 hours  rosuvastatin 40 milliGRAM(s) Oral at bedtime  valsartan 320 milliGRAM(s) Oral daily      Vital Signs Last 24 Hrs  T(C): 36.8 (17 Nov 2024 08:33), Max: 37.4 (17 Nov 2024 08:25)  T(F): 98.3 (17 Nov 2024 08:33), Max: 99.3 (17 Nov 2024 08:25)  HR: 94 (17 Nov 2024 08:33) (94 - 109)  BP: 152/88 (17 Nov 2024 08:33) (125/75 - 152/88)  BP(mean): --  RR: 18 (17 Nov 2024 08:33) (18 - 20)  SpO2: 98% (17 Nov 2024 08:33) (95% - 98%)    Parameters below as of 17 Nov 2024 04:06  Patient On (Oxygen Delivery Method): room air        LABS                        11.9   13.22 )-----------( 410      ( 17 Nov 2024 05:30 )             34.6               11-17    137  |  96  |  15.2  ----------------------------<  86  3.6   |  22.0  |  1.36[H]    Ca    9.5      17 Nov 2024 05:30    TPro  8.5  /  Alb  4.0  /  TBili  0.4  /  DBili  x   /  AST  26  /  ALT  28  /  AlkPhos  137[H]  11-17      ROS  REVIEW OF SYSTEMS:    CONSTITUTIONAL: No fever, weight loss, or fatigue  EYES: No eye pain, visual disturbances, or discharge  ENMT:  No difficulty hearing, tinnitus, vertigo; No sinus or throat pain  NECK: No pain or stiffness  BREASTS: No pain, masses, or nipple discharge  RESPIRATORY: No cough, wheezing, chills or hemoptysis; No shortness of breath  CARDIOVASCULAR: No chest pain, palpitations, dizziness, or leg swelling  GASTROINTESTINAL: No abdominal or epigastric pain. No nausea, vomiting, or hematemesis; No diarrhea or constipation. No melena or hematochezia.  GENITOURINARY: No dysuria, frequency, hematuria, or incontinence  NEUROLOGICAL: No headaches, memory loss, loss of strength, numbness, or tremors  SKIN: No itching, burning, rashes, or lesions   LYMPH NODES: No enlarged glands  ENDOCRINE: No heat or cold intolerance; No hair loss  MUSCULOSKELETAL: No joint pain or swelling; No muscle, back, or extremity pain  PSYCHIATRIC: No depression, anxiety, mood swings, or difficulty sleeping  HEME/LYMPH: No easy bruising, or bleeding gums  ALLERY AND IMMUNOLOGIC: No hives or eczema      PHYSICAL EXAM  LE Focused:    Vasc:  DP & PT non-palpable b/l. CFT<3 seconds to all 10 digits b/l. TG warm to warm b/l  Derm: Right foot 5th digit wound. Necrotic and fluctuant. Wound probes to bone. Soft tissue crepitus present, increased malodor, 5cc of seropurulent drainage noted upon expression of the wound. Erythema noted spanning from R 5th digit to ankle.   Neuro: Gross and light touch sensation diminshed b/l  MSK: No pain on palpation to foot. Able to move remaining digits.       IMAGING: ?xray    CULTURES:     A:  R foot necrotic 5th digit    P:   Patient seen and evaluated at bedside and Chart reviewed.  leukocytosis present with vitals stable  Discussed diagnosis and treatment with patient  X-rays evaluated-possible soft tissue emphysema along R 5th ray. Pending final read.  Pending CT of R[facility]   Bedside incision and drainage performed of left foot after obtaining verbal consent. Longitudinal Incision was made over the dorsal R 5th met midshaft. About 5cc of purulent drainage noted after manual expression. Flushed wound with copious amount of betadine saline mix.   Wound Cx obtained and sent to lab  Dressed with betadine soaked gauge and DSD.  Patient to keep the dressing clean, dry and intact  Recommended MRI  foot with/without contrast to rule out Osteomyelitis with abscess   Recommended ID consult and broad spectrum IV antibiotics  Recommended Vasc consult if ELLIE/PVR abnormal for healing potential of wound.   Ordered ELLIE/TBI/PVR   Reviewed ESR/CRP  Discussed importance of daily foot examinations and proper shoe gear and to importance of lower Fasting Blood Glucose levels.   Podiatry to follow while in house.  Discussed with Dr. Vidal         HPI:  A 54-year-old male with a history of neuropathy and recurrent foot ulcers presents with right foot swelling and fifth toe discoloration. He developed a blister on the fifth toe on November 8th, which subsequently ruptured with fluid drainage. Despite regular dressing changes, the discoloration and ankle swelling persisted. He denies pain, fever, or chills but reports a malodorous odor from the foot. Prior evaluations by his PCP and podiatrist resulted in no intervention. He denied any recent injury to the toe and denied any similar findings on the other toes.    PMH: DM, HTN, HLD, Asthma  PSH: Denied  Family History: Parents with diabetes (17 Nov 2024 07:44)      Podiatry HPI    PMH:  Allergies: No Known Allergies    Medications: acetaminophen     Tablet .. 650 milliGRAM(s) Oral every 6 hours PRN  albuterol    0.083% 2.5 milliGRAM(s) Nebulizer every 6 hours PRN  albuterol    90 MICROgram(s) HFA Inhaler 1 Puff(s) Inhalation every 4 hours PRN  dextrose 5%. 1000 milliLiter(s) IV Continuous <Continuous>  dextrose 5%. 1000 milliLiter(s) IV Continuous <Continuous>  dextrose 50% Injectable 25 Gram(s) IV Push once  dextrose 50% Injectable 12.5 Gram(s) IV Push once  dextrose 50% Injectable 25 Gram(s) IV Push once  dextrose Oral Gel 15 Gram(s) Oral once PRN  enoxaparin Injectable 40 milliGRAM(s) SubCutaneous every 24 hours  glucagon  Injectable 1 milliGRAM(s) IntraMuscular once  influenza   Vaccine 0.5 milliLiter(s) IntraMuscular once  insulin glargine Injectable (LANTUS) 30 Unit(s) SubCutaneous at bedtime  insulin lispro (ADMELOG) corrective regimen sliding scale   SubCutaneous three times a day before meals  insulin lispro Injectable (ADMELOG) 5 Unit(s) SubCutaneous three times a day before meals  metoprolol succinate ER 50 milliGRAM(s) Oral daily  piperacillin/tazobactam IVPB.. 3.375 Gram(s) IV Intermittent every 8 hours  rosuvastatin 40 milliGRAM(s) Oral at bedtime  valsartan 320 milliGRAM(s) Oral daily    FH:  PSX:   SH: acetaminophen     Tablet .. 650 milliGRAM(s) Oral every 6 hours PRN  albuterol    0.083% 2.5 milliGRAM(s) Nebulizer every 6 hours PRN  albuterol    90 MICROgram(s) HFA Inhaler 1 Puff(s) Inhalation every 4 hours PRN  dextrose 5%. 1000 milliLiter(s) IV Continuous <Continuous>  dextrose 5%. 1000 milliLiter(s) IV Continuous <Continuous>  dextrose 50% Injectable 25 Gram(s) IV Push once  dextrose 50% Injectable 12.5 Gram(s) IV Push once  dextrose 50% Injectable 25 Gram(s) IV Push once  dextrose Oral Gel 15 Gram(s) Oral once PRN  enoxaparin Injectable 40 milliGRAM(s) SubCutaneous every 24 hours  glucagon  Injectable 1 milliGRAM(s) IntraMuscular once  influenza   Vaccine 0.5 milliLiter(s) IntraMuscular once  insulin glargine Injectable (LANTUS) 30 Unit(s) SubCutaneous at bedtime  insulin lispro (ADMELOG) corrective regimen sliding scale   SubCutaneous three times a day before meals  insulin lispro Injectable (ADMELOG) 5 Unit(s) SubCutaneous three times a day before meals  metoprolol succinate ER 50 milliGRAM(s) Oral daily  piperacillin/tazobactam IVPB.. 3.375 Gram(s) IV Intermittent every 8 hours  rosuvastatin 40 milliGRAM(s) Oral at bedtime  valsartan 320 milliGRAM(s) Oral daily      Vital Signs Last 24 Hrs  T(C): 36.8 (17 Nov 2024 08:33), Max: 37.4 (17 Nov 2024 08:25)  T(F): 98.3 (17 Nov 2024 08:33), Max: 99.3 (17 Nov 2024 08:25)  HR: 94 (17 Nov 2024 08:33) (94 - 109)  BP: 152/88 (17 Nov 2024 08:33) (125/75 - 152/88)  BP(mean): --  RR: 18 (17 Nov 2024 08:33) (18 - 20)  SpO2: 98% (17 Nov 2024 08:33) (95% - 98%)    Parameters below as of 17 Nov 2024 04:06  Patient On (Oxygen Delivery Method): room air        LABS                        11.9   13.22 )-----------( 410      ( 17 Nov 2024 05:30 )             34.6               11-17    137  |  96  |  15.2  ----------------------------<  86  3.6   |  22.0  |  1.36[H]    Ca    9.5      17 Nov 2024 05:30    TPro  8.5  /  Alb  4.0  /  TBili  0.4  /  DBili  x   /  AST  26  /  ALT  28  /  AlkPhos  137[H]  11-17      ROS  REVIEW OF SYSTEMS:    CONSTITUTIONAL: No fever, weight loss, or fatigue  EYES: No eye pain, visual disturbances, or discharge  ENMT:  No difficulty hearing, tinnitus, vertigo; No sinus or throat pain  NECK: No pain or stiffness  BREASTS: No pain, masses, or nipple discharge  RESPIRATORY: No cough, wheezing, chills or hemoptysis; No shortness of breath  CARDIOVASCULAR: No chest pain, palpitations, dizziness, or leg swelling  GASTROINTESTINAL: No abdominal or epigastric pain. No nausea, vomiting, or hematemesis; No diarrhea or constipation. No melena or hematochezia.  GENITOURINARY: No dysuria, frequency, hematuria, or incontinence  NEUROLOGICAL: No headaches, memory loss, loss of strength, numbness, or tremors  SKIN: No itching, burning, rashes, or lesions   LYMPH NODES: No enlarged glands  ENDOCRINE: No heat or cold intolerance; No hair loss  MUSCULOSKELETAL: No joint pain or swelling; No muscle, back, or extremity pain  PSYCHIATRIC: No depression, anxiety, mood swings, or difficulty sleeping  HEME/LYMPH: No easy bruising, or bleeding gums  ALLERY AND IMMUNOLOGIC: No hives or eczema      PHYSICAL EXAM  LE Focused:    Vasc:  DP & PT non-palpable b/l. CFT<3 seconds to all 10 digits b/l. TG warm to warm b/l  Derm: Right foot 5th digit wound. Necrotic and fluctuant. Wound probes to bone. Soft tissue crepitus present, increased malodor, 5cc of seropurulent drainage noted upon expression of the wound. Erythema noted spanning from R 5th digit to ankle.   Neuro: Gross and light touch sensation diminshed b/l  MSK: No pain on palpation to foot. Able to move remaining digits.       IMAGING: R foot XR   Suspect pathologic fracture of the fifth proximal phalanx.  Second and third metatarsal bone diaphyseal periostitis.    CULTURES:     A:  R foot necrotic 5th digit    P:   Patient seen and evaluated at bedside and Chart reviewed.  leukocytosis present with vitals stable  Discussed diagnosis and treatment with patient  X-rays evaluated-Suspect pathologic fracture of the fifth proximal phalanx.  Second and third metatarsal bone diaphyseal periostitis.  Pending CT of RLE   Bedside incision and drainage performed of left foot after obtaining verbal consent. Longitudinal Incision was made over the dorsal R 5th met midshaft. About 5cc of purulent drainage noted after manual expression. Flushed wound with copious amount of betadine saline mix.   Wound Cx obtained and sent to lab  Dressed with betadine soaked gauge and DSD.  Patient to keep the dressing clean, dry and intact  Recommended MRI  foot with/without contrast to rule out Osteomyelitis with abscess   Recommended ID consult and broad spectrum IV antibiotics  Recommended Vasc consult if ELLIE/PVR abnormal for healing potential of wound.   Ordered ELLIE/TBI/PVR   Reviewed ESR/CRP  Discussed importance of daily foot examinations and proper shoe gear and to importance of lower Fasting Blood Glucose levels.   Podiatry to follow while in house.  Discussed with Dr. Vidal         HPI:  A 54-year-old male with a history of neuropathy and recurrent foot ulcers presents with right foot swelling and fifth toe discoloration. He developed a blister on the fifth toe on November 8th, which subsequently ruptured with fluid drainage. Despite regular dressing changes, the discoloration and ankle swelling persisted. He denies pain, fever, or chills but reports a malodorous odor from the foot. Prior evaluations by his PCP and podiatrist resulted in no intervention. He denied any recent injury to the toe and denied any similar findings on the other toes.    PMH: DM, HTN, HLD, Asthma  PSH: Denied  Family History: Parents with diabetes (17 Nov 2024 07:44)      Podiatry HPI    PMH:  Allergies: No Known Allergies    Medications: acetaminophen     Tablet .. 650 milliGRAM(s) Oral every 6 hours PRN  albuterol    0.083% 2.5 milliGRAM(s) Nebulizer every 6 hours PRN  albuterol    90 MICROgram(s) HFA Inhaler 1 Puff(s) Inhalation every 4 hours PRN  dextrose 5%. 1000 milliLiter(s) IV Continuous <Continuous>  dextrose 5%. 1000 milliLiter(s) IV Continuous <Continuous>  dextrose 50% Injectable 25 Gram(s) IV Push once  dextrose 50% Injectable 12.5 Gram(s) IV Push once  dextrose 50% Injectable 25 Gram(s) IV Push once  dextrose Oral Gel 15 Gram(s) Oral once PRN  enoxaparin Injectable 40 milliGRAM(s) SubCutaneous every 24 hours  glucagon  Injectable 1 milliGRAM(s) IntraMuscular once  influenza   Vaccine 0.5 milliLiter(s) IntraMuscular once  insulin glargine Injectable (LANTUS) 30 Unit(s) SubCutaneous at bedtime  insulin lispro (ADMELOG) corrective regimen sliding scale   SubCutaneous three times a day before meals  insulin lispro Injectable (ADMELOG) 5 Unit(s) SubCutaneous three times a day before meals  metoprolol succinate ER 50 milliGRAM(s) Oral daily  piperacillin/tazobactam IVPB.. 3.375 Gram(s) IV Intermittent every 8 hours  rosuvastatin 40 milliGRAM(s) Oral at bedtime  valsartan 320 milliGRAM(s) Oral daily    FH:  PSX:   SH: acetaminophen     Tablet .. 650 milliGRAM(s) Oral every 6 hours PRN  albuterol    0.083% 2.5 milliGRAM(s) Nebulizer every 6 hours PRN  albuterol    90 MICROgram(s) HFA Inhaler 1 Puff(s) Inhalation every 4 hours PRN  dextrose 5%. 1000 milliLiter(s) IV Continuous <Continuous>  dextrose 5%. 1000 milliLiter(s) IV Continuous <Continuous>  dextrose 50% Injectable 25 Gram(s) IV Push once  dextrose 50% Injectable 12.5 Gram(s) IV Push once  dextrose 50% Injectable 25 Gram(s) IV Push once  dextrose Oral Gel 15 Gram(s) Oral once PRN  enoxaparin Injectable 40 milliGRAM(s) SubCutaneous every 24 hours  glucagon  Injectable 1 milliGRAM(s) IntraMuscular once  influenza   Vaccine 0.5 milliLiter(s) IntraMuscular once  insulin glargine Injectable (LANTUS) 30 Unit(s) SubCutaneous at bedtime  insulin lispro (ADMELOG) corrective regimen sliding scale   SubCutaneous three times a day before meals  insulin lispro Injectable (ADMELOG) 5 Unit(s) SubCutaneous three times a day before meals  metoprolol succinate ER 50 milliGRAM(s) Oral daily  piperacillin/tazobactam IVPB.. 3.375 Gram(s) IV Intermittent every 8 hours  rosuvastatin 40 milliGRAM(s) Oral at bedtime  valsartan 320 milliGRAM(s) Oral daily      Vital Signs Last 24 Hrs  T(C): 36.8 (17 Nov 2024 08:33), Max: 37.4 (17 Nov 2024 08:25)  T(F): 98.3 (17 Nov 2024 08:33), Max: 99.3 (17 Nov 2024 08:25)  HR: 94 (17 Nov 2024 08:33) (94 - 109)  BP: 152/88 (17 Nov 2024 08:33) (125/75 - 152/88)  BP(mean): --  RR: 18 (17 Nov 2024 08:33) (18 - 20)  SpO2: 98% (17 Nov 2024 08:33) (95% - 98%)    Parameters below as of 17 Nov 2024 04:06  Patient On (Oxygen Delivery Method): room air        LABS                        11.9   13.22 )-----------( 410      ( 17 Nov 2024 05:30 )             34.6               11-17    137  |  96  |  15.2  ----------------------------<  86  3.6   |  22.0  |  1.36[H]    Ca    9.5      17 Nov 2024 05:30    TPro  8.5  /  Alb  4.0  /  TBili  0.4  /  DBili  x   /  AST  26  /  ALT  28  /  AlkPhos  137[H]  11-17      ROS  REVIEW OF SYSTEMS:    CONSTITUTIONAL: No fever, weight loss, or fatigue  EYES: No eye pain, visual disturbances, or discharge  ENMT:  No difficulty hearing, tinnitus, vertigo; No sinus or throat pain  NECK: No pain or stiffness  BREASTS: No pain, masses, or nipple discharge  RESPIRATORY: No cough, wheezing, chills or hemoptysis; No shortness of breath  CARDIOVASCULAR: No chest pain, palpitations, dizziness, or leg swelling  GASTROINTESTINAL: No abdominal or epigastric pain. No nausea, vomiting, or hematemesis; No diarrhea or constipation. No melena or hematochezia.  GENITOURINARY: No dysuria, frequency, hematuria, or incontinence  NEUROLOGICAL: No headaches, memory loss, loss of strength, numbness, or tremors  SKIN: No itching, burning, rashes, or lesions   LYMPH NODES: No enlarged glands  ENDOCRINE: No heat or cold intolerance; No hair loss  MUSCULOSKELETAL: No joint pain or swelling; No muscle, back, or extremity pain  PSYCHIATRIC: No depression, anxiety, mood swings, or difficulty sleeping  HEME/LYMPH: No easy bruising, or bleeding gums  ALLERY AND IMMUNOLOGIC: No hives or eczema      PHYSICAL EXAM  LE Focused:    Vasc:  DP & PT non-palpable b/l. CFT<3 seconds to all 10 digits b/l. TG warm to warm b/l  Derm: Right foot 5th digit wound. Necrotic and fluctuant. Wound probes to bone. Soft tissue crepitus present, increased malodor, 5cc of seropurulent drainage noted upon expression of the wound. Erythema noted spanning from R 5th digit to ankle.   Neuro: Gross and light touch sensation diminshed b/l  MSK: No pain on palpation to foot. Able to move remaining digits.       IMAGING: R foot XR   Suspect pathologic fracture of the fifth proximal phalanx.  Second and third metatarsal bone diaphyseal periostitis.    CULTURES:     A:  R foot necrotic 5th digit    P:   Patient seen and evaluated at bedside and Chart reviewed.  leukocytosis present with vitals stable  Discussed diagnosis and treatment with patient  X-rays evaluated-Suspect pathologic fracture of the fifth proximal phalanx.  Second and third metatarsal bone diaphyseal periostitis.  Pending CT of R[facility]   Bedside incision and drainage performed of left foot after obtaining verbal consent. Longitudinal Incision was made over the dorsal R 5th met midshaft. About 5cc of purulent drainage noted after manual expression. Flushed wound with copious amount of betadine saline mix.   Wound Cx obtained and sent to lab  Planning for R 5th digit amputation tomorrow as an ADD-ON  NPO order and Type and screen order placed  Requesting medical clearance  All risks and benefits of the procedure was explained to the patient in detail. Patient is amendable to the surgery. Answered all questions to patients satisfaction   Written consent signed and witness present this morning   Dressed with betadine soaked gauge and DSD.  Patient to keep the dressing clean, dry and intact  Recommended MRI  foot with/without contrast to rule out Osteomyelitis with abscess   Recommended ID consult and broad spectrum IV antibiotics  Recommended Vasc consult if ELLIE/PVR abnormal for healing potential of wound.   Ordered ELLIE/TBI/PVR   Reviewed ESR/CRP  Discussed importance of daily foot examinations and proper shoe gear and to importance of lower Fasting Blood Glucose levels.   Podiatry to follow while in house.  Discussed with Dr. Vidal         HPI:  A 54-year-old male with a history of neuropathy and recurrent foot ulcers presents with right foot swelling and fifth toe discoloration. He developed a blister on the fifth toe on November 8th, which subsequently ruptured with fluid drainage. Despite regular dressing changes, the discoloration and ankle swelling persisted. He denies pain, fever, or chills but reports a malodorous odor from the foot. Prior evaluations by his PCP and podiatrist resulted in no intervention. He denied any recent injury to the toe and denied any similar findings on the other toes.    PMH: DM, HTN, HLD, Asthma  PSH: Denied  Family History: Parents with diabetes (17 Nov 2024 07:44)      Podiatry HPI    PMH:  Allergies: No Known Allergies    Medications: acetaminophen     Tablet .. 650 milliGRAM(s) Oral every 6 hours PRN  albuterol    0.083% 2.5 milliGRAM(s) Nebulizer every 6 hours PRN  albuterol    90 MICROgram(s) HFA Inhaler 1 Puff(s) Inhalation every 4 hours PRN  dextrose 5%. 1000 milliLiter(s) IV Continuous <Continuous>  dextrose 5%. 1000 milliLiter(s) IV Continuous <Continuous>  dextrose 50% Injectable 25 Gram(s) IV Push once  dextrose 50% Injectable 12.5 Gram(s) IV Push once  dextrose 50% Injectable 25 Gram(s) IV Push once  dextrose Oral Gel 15 Gram(s) Oral once PRN  enoxaparin Injectable 40 milliGRAM(s) SubCutaneous every 24 hours  glucagon  Injectable 1 milliGRAM(s) IntraMuscular once  influenza   Vaccine 0.5 milliLiter(s) IntraMuscular once  insulin glargine Injectable (LANTUS) 30 Unit(s) SubCutaneous at bedtime  insulin lispro (ADMELOG) corrective regimen sliding scale   SubCutaneous three times a day before meals  insulin lispro Injectable (ADMELOG) 5 Unit(s) SubCutaneous three times a day before meals  metoprolol succinate ER 50 milliGRAM(s) Oral daily  piperacillin/tazobactam IVPB.. 3.375 Gram(s) IV Intermittent every 8 hours  rosuvastatin 40 milliGRAM(s) Oral at bedtime  valsartan 320 milliGRAM(s) Oral daily    FH:  PSX:   SH: acetaminophen     Tablet .. 650 milliGRAM(s) Oral every 6 hours PRN  albuterol    0.083% 2.5 milliGRAM(s) Nebulizer every 6 hours PRN  albuterol    90 MICROgram(s) HFA Inhaler 1 Puff(s) Inhalation every 4 hours PRN  dextrose 5%. 1000 milliLiter(s) IV Continuous <Continuous>  dextrose 5%. 1000 milliLiter(s) IV Continuous <Continuous>  dextrose 50% Injectable 25 Gram(s) IV Push once  dextrose 50% Injectable 12.5 Gram(s) IV Push once  dextrose 50% Injectable 25 Gram(s) IV Push once  dextrose Oral Gel 15 Gram(s) Oral once PRN  enoxaparin Injectable 40 milliGRAM(s) SubCutaneous every 24 hours  glucagon  Injectable 1 milliGRAM(s) IntraMuscular once  influenza   Vaccine 0.5 milliLiter(s) IntraMuscular once  insulin glargine Injectable (LANTUS) 30 Unit(s) SubCutaneous at bedtime  insulin lispro (ADMELOG) corrective regimen sliding scale   SubCutaneous three times a day before meals  insulin lispro Injectable (ADMELOG) 5 Unit(s) SubCutaneous three times a day before meals  metoprolol succinate ER 50 milliGRAM(s) Oral daily  piperacillin/tazobactam IVPB.. 3.375 Gram(s) IV Intermittent every 8 hours  rosuvastatin 40 milliGRAM(s) Oral at bedtime  valsartan 320 milliGRAM(s) Oral daily      Vital Signs Last 24 Hrs  T(C): 36.8 (17 Nov 2024 08:33), Max: 37.4 (17 Nov 2024 08:25)  T(F): 98.3 (17 Nov 2024 08:33), Max: 99.3 (17 Nov 2024 08:25)  HR: 94 (17 Nov 2024 08:33) (94 - 109)  BP: 152/88 (17 Nov 2024 08:33) (125/75 - 152/88)  BP(mean): --  RR: 18 (17 Nov 2024 08:33) (18 - 20)  SpO2: 98% (17 Nov 2024 08:33) (95% - 98%)    Parameters below as of 17 Nov 2024 04:06  Patient On (Oxygen Delivery Method): room air        LABS                        11.9   13.22 )-----------( 410      ( 17 Nov 2024 05:30 )             34.6               11-17    137  |  96  |  15.2  ----------------------------<  86  3.6   |  22.0  |  1.36[H]    Ca    9.5      17 Nov 2024 05:30    TPro  8.5  /  Alb  4.0  /  TBili  0.4  /  DBili  x   /  AST  26  /  ALT  28  /  AlkPhos  137[H]  11-17      ROS  REVIEW OF SYSTEMS:    CONSTITUTIONAL: No fever, weight loss, or fatigue  EYES: No eye pain, visual disturbances, or discharge  ENMT:  No difficulty hearing, tinnitus, vertigo; No sinus or throat pain  NECK: No pain or stiffness  BREASTS: No pain, masses, or nipple discharge  RESPIRATORY: No cough, wheezing, chills or hemoptysis; No shortness of breath  CARDIOVASCULAR: No chest pain, palpitations, dizziness, or leg swelling  GASTROINTESTINAL: No abdominal or epigastric pain. No nausea, vomiting, or hematemesis; No diarrhea or constipation. No melena or hematochezia.  GENITOURINARY: No dysuria, frequency, hematuria, or incontinence  NEUROLOGICAL: No headaches, memory loss, loss of strength, numbness, or tremors  SKIN: No itching, burning, rashes, or lesions   LYMPH NODES: No enlarged glands  ENDOCRINE: No heat or cold intolerance; No hair loss  MUSCULOSKELETAL: No joint pain or swelling; No muscle, back, or extremity pain  PSYCHIATRIC: No depression, anxiety, mood swings, or difficulty sleeping  HEME/LYMPH: No easy bruising, or bleeding gums  ALLERY AND IMMUNOLOGIC: No hives or eczema      PHYSICAL EXAM  LE Focused:    Vasc:  DP & PT non-palpable b/l. CFT<3 seconds to all 10 digits b/l. TG warm to warm b/l  Derm: Right foot 5th digit wound. Necrotic and fluctuant. Wound probes to bone. Soft tissue crepitus present, increased malodor, 5cc of seropurulent drainage noted upon expression of the wound. Erythema noted spanning from R 5th digit to ankle.   Neuro: Gross and light touch sensation diminshed b/l  MSK: No pain on palpation to foot. Able to move remaining digits.       IMAGING: R foot XR   Suspect pathologic fracture of the fifth proximal phalanx.  Second and third metatarsal bone diaphyseal periostitis.    CULTURES:     A:  R foot necrotic 5th digit    P:   Patient seen and evaluated at bedside and Chart reviewed.  leukocytosis present with vitals stable  Discussed diagnosis and treatment with patient  X-rays evaluated-Suspect pathologic fracture of the fifth proximal phalanx.  Second and third metatarsal bone diaphyseal periostitis.  Pending CT of RLE   Bedside incision and drainage performed of left foot after obtaining verbal consent. Longitudinal Incision was made over the dorsal R 5th met midshaft. About 5cc of purulent drainage noted after manual expression. Flushed wound with copious amount of betadine saline mix.   Wound Cx obtained and sent to lab  Planning for R 5th digit amputation tomorrow as an ADD-ON  NPO order and Type and screen order placed  Requesting medical clearance   Dressed with betadine soaked gauge and DSD.  Patient to keep the dressing clean, dry and intact  Recommended MRI  foot with/without contrast to rule out Osteomyelitis with abscess   Recommended ID consult and broad spectrum IV antibiotics  Recommended Vasc consult if ELLIE/PVR abnormal for healing potential of wound.   Ordered ELLIE/TBI/PVR   Reviewed ESR/CRP  Discussed importance of daily foot examinations and proper shoe gear and to importance of lower Fasting Blood Glucose levels.   Podiatry to follow while in house.  Discussed with Dr. Vidal         HPI:  A 54-year-old male with a history of neuropathy and recurrent foot ulcers presents with right foot swelling and fifth toe discoloration. He developed a blister on the fifth toe on November 8th, which subsequently ruptured with fluid drainage. Despite regular dressing changes, the discoloration and ankle swelling persisted. He denies pain, fever, or chills but reports a malodorous odor from the foot. Prior evaluations by his PCP and podiatrist resulted in no intervention. He denied any recent injury to the toe and denied any similar findings on the other toes.    PMH: DM, HTN, HLD, Asthma  PSH: Denied  Family History: Parents with diabetes (17 Nov 2024 07:44)      Podiatry HPI    PMH:  Allergies: No Known Allergies    Medications: acetaminophen     Tablet .. 650 milliGRAM(s) Oral every 6 hours PRN  albuterol    0.083% 2.5 milliGRAM(s) Nebulizer every 6 hours PRN  albuterol    90 MICROgram(s) HFA Inhaler 1 Puff(s) Inhalation every 4 hours PRN  dextrose 5%. 1000 milliLiter(s) IV Continuous <Continuous>  dextrose 5%. 1000 milliLiter(s) IV Continuous <Continuous>  dextrose 50% Injectable 25 Gram(s) IV Push once  dextrose 50% Injectable 12.5 Gram(s) IV Push once  dextrose 50% Injectable 25 Gram(s) IV Push once  dextrose Oral Gel 15 Gram(s) Oral once PRN  enoxaparin Injectable 40 milliGRAM(s) SubCutaneous every 24 hours  glucagon  Injectable 1 milliGRAM(s) IntraMuscular once  influenza   Vaccine 0.5 milliLiter(s) IntraMuscular once  insulin glargine Injectable (LANTUS) 30 Unit(s) SubCutaneous at bedtime  insulin lispro (ADMELOG) corrective regimen sliding scale   SubCutaneous three times a day before meals  insulin lispro Injectable (ADMELOG) 5 Unit(s) SubCutaneous three times a day before meals  metoprolol succinate ER 50 milliGRAM(s) Oral daily  piperacillin/tazobactam IVPB.. 3.375 Gram(s) IV Intermittent every 8 hours  rosuvastatin 40 milliGRAM(s) Oral at bedtime  valsartan 320 milliGRAM(s) Oral daily    FH:  PSX:   SH: acetaminophen     Tablet .. 650 milliGRAM(s) Oral every 6 hours PRN  albuterol    0.083% 2.5 milliGRAM(s) Nebulizer every 6 hours PRN  albuterol    90 MICROgram(s) HFA Inhaler 1 Puff(s) Inhalation every 4 hours PRN  dextrose 5%. 1000 milliLiter(s) IV Continuous <Continuous>  dextrose 5%. 1000 milliLiter(s) IV Continuous <Continuous>  dextrose 50% Injectable 25 Gram(s) IV Push once  dextrose 50% Injectable 12.5 Gram(s) IV Push once  dextrose 50% Injectable 25 Gram(s) IV Push once  dextrose Oral Gel 15 Gram(s) Oral once PRN  enoxaparin Injectable 40 milliGRAM(s) SubCutaneous every 24 hours  glucagon  Injectable 1 milliGRAM(s) IntraMuscular once  influenza   Vaccine 0.5 milliLiter(s) IntraMuscular once  insulin glargine Injectable (LANTUS) 30 Unit(s) SubCutaneous at bedtime  insulin lispro (ADMELOG) corrective regimen sliding scale   SubCutaneous three times a day before meals  insulin lispro Injectable (ADMELOG) 5 Unit(s) SubCutaneous three times a day before meals  metoprolol succinate ER 50 milliGRAM(s) Oral daily  piperacillin/tazobactam IVPB.. 3.375 Gram(s) IV Intermittent every 8 hours  rosuvastatin 40 milliGRAM(s) Oral at bedtime  valsartan 320 milliGRAM(s) Oral daily      Vital Signs Last 24 Hrs  T(C): 36.8 (17 Nov 2024 08:33), Max: 37.4 (17 Nov 2024 08:25)  T(F): 98.3 (17 Nov 2024 08:33), Max: 99.3 (17 Nov 2024 08:25)  HR: 94 (17 Nov 2024 08:33) (94 - 109)  BP: 152/88 (17 Nov 2024 08:33) (125/75 - 152/88)  BP(mean): --  RR: 18 (17 Nov 2024 08:33) (18 - 20)  SpO2: 98% (17 Nov 2024 08:33) (95% - 98%)    Parameters below as of 17 Nov 2024 04:06  Patient On (Oxygen Delivery Method): room air        LABS                        11.9   13.22 )-----------( 410      ( 17 Nov 2024 05:30 )             34.6               11-17    137  |  96  |  15.2  ----------------------------<  86  3.6   |  22.0  |  1.36[H]    Ca    9.5      17 Nov 2024 05:30    TPro  8.5  /  Alb  4.0  /  TBili  0.4  /  DBili  x   /  AST  26  /  ALT  28  /  AlkPhos  137[H]  11-17      ROS  REVIEW OF SYSTEMS:    CONSTITUTIONAL: No fever, weight loss, or fatigue  EYES: No eye pain, visual disturbances, or discharge  ENMT:  No difficulty hearing, tinnitus, vertigo; No sinus or throat pain  NECK: No pain or stiffness  BREASTS: No pain, masses, or nipple discharge  RESPIRATORY: No cough, wheezing, chills or hemoptysis; No shortness of breath  CARDIOVASCULAR: No chest pain, palpitations, dizziness, or leg swelling  GASTROINTESTINAL: No abdominal or epigastric pain. No nausea, vomiting, or hematemesis; No diarrhea or constipation. No melena or hematochezia.  GENITOURINARY: No dysuria, frequency, hematuria, or incontinence  NEUROLOGICAL: No headaches, memory loss, loss of strength, numbness, or tremors  SKIN: No itching, burning, rashes, or lesions   LYMPH NODES: No enlarged glands  ENDOCRINE: No heat or cold intolerance; No hair loss  MUSCULOSKELETAL: No joint pain or swelling; No muscle, back, or extremity pain  PSYCHIATRIC: No depression, anxiety, mood swings, or difficulty sleeping  HEME/LYMPH: No easy bruising, or bleeding gums  ALLERY AND IMMUNOLOGIC: No hives or eczema      PHYSICAL EXAM  LE Focused:    Vasc:  DP & PT non-palpable b/l. CFT<3 seconds to all 10 digits b/l. TG warm to warm b/l  Derm: Right foot 5th digit wound. Necrotic and fluctuant. Wound probes to bone. Soft tissue crepitus present, increased malodor, 5cc of seropurulent drainage noted upon expression of the wound. Erythema noted spanning from R 5th digit to ankle.   Neuro: Gross and light touch sensation diminshed b/l  MSK: No pain on palpation to foot. Able to move remaining digits.       IMAGING: R foot XR   Suspect pathologic fracture of the fifth proximal phalanx.  Second and third metatarsal bone diaphyseal periostitis.    CULTURES:     A:  R foot necrotic 5th digit    P:   Patient seen and evaluated at bedside and Chart reviewed.  leukocytosis present with vitals stable  Discussed diagnosis and treatment with patient  X-rays evaluated-Suspect pathologic fracture of the fifth proximal phalanx.  Second and third metatarsal bone diaphyseal periostitis.  Pending CT of RLE   Bedside incision and drainage performed of left foot after obtaining verbal consent. Longitudinal Incision was made over the dorsal R 5th met midshaft. About 5cc of purulent drainage noted after manual expression. Flushed wound with copious amount of betadine saline mix.   Wound Cx obtained and sent to lab  Planning for R 5th digit amputation tomorrow as an ADD-ON  NPO order and Type and screen order placed  Requesting medical clearance   Dressed with betadine soaked gauge and DSD.  Patient to keep the dressing clean, dry and intact  Recommended MRI  foot with/without contrast to rule out Osteomyelitis with abscess   Recommended ID consult and broad spectrum IV antibiotics  Recommended Vasc consult if ELLIE/PVR abnormal for healing potential of wound.   Requesting ELLIE/TBI/PVR   Reviewed ESR/CRP  Discussed importance of daily foot examinations and proper shoe gear and to importance of lower Fasting Blood Glucose levels.   Podiatry to follow while in house.  Discussed with Dr. Vidal

## 2024-11-17 NOTE — ED ADULT NURSE NOTE - OBJECTIVE STATEMENT
Patient presents to ED with c/o blister to his right pinky toe.  Per patient, started on 11/1.  Seen podiatry 11/1, PMD 11/4, and vascular 11/8 with no medications prescribed.  No acute distress noted. Patient with h/o DM type II. IV access to be established. Patient to receive Xray.

## 2024-11-17 NOTE — H&P ADULT - ALLERGIC/IMMUNOLOGIC
-- DO NOT REPLY / DO NOT REPLY ALL --  -- Message is from [employer] [facility] [employer] (ECO) --    General Patient Message:     Nurse Cornelia from [facility] called in inquiring if the provider will be following the patient for orders, please follow-up.     Caller Information       Type Contact Phone/Fax    07/17/2023 03:28 PM CDT Phone (Incoming) Jennifer Lewis (Self) [phone] (M)    07/18/2023 12:11 PM CDT Phone (Outgoing) Jennifer Lewis (Self) [phone] (M)    Left Message     07/18/2023 12:59 PM CDT Phone (Incoming) Jennifer Lewis (Self) [phone] (M)    07/18/2023 02:57 PM CDT Phone (Outgoing) Jennifer Lewis (Self) [phone] (M)    07/20/2023 09:47 AM CDT Phone (Incoming) Cornelia (Nurse) [phone]     [facility]        Alternative phone number: none    Can a detailed message be left? Yes    Message Turnaround: [place]:    Refer to site's KB page for routing instructions    Please give this turnaround time to the caller:   \"You can expect to receive a response 1-3 business days after your provider's clinical team reviews the message\"              
-- DO NOT REPLY / DO NOT REPLY ALL --  -- Message is from [facility] [facility] [employer] ([facility]) --    General Patient Message: Patient is returning phone call please contact patient back     Caller Information       Type Contact Phone/Fax    07/17/2023 03:28 PM CDT Phone (Incoming) Jennifer Lewis (Self) [phone] (M)    07/18/2023 12:11 PM CDT Phone (Outgoing) Jennifer Lewis (Self) [phone] (M)    Left Message     07/18/2023 12:59 PM CDT Phone (Incoming) Jennifer Lewis (Self) [phone] (M)        Alternative phone number: none    Can a detailed message be left? Yes    Message Turnaround: [place]:    Refer to site's KB page for routing instructions    Please give this turnaround time to the caller:   \"You can expect to receive a response 1-3 business days after your provider's clinical team reviews the message\"              
First attempt to reach pt for TCM. Left message for pt to return a call back to the office.   
Patient was discharged from [facility] for UTI 7/17/23 Patient needs in home care while she recovers. Please call patient about TCM appt and also to discuss getting someone in to help her a few times a week.   
Writer returned pt's phone call. Pt reports she's currently at a SNF ([facility]) for rehab.  Pt informed to contact the office when she is discharged, so we can complete TCM call and schedule appt.     Pt verbalized understanding.   
details…

## 2024-11-18 ENCOUNTER — TRANSCRIPTION ENCOUNTER (OUTPATIENT)
Age: 54
End: 2024-11-18

## 2024-11-18 ENCOUNTER — APPOINTMENT (OUTPATIENT)
Facility: HOSPITAL | Age: 54
End: 2024-11-18

## 2024-11-18 LAB
A1C WITH ESTIMATED AVERAGE GLUCOSE RESULT: 9.1 % — HIGH (ref 4–5.6)
ANION GAP SERPL CALC-SCNC: 15 MMOL/L — SIGNIFICANT CHANGE UP (ref 5–17)
BUN SERPL-MCNC: 12.1 MG/DL — SIGNIFICANT CHANGE UP (ref 8–20)
CALCIUM SERPL-MCNC: 8.2 MG/DL — LOW (ref 8.4–10.5)
CHLORIDE SERPL-SCNC: 101 MMOL/L — SIGNIFICANT CHANGE UP (ref 96–108)
CO2 SERPL-SCNC: 21 MMOL/L — LOW (ref 22–29)
CREAT SERPL-MCNC: 0.93 MG/DL — SIGNIFICANT CHANGE UP (ref 0.5–1.3)
EGFR: 98 ML/MIN/1.73M2 — SIGNIFICANT CHANGE UP
ESTIMATED AVERAGE GLUCOSE: 214 MG/DL — HIGH (ref 68–114)
GLUCOSE BLDC GLUCOMTR-MCNC: 239 MG/DL — HIGH (ref 70–99)
GLUCOSE BLDC GLUCOMTR-MCNC: 244 MG/DL — HIGH (ref 70–99)
GLUCOSE BLDC GLUCOMTR-MCNC: 252 MG/DL — HIGH (ref 70–99)
GLUCOSE BLDC GLUCOMTR-MCNC: 264 MG/DL — HIGH (ref 70–99)
GLUCOSE BLDC GLUCOMTR-MCNC: 291 MG/DL — HIGH (ref 70–99)
GLUCOSE BLDC GLUCOMTR-MCNC: 304 MG/DL — HIGH (ref 70–99)
GLUCOSE BLDC GLUCOMTR-MCNC: 337 MG/DL — HIGH (ref 70–99)
GLUCOSE BLDC GLUCOMTR-MCNC: 403 MG/DL — HIGH (ref 70–99)
GLUCOSE SERPL-MCNC: 276 MG/DL — HIGH (ref 70–99)
HCT VFR BLD CALC: 30.2 % — LOW (ref 39–50)
HGB BLD-MCNC: 10 G/DL — LOW (ref 13–17)
MCHC RBC-ENTMCNC: 29.2 PG — SIGNIFICANT CHANGE UP (ref 27–34)
MCHC RBC-ENTMCNC: 33.1 G/DL — SIGNIFICANT CHANGE UP (ref 32–36)
MCV RBC AUTO: 88.3 FL — SIGNIFICANT CHANGE UP (ref 80–100)
PLATELET # BLD AUTO: 349 K/UL — SIGNIFICANT CHANGE UP (ref 150–400)
POTASSIUM SERPL-MCNC: 3.9 MMOL/L — SIGNIFICANT CHANGE UP (ref 3.5–5.3)
POTASSIUM SERPL-SCNC: 3.9 MMOL/L — SIGNIFICANT CHANGE UP (ref 3.5–5.3)
RBC # BLD: 3.42 M/UL — LOW (ref 4.2–5.8)
RBC # FLD: 13.8 % — SIGNIFICANT CHANGE UP (ref 10.3–14.5)
SODIUM SERPL-SCNC: 137 MMOL/L — SIGNIFICANT CHANGE UP (ref 135–145)
WBC # BLD: 9.42 K/UL — SIGNIFICANT CHANGE UP (ref 3.8–10.5)
WBC # FLD AUTO: 9.42 K/UL — SIGNIFICANT CHANGE UP (ref 3.8–10.5)

## 2024-11-18 PROCEDURE — 99233 SBSQ HOSP IP/OBS HIGH 50: CPT

## 2024-11-18 PROCEDURE — 93971 EXTREMITY STUDY: CPT | Mod: 26,RT

## 2024-11-18 PROCEDURE — 93923 UPR/LXTR ART STDY 3+ LVLS: CPT | Mod: 26

## 2024-11-18 PROCEDURE — 99232 SBSQ HOSP IP/OBS MODERATE 35: CPT | Mod: 57

## 2024-11-18 PROCEDURE — 99223 1ST HOSP IP/OBS HIGH 75: CPT

## 2024-11-18 PROCEDURE — 73630 X-RAY EXAM OF FOOT: CPT | Mod: 26,RT

## 2024-11-18 PROCEDURE — 28810 AMPUTATION TOE & METATARSAL: CPT | Mod: T9,59

## 2024-11-18 RX ORDER — KETOROLAC TROMETHAMINE 30 MG/ML
30 INJECTION INTRAMUSCULAR; INTRAVENOUS ONCE
Refills: 0 | Status: DISCONTINUED | OUTPATIENT
Start: 2024-11-18 | End: 2024-11-18

## 2024-11-18 RX ORDER — 0.9 % SODIUM CHLORIDE 0.9 %
1000 INTRAVENOUS SOLUTION INTRAVENOUS
Refills: 0 | Status: DISCONTINUED | OUTPATIENT
Start: 2024-11-18 | End: 2024-11-21

## 2024-11-18 RX ORDER — GLUCAGON INJECTION, SOLUTION 0.5 MG/.1ML
1 INJECTION, SOLUTION SUBCUTANEOUS ONCE
Refills: 0 | Status: DISCONTINUED | OUTPATIENT
Start: 2024-11-18 | End: 2024-11-21

## 2024-11-18 RX ORDER — VANCOMYCIN HCL 900 MCG/MG
1500 POWDER (GRAM) MISCELLANEOUS ONCE
Refills: 0 | Status: COMPLETED | OUTPATIENT
Start: 2024-11-18 | End: 2024-11-18

## 2024-11-18 RX ORDER — ROSUVASTATIN CALCIUM 5 MG/1
40 TABLET, FILM COATED ORAL AT BEDTIME
Refills: 0 | Status: DISCONTINUED | OUTPATIENT
Start: 2024-11-18 | End: 2024-11-21

## 2024-11-18 RX ORDER — VALSARTAN 320 MG/1
320 TABLET ORAL DAILY
Refills: 0 | Status: DISCONTINUED | OUTPATIENT
Start: 2024-11-18 | End: 2024-11-21

## 2024-11-18 RX ORDER — PIPERACILLIN SODIUM AND TAZOBACTAM SODIUM 4; .5 G/20ML; G/20ML
3.38 INJECTION, POWDER, LYOPHILIZED, FOR SOLUTION INTRAVENOUS EVERY 8 HOURS
Refills: 0 | Status: DISCONTINUED | OUTPATIENT
Start: 2024-11-18 | End: 2024-11-21

## 2024-11-18 RX ORDER — SODIUM CHLORIDE 9 MG/ML
1000 INJECTION, SOLUTION INTRAMUSCULAR; INTRAVENOUS; SUBCUTANEOUS
Refills: 0 | Status: DISCONTINUED | OUTPATIENT
Start: 2024-11-18 | End: 2024-11-21

## 2024-11-18 RX ORDER — FENTANYL 12 UG/H
25 PATCH, EXTENDED RELEASE TRANSDERMAL
Refills: 0 | Status: DISCONTINUED | OUTPATIENT
Start: 2024-11-18 | End: 2024-11-18

## 2024-11-18 RX ORDER — METOPROLOL TARTRATE 100 MG/1
50 TABLET, FILM COATED ORAL DAILY
Refills: 0 | Status: DISCONTINUED | OUTPATIENT
Start: 2024-11-18 | End: 2024-11-21

## 2024-11-18 RX ORDER — HYDROMORPHONE HYDROCHLORIDE 2 MG/1
0.5 TABLET ORAL
Refills: 0 | Status: DISCONTINUED | OUTPATIENT
Start: 2024-11-18 | End: 2024-11-18

## 2024-11-18 RX ORDER — ACETAMINOPHEN 500MG 500 MG/1
1000 TABLET, COATED ORAL ONCE
Refills: 0 | Status: DISCONTINUED | OUTPATIENT
Start: 2024-11-18 | End: 2024-11-18

## 2024-11-18 RX ORDER — INSULIN GLARGINE 100 [IU]/ML
30 INJECTION, SOLUTION SUBCUTANEOUS AT BEDTIME
Refills: 0 | Status: DISCONTINUED | OUTPATIENT
Start: 2024-11-18 | End: 2024-11-18

## 2024-11-18 RX ORDER — ALBUTEROL 90 MCG
1 AEROSOL (GRAM) INHALATION EVERY 4 HOURS
Refills: 0 | Status: DISCONTINUED | OUTPATIENT
Start: 2024-11-18 | End: 2024-11-21

## 2024-11-18 RX ORDER — ONDANSETRON HYDROCHLORIDE 4 MG/1
4 TABLET, FILM COATED ORAL ONCE
Refills: 0 | Status: DISCONTINUED | OUTPATIENT
Start: 2024-11-18 | End: 2024-11-18

## 2024-11-18 RX ORDER — ACETAMINOPHEN 500MG 500 MG/1
650 TABLET, COATED ORAL EVERY 6 HOURS
Refills: 0 | Status: DISCONTINUED | OUTPATIENT
Start: 2024-11-18 | End: 2024-11-21

## 2024-11-18 RX ORDER — ALBUTEROL 90 MCG
2.5 AEROSOL (GRAM) INHALATION EVERY 6 HOURS
Refills: 0 | Status: DISCONTINUED | OUTPATIENT
Start: 2024-11-18 | End: 2024-11-21

## 2024-11-18 RX ORDER — INSULIN GLARGINE 100 [IU]/ML
30 INJECTION, SOLUTION SUBCUTANEOUS AT BEDTIME
Refills: 0 | Status: DISCONTINUED | OUTPATIENT
Start: 2024-11-18 | End: 2024-11-19

## 2024-11-18 RX ORDER — ENOXAPARIN SODIUM 30 MG/.3ML
40 INJECTION SUBCUTANEOUS EVERY 24 HOURS
Refills: 0 | Status: DISCONTINUED | OUTPATIENT
Start: 2024-11-18 | End: 2024-11-21

## 2024-11-18 RX ADMIN — INSULIN GLARGINE 30 UNIT(S): 100 INJECTION, SOLUTION SUBCUTANEOUS at 23:12

## 2024-11-18 RX ADMIN — METOPROLOL TARTRATE 50 MILLIGRAM(S): 100 TABLET, FILM COATED ORAL at 05:12

## 2024-11-18 RX ADMIN — ACETAMINOPHEN 500MG 650 MILLIGRAM(S): 500 TABLET, COATED ORAL at 13:03

## 2024-11-18 RX ADMIN — SODIUM CHLORIDE 100 MILLILITER(S): 9 INJECTION, SOLUTION INTRAMUSCULAR; INTRAVENOUS; SUBCUTANEOUS at 12:56

## 2024-11-18 RX ADMIN — Medication 4: at 12:57

## 2024-11-18 RX ADMIN — PIPERACILLIN SODIUM AND TAZOBACTAM SODIUM 25 GRAM(S): 4; .5 INJECTION, POWDER, LYOPHILIZED, FOR SOLUTION INTRAVENOUS at 13:20

## 2024-11-18 RX ADMIN — ENOXAPARIN SODIUM 40 MILLIGRAM(S): 30 INJECTION SUBCUTANEOUS at 12:57

## 2024-11-18 RX ADMIN — ACETAMINOPHEN 500MG 650 MILLIGRAM(S): 500 TABLET, COATED ORAL at 14:15

## 2024-11-18 RX ADMIN — Medication 300 MILLIGRAM(S): at 13:20

## 2024-11-18 RX ADMIN — ROSUVASTATIN CALCIUM 40 MILLIGRAM(S): 5 TABLET, FILM COATED ORAL at 23:13

## 2024-11-18 RX ADMIN — Medication 6: at 18:42

## 2024-11-18 RX ADMIN — SODIUM CHLORIDE 100 MILLILITER(S): 9 INJECTION, SOLUTION INTRAMUSCULAR; INTRAVENOUS; SUBCUTANEOUS at 23:13

## 2024-11-18 RX ADMIN — ACETAMINOPHEN 500MG 650 MILLIGRAM(S): 500 TABLET, COATED ORAL at 23:20

## 2024-11-18 RX ADMIN — PIPERACILLIN SODIUM AND TAZOBACTAM SODIUM 25 GRAM(S): 4; .5 INJECTION, POWDER, LYOPHILIZED, FOR SOLUTION INTRAVENOUS at 05:10

## 2024-11-18 RX ADMIN — VALSARTAN 320 MILLIGRAM(S): 320 TABLET ORAL at 05:12

## 2024-11-18 RX ADMIN — PIPERACILLIN SODIUM AND TAZOBACTAM SODIUM 25 GRAM(S): 4; .5 INJECTION, POWDER, LYOPHILIZED, FOR SOLUTION INTRAVENOUS at 23:19

## 2024-11-18 RX ADMIN — Medication 4 UNIT(S): at 16:31

## 2024-11-18 RX ADMIN — Medication 6: at 08:09

## 2024-11-18 NOTE — CONSULT NOTE ADULT - ATTENDING COMMENTS
Right foot wound status post surgery with podiatry.  Vascular surgery consult was called evaluate perfusion.  ELLIE within normal limit as well as toe pressures.  Patient with very strong anterior tibial pulse.  Patient appears to have adequate perfusion for wound healing.  No further vascular surgery intervention required at this point.  Will sign off.  Please reconsult as needed.
Seen and evaluate Gas at level of metatarsal 4th and 5th based on imaging. uncontrolled Diabetes  will need source control via partial 5th possible fourth ray amputation   ELLIE/PVR consult vascular if normal   continue iv antibiotics

## 2024-11-18 NOTE — PROGRESS NOTE ADULT - ASSESSMENT
54M with a history of hypertension, hyperlipidemia, diabetes, and asthma with neuropathy, who presented with a two week history of right toe discoloration and leg swelling with malodorous odor.    #Sepsis / Cellulitis  - s/p I&D with podiatry   - ESR and CRP were elevated  - zosyn, vanc added by ID   - Blood culture pending   - Xray of the foot: Diffuse soft tissue swelling with subcutaneous air. Suspect pathologic fracture of the fifth proximal phalanx. Second and third metatarsal bone diaphyseal periostitis. Pes planus. Suspect anterior talus have fracture.  Follow-up MRI recommended.  - MRI pending  - LE duplex to r/o DVT  - ELLIE pending; will get vascular if abnormal prior to surgery given possible risk of poor healing incase of vascular compromise   - will keep NPO for now    #Diabetes  - Metformin to be held  - holding insulin pump  - c/w insulin  - titrate as need   - Consistent carbohydrate diet    #Hypertension  - Close blood pressure monitoring  - On metoprolol and valsartan    #Anemia  - The patient reported a history of anemia in the past for which he was taking B12 and multivitamin  - Monitoring hemoglobin levels    #OMID, likely pre-renal   - Cr 1.36, improved to 0.93 w/ IVF     Dispo: medically acute, pending possible amputation, on iv abx  54M with a history of hypertension, hyperlipidemia, diabetes, and asthma with neuropathy, who presented with a two week history of right toe discoloration and leg swelling with malodorous odor.    #Sepsis / Cellulitis  - s/p I&D with podiatry   - ESR and CRP were elevated  - zosyn, vanc added by ID   - Blood culture pending   - Xray of the foot: Diffuse soft tissue swelling with subcutaneous air. Suspect pathologic fracture of the fifth proximal phalanx. Second and third metatarsal bone diaphyseal periostitis. Pes planus. Suspect anterior talus have fracture.  Follow-up MRI recommended.  - MRI pending  - LE duplex to r/o DVT: negative   - pending ELLIE, vascular consult tomorrow   - NPO for OR; no CP, EKG changed, no contraindication for medical perspective given urgency for infection control     #Diabetes  - Metformin to be held  - holding insulin pump  - c/w insulin  - titrate as need   - Consistent carbohydrate diet    #Hypertension  - Close blood pressure monitoring  - On metoprolol and valsartan    #Anemia  - The patient reported a history of anemia in the past for which he was taking B12 and multivitamin  - Monitoring hemoglobin levels    #OMID, likely pre-renal   - Cr 1.36, improved to 0.93 w/ IVF     Dispo: medically acute, pending possible amputation, on iv abx

## 2024-11-18 NOTE — PROGRESS NOTE ADULT - SUBJECTIVE AND OBJECTIVE BOX
[facility]    INTERVAL HISTORY:  Overnight, no acute events.     Patient seen and examined at bedside this morning. Has been NPO since midnight. Patient denies chest pain, SOB, abd pain, N/V, fever, chills, dysuria or any other complaints.     MEDICATIONS  (STANDING):  dextrose 5%. 1000 milliLiter(s) (50 mL/Hr) IV Continuous <Continuous>  dextrose 5%. 1000 milliLiter(s) (100 mL/Hr) IV Continuous <Continuous>  dextrose 50% Injectable 25 Gram(s) IV Push once  dextrose 50% Injectable 12.5 Gram(s) IV Push once  dextrose 50% Injectable 25 Gram(s) IV Push once  enoxaparin Injectable 40 milliGRAM(s) SubCutaneous every 24 hours  glucagon  Injectable 1 milliGRAM(s) IntraMuscular once  influenza   Vaccine 0.5 milliLiter(s) IntraMuscular once  insulin glargine Injectable (LANTUS) 30 Unit(s) SubCutaneous at bedtime  insulin lispro (ADMELOG) corrective regimen sliding scale   SubCutaneous three times a day before meals  insulin lispro Injectable (ADMELOG) 5 Unit(s) SubCutaneous three times a day before meals  metoprolol succinate ER 50 milliGRAM(s) Oral daily  piperacillin/tazobactam IVPB.. 3.375 Gram(s) IV Intermittent every 8 hours  rosuvastatin 40 milliGRAM(s) Oral at bedtime  sodium chloride 0.9%. 1000 milliLiter(s) (100 mL/Hr) IV Continuous <Continuous>  valsartan 320 milliGRAM(s) Oral daily  vancomycin  IVPB 1500 milliGRAM(s) IV Intermittent once    MEDICATIONS  (PRN):  acetaminophen     Tablet .. 650 milliGRAM(s) Oral every 6 hours PRN Temp greater or equal to 38C (100.4F), Mild Pain (1 - 3)  albuterol    0.083% 2.5 milliGRAM(s) Nebulizer every 6 hours PRN Shortness of Breath and/or Wheezing  albuterol    90 MICROgram(s) HFA Inhaler 1 Puff(s) Inhalation every 4 hours PRN Shortness of Breath and/or Wheezing  dextrose Oral Gel 15 Gram(s) Oral once PRN Blood Glucose LESS THAN 70 milliGRAM(s)/deciliter        I&O's Summary    17 Nov 2024 07:01  -  18 Nov 2024 07:00  --------------------------------------------------------  IN: 1600 mL / OUT: 1250 mL / NET: 350 mL    18 Nov 2024 07:01  -  18 Nov 2024 13:01  --------------------------------------------------------  IN: 0 mL / OUT: 600 mL / NET: -600 mL        PHYSICAL EXAM:  Vital Signs Last 24 Hrs  T(C): 36.3 (18 Nov 2024 09:47), Max: 38.6 (17 Nov 2024 13:48)  T(F): 97.4 (18 Nov 2024 09:47), Max: 101.5 (17 Nov 2024 13:48)  HR: 88 (18 Nov 2024 09:47) (73 - 93)  BP: 140/75 (18 Nov 2024 09:47) (130/77 - 163/80)  BP(mean): 95 (18 Nov 2024 04:50) (95 - 95)  RR: 18 (18 Nov 2024 04:50) (18 - 18)  SpO2: 100% (18 Nov 2024 09:47) (97% - 100%)    Parameters below as of 18 Nov 2024 10:15  Patient On (Oxygen Delivery Method): room air          CONSTITUTIONAL: No apparent distress  HEENT: Normocephalic, Atraumatic.   RESPIRATORY:  lungs are clear to auscultation bilaterally, No crackles, rhonchi, wheezes  CARDIOVASCULAR: Regular rate and rhythm  EXT: +LLE edema, L foot wrapped   ABDOMEN: Soft, non-distended, nontender to palpation, +BS  MUSCLOSKELETAL: moving all 4 extremities spontaneously  PSYCH: thoughts linear, affect appropriate  NEUROLOGY: Alert, Oriented x3    LABS:                        10.0   9.42  )-----------( 349      ( 18 Nov 2024 03:56 )             30.2     11-18    137  |  101  |  12.1  ----------------------------<  276[H]  3.9   |  21.0[L]  |  0.93    Ca    8.2[L]      18 Nov 2024 03:56    TPro  8.5  /  Alb  4.0  /  TBili  0.4  /  DBili  x   /  AST  26  /  ALT  28  /  AlkPhos  137[H]  11-17    PT/INR - ( 17 Nov 2024 15:00 )   PT: 14.2 sec;   INR: 1.23 ratio         PTT - ( 17 Nov 2024 15:00 )  PTT:31.0 sec      Urinalysis Basic - ( 18 Nov 2024 03:56 )    Color: x / Appearance: x / SG: x / pH: x  Gluc: 276 mg/dL / Ketone: x  / Bili: x / Urobili: x   Blood: x / Protein: x / Nitrite: x   Leuk Esterase: x / RBC: x / WBC x   Sq Epi: x / Non Sq Epi: x / Bacteria: x        Culture - Blood (collected 17 Nov 2024 05:30)  Source: .Blood BLOOD  Preliminary Report (18 Nov 2024 09:01):    No growth at 24 hours      CAPILLARY BLOOD GLUCOSE      POCT Blood Glucose.: 239 mg/dL (18 Nov 2024 12:53)  POCT Blood Glucose.: 291 mg/dL (18 Nov 2024 08:05)  POCT Blood Glucose.: 304 mg/dL (18 Nov 2024 05:47)  POCT Blood Glucose.: 298 mg/dL (17 Nov 2024 21:06)  POCT Blood Glucose.: 262 mg/dL (17 Nov 2024 16:29)        RADIOLOGY & ADDITIONAL TESTS:  Results Reviewed

## 2024-11-18 NOTE — BRIEF OPERATIVE NOTE - OPERATION/FINDINGS
See operative report.   Patient underwent partial 4th and 5th ray amputation on the right foot. Approx 5cc purulent drainage from area. Area was left open and patient will need to return to the operating room later this week or early next week.

## 2024-11-18 NOTE — CONSULT NOTE ADULT - SUBJECTIVE AND OBJECTIVE BOX
INFECTIOUS DISEASES AND INTERNAL MEDICINE at [place]  =======================================================  Chris Voss MD  Diplomates [employer] Board of Internal Medicine and Infectious Diseases  Telephone [phone]  Fax            [phone]  =======================================================    JANIS [id number]      HPI:  54M who presented with continued right foot swelling and fifth toe discoloration. The patient reported that he had first noted a blister to the fifth toe two weeks prior and was evaluated by his primary care physician and podiatrist without further intervention. The patient reported that the blister opened with drainage of fluid. He denied any pain but reported that he had decreased sensation due to neuropathy. He did not have any fevers or chills but noted a malodorous odor from the foot.. He continued to put a dressing on the toe but the discoloration to the toe and swelling to the ankle did not improve. He denied any recent injury to the toe and denied any similar findings on the other toes.  AS ABOVE WITH NECROTIC CHANGES TO FOOT AS PER PHOTO OF PT  PODIATRY PLAN FOR SURGICAL INTERVENTION     PMH: DM, HTN, HLD, Asthma  PSH: Denied  Family History: Parents with diabetes (17 Nov 2024 07:44)      PAST MEDICAL & SURGICAL HISTORY:      ANTIBIOTICS  piperacillin/tazobactam IVPB.. 3.375 Gram(s) IV Intermittent every 8 hours      Allergies    No Known Allergies    Intolerances        SOCIAL HISTORY:     FAMILY HX   FAMILY HISTORY:      Vital Signs Last 24 Hrs  T(C): 36.4 (18 Nov 2024 04:50), Max: 38.6 (17 Nov 2024 13:48)  T(F): 97.6 (18 Nov 2024 04:50), Max: 101.5 (17 Nov 2024 13:48)  HR: 86 (18 Nov 2024 04:50) (73 - 96)  BP: 138/74 (18 Nov 2024 04:50) (130/77 - 163/80)  BP(mean): 95 (18 Nov 2024 04:50) (95 - 95)  RR: 18 (18 Nov 2024 04:50) (18 - 18)  SpO2: 100% (18 Nov 2024 04:50) (95% - 100%)    Parameters below as of 18 Nov 2024 04:50  Patient On (Oxygen Delivery Method): room air      Drug Dosing Weight  Height (cm): 188 (17 Nov 2024 04:06)  Weight (kg): 141 (17 Nov 2024 04:06)  BMI (kg/m2): 39.9 (17 Nov 2024 04:06)  BSA (m2): 2.62 (17 Nov 2024 04:06)      REVIEW OF SYSTEMS:    CONSTITUTIONAL:  As per HPI.    HEENT:  Eyes:  No diplopia or blurred vision. ENT:  No earache, sore throat or runny nose.    CARDIOVASCULAR:  No pressure, squeezing, strangling, tightness, heaviness or aching about the chest, neck, axilla or epigastrium.    RESPIRATORY:  No cough, shortness of breath, PND or orthopnea.    GASTROINTESTINAL:  No nausea, vomiting or diarrhea.    GENITOURINARY:  No dysuria, frequency or urgency.    MUSCULOSKELETAL:  As per HPI.    SKIN:  AS PER HPI    NEUROLOGIC:  No paresthesias, fasciculations, seizures or weakness.                  PHYSICAL EXAMINATION:    GENERAL: The patient is a _____in no apparent distress. ___     VITAL SIGNS: T(C): 36.4 (11-18-24 @ 04:50), Max: 38.6 (11-17-24 @ 13:48)  HR: 86 (11-18-24 @ 04:50) (73 - 96)  BP: 138/74 (11-18-24 @ 04:50) (130/77 - 163/80)  RR: 18 (11-18-24 @ 04:50) (18 - 18)  SpO2: 100% (11-18-24 @ 04:50) (95% - 100%)  Wt(kg): --    HEENT: Head is normocephalic and atraumatic.  ANICTERIC  NECK: Supple. No carotid bruits.  No lymphadenopathy or thyromegaly.    LUNGS:COARSE BREATH SOUNDS    HEART: Regular rate and rhythm without murmur.    ABDOMEN: Soft, nontender, and nondistended.  Positive bowel sounds.  No hepatosplenomegaly was noted. NO REBOUND NO GUARDING    EXTREMITIES: RIGHT FOOT WRAPPED PHOTOS SEEN NECROTIC BLACK 5TH TOE AND GENERALIZED EDEMA AND ERYTHEMA FOREFOOT  AND BLISTERING     NEUROLOGIC: NON FOCAL      SKIN: No ulceration or induration present. NO RASH        BLOOD CULTURES       URINE CX          LABS:                        10.0   9.42  )-----------( 349      ( 18 Nov 2024 03:56 )             30.2     11-18    137  |  101  |  12.1  ----------------------------<  276[H]  3.9   |  21.0[L]  |  0.93    Ca    8.2[L]      18 Nov 2024 03:56    TPro  8.5  /  Alb  4.0  /  TBili  0.4  /  DBili  x   /  AST  26  /  ALT  28  /  AlkPhos  137[H]  11-17    PT/INR - ( 17 Nov 2024 15:00 )   PT: 14.2 sec;   INR: 1.23 ratio         PTT - ( 17 Nov 2024 15:00 )  PTT:31.0 sec  Urinalysis Basic - ( 18 Nov 2024 03:56 )    Color: x / Appearance: x / SG: x / pH: x  Gluc: 276 mg/dL / Ketone: x  / Bili: x / Urobili: x   Blood: x / Protein: x / Nitrite: x   Leuk Esterase: x / RBC: x / WBC x   Sq Epi: x / Non Sq Epi: x / Bacteria: x        RADIOLOGY & ADDITIONAL STUDIES:      ASSESSMENT/PLAN    54M who presented with continued right foot swelling and fifth toe discoloration. The patient reported that he had first noted a blister to the fifth toe two weeks prior and was evaluated by his primary care physician and podiatrist without further intervention. The patient reported that the blister opened with drainage of fluid. He denied any pain but reported that he had decreased sensation due to neuropathy. He did not have any fevers or chills but noted a malodorous odor from the foot.. He continued to put a dressing on the toe but the discoloration to the toe and swelling to the ankle did not improve. He denied any recent injury to the toe and denied any similar findings on the other toes.  AS ABOVE WITH NECROTIC CHANGES TO FOOT AS PER PHOTO OF PT  PT WITH FOUL SMELLING FOOT  WILL CONTINUE ZOSYN   WOULD ADD VANCOMYCIN  SPOKE TO PHARMACY   WOULD CONSIDER VASC EVALUATION   WILL FOLLOWUP WILL D/W PODIATRY                 RICHAR MOONEY MD

## 2024-11-18 NOTE — CONSULT NOTE ADULT - SUBJECTIVE AND OBJECTIVE BOX
Vascular Attending:  Esa DARNELL     Vascular Surgery Hpi: admission HPI reviewed below   Patient currently unavailable for full HPI       HPI:  54M who presented with continued right foot swelling and fifth toe discoloration. The patient reported that he had first noted a blister to the fifth toe two weeks prior and was evaluated by his primary care physician and podiatrist without further intervention. The patient reported that the blister opened with drainage of fluid. He denied any pain but reported that he had decreased sensation due to neuropathy. He did not have any fevers or chills but noted a malodorous odor from the foot.. He continued to put a dressing on the toe but the discoloration to the toe and swelling to the ankle did not improve. He denied any recent injury to the toe and denied any similar findings on the other toes.    PMH: DM, HTN, HLD, Asthma  PSH: Denied  Family History: Parents with diabetes (17 Nov 2024 07:44)      PAST MEDICAL & SURGICAL HISTORY:      REVIEW OF SYSTEMS      General:	    Skin/Breast:  	  Ophthalmologic:  	  ENMT:	    Respiratory and Thorax:  	  Cardiovascular:	    Gastrointestinal:	    Genitourinary:	    Musculoskeletal:	    Neurological:	    Psychiatric:	    Hematology/Lymphatics:	    Endocrine:	    Allergic/Immunologic:	    MEDICATIONS  (STANDING):  dextrose 5%. 1000 milliLiter(s) (50 mL/Hr) IV Continuous <Continuous>  dextrose 5%. 1000 milliLiter(s) (100 mL/Hr) IV Continuous <Continuous>  dextrose 50% Injectable 25 Gram(s) IV Push once  dextrose 50% Injectable 12.5 Gram(s) IV Push once  dextrose 50% Injectable 25 Gram(s) IV Push once  enoxaparin Injectable 40 milliGRAM(s) SubCutaneous every 24 hours  glucagon  Injectable 1 milliGRAM(s) IntraMuscular once  influenza   Vaccine 0.5 milliLiter(s) IntraMuscular once  insulin glargine Injectable (LANTUS) 30 Unit(s) SubCutaneous at bedtime  insulin lispro (ADMELOG) corrective regimen sliding scale   SubCutaneous three times a day before meals  insulin lispro Injectable (ADMELOG) 5 Unit(s) SubCutaneous three times a day before meals  metoprolol succinate ER 50 milliGRAM(s) Oral daily  piperacillin/tazobactam IVPB.. 3.375 Gram(s) IV Intermittent every 8 hours  rosuvastatin 40 milliGRAM(s) Oral at bedtime  sodium chloride 0.9%. 1000 milliLiter(s) (100 mL/Hr) IV Continuous <Continuous>  valsartan 320 milliGRAM(s) Oral daily    MEDICATIONS  (PRN):  acetaminophen     Tablet .. 650 milliGRAM(s) Oral every 6 hours PRN Temp greater or equal to 38C (100.4F), Mild Pain (1 - 3)  albuterol    0.083% 2.5 milliGRAM(s) Nebulizer every 6 hours PRN Shortness of Breath and/or Wheezing  albuterol    90 MICROgram(s) HFA Inhaler 1 Puff(s) Inhalation every 4 hours PRN Shortness of Breath and/or Wheezing  dextrose Oral Gel 15 Gram(s) Oral once PRN Blood Glucose LESS THAN 70 milliGRAM(s)/deciliter      Allergies    No Known Allergies    Intolerances        SOCIAL HISTORY:      Vital Signs Last 24 Hrs  T(C): 36.8 (18 Nov 2024 15:54), Max: 37.6 (17 Nov 2024 17:50)  T(F): 98.2 (18 Nov 2024 15:54), Max: 99.6 (17 Nov 2024 17:50)  HR: 81 (18 Nov 2024 15:54) (81 - 93)  BP: 168/83 (18 Nov 2024 15:54) (130/77 - 168/83)  BP(mean): 95 (18 Nov 2024 04:50) (95 - 95)  RR: 16 (18 Nov 2024 15:54) (16 - 18)  SpO2: 99% (18 Nov 2024 15:54) (97% - 100%)    Parameters below as of 18 Nov 2024 15:54  Patient On (Oxygen Delivery Method): room air        PHYSICAL EXAM:      Constitutional:    Eyes:    ENMT:    Neck:    Breasts:    Back:    Respiratory:    Cardiovascular:    Gastrointestinal:    Genitourinary:    Rectal:    Extremities:    Vascular:    Neurological:    Skin:    Lymph Nodes:    Musculoskeletal:    Psychiatric:      Pulses:   Right:                                                                          Left:  FEM [ ]2+ [ ]1+ [ ]doppler                                             FEM [ ]2+ [ ]1+ [ ]doppler    POP [ ]2+ [ ]1+ [ ]doppler                                             POP [ ]2+ [ ]1+ [ ]doppler    DP [ ]2+ [ ]1+ [ ]doppler                                                DP [ ]2+ [ ]1+ [ ]doppler  PT[ ]2+ [ ]1+ [ ]doppler                                                  PT [ ]2+ [ ]1+ [ ]doppler      LABS:                        10.0   9.42  )-----------( 349      ( 18 Nov 2024 03:56 )             30.2     11-18    137  |  101  |  12.1  ----------------------------<  276[H]  3.9   |  21.0[L]  |  0.93    Ca    8.2[L]      18 Nov 2024 03:56    TPro  8.5  /  Alb  4.0  /  TBili  0.4  /  DBili  x   /  AST  26  /  ALT  28  /  AlkPhos  137[H]  11-17    PT/INR - ( 17 Nov 2024 15:00 )   PT: 14.2 sec;   INR: 1.23 ratio         PTT - ( 17 Nov 2024 15:00 )  PTT:31.0 sec  Urinalysis Basic - ( 18 Nov 2024 03:56 )    Color: x / Appearance: x / SG: x / pH: x  Gluc: 276 mg/dL / Ketone: x  / Bili: x / Urobili: x   Blood: x / Protein: x / Nitrite: x   Leuk Esterase: x / RBC: x / WBC x   Sq Epi: x / Non Sq Epi: x / Bacteria: x        RADIOLOGY & ADDITIONAL STUDIES    Impression and Plan: Vascular Attending:  Esa DARNELL     Vascular Surgery Hpi: admission HPI reviewed below         HPI:  54M who presented with continued right foot swelling and fifth toe discoloration. The patient reported that he had first noted a blister to the fifth toe two weeks prior and was evaluated by his primary care physician and podiatrist without further intervention. The patient reported that the blister opened with drainage of fluid. He denied any pain but reported that he had decreased sensation due to neuropathy. He did not have any fevers or chills but noted a malodorous odor from the foot.. He continued to put a dressing on the toe but the discoloration to the toe and swelling to the ankle did not improve. He denied any recent injury to the toe and denied any similar findings on the other toes.    PMH: DM, HTN, HLD, Asthma  PSH: Denied  Family History: Parents with diabetes (17 Nov 2024 07:44)      PAST MEDICAL & SURGICAL HISTORY:      REVIEW OF SYSTEMS      General:	    Skin/Breast:  	  Ophthalmologic:  	  ENMT:	    Respiratory and Thorax:  	  Cardiovascular:	    Gastrointestinal:	    Genitourinary:	    Musculoskeletal:	    Neurological:	    Psychiatric:	    Hematology/Lymphatics:	    Endocrine:	    Allergic/Immunologic:	    MEDICATIONS  (STANDING):  dextrose 5%. 1000 milliLiter(s) (50 mL/Hr) IV Continuous <Continuous>  dextrose 5%. 1000 milliLiter(s) (100 mL/Hr) IV Continuous <Continuous>  dextrose 50% Injectable 25 Gram(s) IV Push once  dextrose 50% Injectable 12.5 Gram(s) IV Push once  dextrose 50% Injectable 25 Gram(s) IV Push once  enoxaparin Injectable 40 milliGRAM(s) SubCutaneous every 24 hours  glucagon  Injectable 1 milliGRAM(s) IntraMuscular once  influenza   Vaccine 0.5 milliLiter(s) IntraMuscular once  insulin glargine Injectable (LANTUS) 30 Unit(s) SubCutaneous at bedtime  insulin lispro (ADMELOG) corrective regimen sliding scale   SubCutaneous three times a day before meals  insulin lispro Injectable (ADMELOG) 5 Unit(s) SubCutaneous three times a day before meals  metoprolol succinate ER 50 milliGRAM(s) Oral daily  piperacillin/tazobactam IVPB.. 3.375 Gram(s) IV Intermittent every 8 hours  rosuvastatin 40 milliGRAM(s) Oral at bedtime  sodium chloride 0.9%. 1000 milliLiter(s) (100 mL/Hr) IV Continuous <Continuous>  valsartan 320 milliGRAM(s) Oral daily    MEDICATIONS  (PRN):  acetaminophen     Tablet .. 650 milliGRAM(s) Oral every 6 hours PRN Temp greater or equal to 38C (100.4F), Mild Pain (1 - 3)  albuterol    0.083% 2.5 milliGRAM(s) Nebulizer every 6 hours PRN Shortness of Breath and/or Wheezing  albuterol    90 MICROgram(s) HFA Inhaler 1 Puff(s) Inhalation every 4 hours PRN Shortness of Breath and/or Wheezing  dextrose Oral Gel 15 Gram(s) Oral once PRN Blood Glucose LESS THAN 70 milliGRAM(s)/deciliter      Allergies    No Known Allergies    Intolerances        SOCIAL HISTORY: non contributory       Vital Signs Last 24 Hrs  T(C): 36.8 (18 Nov 2024 15:54), Max: 37.6 (17 Nov 2024 17:50)  T(F): 98.2 (18 Nov 2024 15:54), Max: 99.6 (17 Nov 2024 17:50)  HR: 81 (18 Nov 2024 15:54) (81 - 93)  BP: 168/83 (18 Nov 2024 15:54) (130/77 - 168/83)  BP(mean): 95 (18 Nov 2024 04:50) (95 - 95)  RR: 16 (18 Nov 2024 15:54) (16 - 18)  SpO2: 99% (18 Nov 2024 15:54) (97% - 100%)    Parameters below as of 18 Nov 2024 15:54  Patient On (Oxygen Delivery Method): room air        PHYSICAL EXAM:      Constitutional: no distress, alert and oriented     Eyes:    ENMT:    Neck:    Breasts:    Back:    Respiratory:    Cardiovascular:    Gastrointestinal:    Genitourinary:    Rectal:    Extremities: Right foot with clean dressing in place     Vascular: palpable right AT    Neurological:    Skin:    Lymph Nodes:    Musculoskeletal:    Psychiatric:      Pulses:   Right:                                                                          Left:  FEM [ ]2+ [ ]1+ [ ]doppler                                             FEM [ ]2+ [ ]1+ [ ]doppler    POP [ ]2+ [ ]1+ [ ]doppler                                             POP [ ]2+ [ ]1+ [ ]doppler    DP [ ]2+ [ ]1+ [ ]doppler                                                DP [ ]2+ [ ]1+ [ ]doppler  PT[ ]2+ [ ]1+ [ ]doppler                                                  PT [ ]2+ [ ]1+ [ ]doppler      LABS:                        10.0   9.42  )-----------( 349      ( 18 Nov 2024 03:56 )             30.2     11-18    137  |  101  |  12.1  ----------------------------<  276[H]  3.9   |  21.0[L]  |  0.93    Ca    8.2[L]      18 Nov 2024 03:56    TPro  8.5  /  Alb  4.0  /  TBili  0.4  /  DBili  x   /  AST  26  /  ALT  28  /  AlkPhos  137[H]  11-17    PT/INR - ( 17 Nov 2024 15:00 )   PT: 14.2 sec;   INR: 1.23 ratio         PTT - ( 17 Nov 2024 15:00 )  PTT:31.0 sec  Urinalysis Basic - ( 18 Nov 2024 03:56 )    Color: x / Appearance: x / SG: x / pH: x  Gluc: 276 mg/dL / Ketone: x  / Bili: x / Urobili: x   Blood: x / Protein: x / Nitrite: x   Leuk Esterase: x / RBC: x / WBC x   Sq Epi: x / Non Sq Epi: x / Bacteria: x        RADIOLOGY & ADDITIONAL STUDIES    Impression and Plan: Vascular Attending:  Esa DARNELL     Vascular Surgery Hpi: admission HPI reviewed below   Reported right foot infection, underlying osteomyelitis  Now s/p right 4/5 toe amps on 11/18  History of DM  vascular surgical eval requested          HPI:  54M who presented with continued right foot swelling and fifth toe discoloration. The patient reported that he had first noted a blister to the fifth toe two weeks prior and was evaluated by his primary care physician and podiatrist without further intervention. The patient reported that the blister opened with drainage of fluid. He denied any pain but reported that he had decreased sensation due to neuropathy. He did not have any fevers or chills but noted a malodorous odor from the foot.. He continued to put a dressing on the toe but the discoloration to the toe and swelling to the ankle did not improve. He denied any recent injury to the toe and denied any similar findings on the other toes.    PMH: DM, HTN, HLD, Asthma  PSH: Denied  Family History: Parents with diabetes (17 Nov 2024 07:44)      PAST MEDICAL & SURGICAL HISTORY:      REVIEW OF SYSTEMS; see HPI       General:	    Skin/Breast:  	  Ophthalmologic:  	  ENMT:	    Respiratory and Thorax:  	  Cardiovascular:	    Gastrointestinal:	    Genitourinary:	    Musculoskeletal:	    Neurological:	    Psychiatric:	    Hematology/Lymphatics:	    Endocrine:	    Allergic/Immunologic:	    MEDICATIONS  (STANDING):  dextrose 5%. 1000 milliLiter(s) (50 mL/Hr) IV Continuous <Continuous>  dextrose 5%. 1000 milliLiter(s) (100 mL/Hr) IV Continuous <Continuous>  dextrose 50% Injectable 25 Gram(s) IV Push once  dextrose 50% Injectable 12.5 Gram(s) IV Push once  dextrose 50% Injectable 25 Gram(s) IV Push once  enoxaparin Injectable 40 milliGRAM(s) SubCutaneous every 24 hours  glucagon  Injectable 1 milliGRAM(s) IntraMuscular once  influenza   Vaccine 0.5 milliLiter(s) IntraMuscular once  insulin glargine Injectable (LANTUS) 30 Unit(s) SubCutaneous at bedtime  insulin lispro (ADMELOG) corrective regimen sliding scale   SubCutaneous three times a day before meals  insulin lispro Injectable (ADMELOG) 5 Unit(s) SubCutaneous three times a day before meals  metoprolol succinate ER 50 milliGRAM(s) Oral daily  piperacillin/tazobactam IVPB.. 3.375 Gram(s) IV Intermittent every 8 hours  rosuvastatin 40 milliGRAM(s) Oral at bedtime  sodium chloride 0.9%. 1000 milliLiter(s) (100 mL/Hr) IV Continuous <Continuous>  valsartan 320 milliGRAM(s) Oral daily    MEDICATIONS  (PRN):  acetaminophen     Tablet .. 650 milliGRAM(s) Oral every 6 hours PRN Temp greater or equal to 38C (100.4F), Mild Pain (1 - 3)  albuterol    0.083% 2.5 milliGRAM(s) Nebulizer every 6 hours PRN Shortness of Breath and/or Wheezing  albuterol    90 MICROgram(s) HFA Inhaler 1 Puff(s) Inhalation every 4 hours PRN Shortness of Breath and/or Wheezing  dextrose Oral Gel 15 Gram(s) Oral once PRN Blood Glucose LESS THAN 70 milliGRAM(s)/deciliter      Allergies    No Known Allergies    Intolerances        SOCIAL HISTORY: non contributory       Vital Signs Last 24 Hrs  T(C): 36.8 (18 Nov 2024 15:54), Max: 37.6 (17 Nov 2024 17:50)  T(F): 98.2 (18 Nov 2024 15:54), Max: 99.6 (17 Nov 2024 17:50)  HR: 81 (18 Nov 2024 15:54) (81 - 93)  BP: 168/83 (18 Nov 2024 15:54) (130/77 - 168/83)  BP(mean): 95 (18 Nov 2024 04:50) (95 - 95)  RR: 16 (18 Nov 2024 15:54) (16 - 18)  SpO2: 99% (18 Nov 2024 15:54) (97% - 100%)    Parameters below as of 18 Nov 2024 15:54  Patient On (Oxygen Delivery Method): room air        PHYSICAL EXAM:      Constitutional: no distress, alert and oriented     Eyes: no scleral icterus     ENMT: moist membranes     Neck: supple, no access catheters     Respiratory: non labored     Cardiovascular: rrr via peripheral palpation     Gastrointestinal: obese, soft    Genitourinary: no eubanks     Rectal: not examined     Extremities: Right foot with clean dressing in place     Vascular: palpable right AT    Neurological: no noted motor or sensory     Skin: warm         Psychiatric: appropriate affect       Pulses:   Right:                                                                          Left:  FEM [ ]2+ [ ]1+ [ ]doppler                                             FEM [ ]2+ [ ]1+ [ ]doppler    POP [ ]2+ [ ]1+ [ ]doppler                                             POP [ ]2+ [ ]1+ [ ]doppler    DP [ ]2+ [ ]1+ [ ]doppler                                                DP [ ]2+ [ ]1+ [ ]doppler  PT[ ]2+ [ ]1+ [ ]doppler                                                  PT [ ]2+ [ ]1+ [ ]doppler      LABS:                        10.0   9.42  )-----------( 349      ( 18 Nov 2024 03:56 )             30.2     11-18    137  |  101  |  12.1  ----------------------------<  276[H]  3.9   |  21.0[L]  |  0.93    Ca    8.2[L]      18 Nov 2024 03:56    TPro  8.5  /  Alb  4.0  /  TBili  0.4  /  DBili  x   /  AST  26  /  ALT  28  /  AlkPhos  137[H]  11-17    PT/INR - ( 17 Nov 2024 15:00 )   PT: 14.2 sec;   INR: 1.23 ratio         PTT - ( 17 Nov 2024 15:00 )  PTT:31.0 sec  Urinalysis Basic - ( 18 Nov 2024 03:56 )    Color: x / Appearance: x / SG: x / pH: x  Gluc: 276 mg/dL / Ketone: x  / Bili: x / Urobili: x   Blood: x / Protein: x / Nitrite: x   Leuk Esterase: x / RBC: x / WBC x   Sq Epi: x / Non Sq Epi: x / Bacteria: x        RADIOLOGY & ADDITIONAL STUDIES  < from: US Physiol Extremity Lower 3+ Level, BI (11.18.24 @ 12:18) >  ACC: [id number] EXAM:  US PHYSIOL LWR EXT 3+ LEV BI   ORDERED BY: ANIBAL BRICEÑO     PROCEDURE DATE:  11/18/2024          INTERPRETATION:  Clinical information: Nonsmoker, diabetic, hypertension,   hyperlipidemia, bilateral lower extremity claudication, presents with   blister on right toe.    COMPARISON: None available.    TECHNIQUE: ABIs/PVR.    FINDINGS: Ankle brachial index measures 1.01 on the right and 1.04 on the   left. No segmental arterial pressure gradient is detected. Toe brachial   index measures 0.69 on the right and 0.89 on the left.    PVR waveforms are normal in amplitude and pulsatility through the   metatarsal level. Right digital waveform is asymmetrically diminished in   amplitude.    IMPRESSION: No Doppler evidence ofhemodynamically significant arterial   inflow limitation in either lower extremity.    --- End of Report ---            KIRSTEN CABRERA MD; Attending Radiologist  This document has been electronically signed. Nov 18 2024  8:22PM    < end of copied text >      Impression and Plan:    Right diabetic foot infection, osteo, s/p podiatry intervention  no gross perfusion abnormalities on physical exam or non invasive examination     -- No need for any immediate vascular surgical intervention  - will relay above to the covering vascular surgical attending

## 2024-11-18 NOTE — PROGRESS NOTE ADULT - ASSESSMENT
A:  R foot necrotic 5th digit    P:   Patient seen and evaluated at bedside and Chart reviewed.  leukocytosis present with vitals stable  Discussed diagnosis and treatment with patient  X-rays evaluated-Suspect pathologic fracture of the fifth proximal phalanx.  CT reviewed- pending final read   Recommended MRI foot with/without contrast to rule out Osteomyelitis with abscess   Appreciate ID recommendations  Recommended Vasc consult if ELLIE/PVR abnormal for healing potential of wound  Venous duplex pending   Second and third metatarsal bone diaphyseal periostitis.  Pending CT of RLE   Wound Cx pending   Planning for R 5th digit amputation today as an ADD-ON. Patient was given risks and benefits to surgery. Patient understood all information explained to him. Patient amenable.  NPO   Medical clearance appreciated   Dressed with betadine soaked gauge and DSD.  Patient to keep the dressing clean, dry and intact  Reviewed ESR/CRP  Discussed importance of daily foot examinations and proper shoe gear and to importance of lower Fasting Blood Glucose levels.   Podiatry to follow while in house.  Discussed with Dr. Vidal A:  R foot necrotic 5th digit    P:   Patient seen and evaluated at bedside and Chart reviewed.  leukocytosis present with vitals stable  Discussed diagnosis and treatment with patient  X-rays evaluated-Suspect pathologic fracture of the fifth proximal phalanx.  CT reviewed- pending final read   Recommended MRI foot with/without contrast to rule out Osteomyelitis with abscess   Appreciate ID recommendations  Recommended Vasc consult if ELLIE/PVR abnormal for healing potential of wound  Venous duplex pending   Second and third metatarsal bone diaphyseal periostitis.  Pending CT of RLE   Wound Cx pending   Planning for R 5th digit amputation today as an ADD-ON. Patient was given risks and benefits to surgery. Patient understood all information explained to him. Patient amenable.  NPO   Medical clearance appreciated   Dressed with betadine soaked gauge and DSD.  Patient to keep the dressing clean, dry and intact  Reviewed ESR/CRP  Discussed importance of daily foot examinations and proper shoe gear and to importance of lower Fasting Blood Glucose levels.   Podiatry to follow while in house.  Seen and discussed treatment with Dr. Valdivia. Discussed with Dr. Vidal

## 2024-11-18 NOTE — PROGRESS NOTE ADULT - SUBJECTIVE AND OBJECTIVE BOX
Interval: Patient was seen resting in bed. Patient stated he does not have pain to his foot, but he does not like the smell or swelling. Patient is going to OR today for partial 5th ray amputation likely to be left open. Patient amenable.     HPI:  A 54-year-old male with a history of neuropathy and recurrent foot ulcers presents with right foot swelling and fifth toe discoloration. He developed a blister on the fifth toe on November 8th, which subsequently ruptured with fluid drainage. Despite regular dressing changes, the discoloration and ankle swelling persisted. He denies pain, fever, or chills but reports a malodorous odor from the foot. Prior evaluations by his PCP and podiatrist resulted in no intervention. He denied any recent injury to the toe and denied any similar findings on the other toes.    PMH: DM, HTN, HLD, Asthma  PSH: Denied  Family History: Parents with diabetes (17 Nov 2024 07:44)    PMH:  Allergies: No Known Allergies    Medications acetaminophen     Tablet .. 650 milliGRAM(s) Oral every 6 hours PRN  albuterol    0.083% 2.5 milliGRAM(s) Nebulizer every 6 hours PRN  albuterol    90 MICROgram(s) HFA Inhaler 1 Puff(s) Inhalation every 4 hours PRN  dextrose 5%. 1000 milliLiter(s) IV Continuous <Continuous>  dextrose 5%. 1000 milliLiter(s) IV Continuous <Continuous>  dextrose 50% Injectable 25 Gram(s) IV Push once  dextrose 50% Injectable 12.5 Gram(s) IV Push once  dextrose 50% Injectable 25 Gram(s) IV Push once  dextrose Oral Gel 15 Gram(s) Oral once PRN  enoxaparin Injectable 40 milliGRAM(s) SubCutaneous every 24 hours  glucagon  Injectable 1 milliGRAM(s) IntraMuscular once  influenza   Vaccine 0.5 milliLiter(s) IntraMuscular once  insulin glargine Injectable (LANTUS) 30 Unit(s) SubCutaneous at bedtime  insulin lispro (ADMELOG) corrective regimen sliding scale   SubCutaneous three times a day before meals  insulin lispro Injectable (ADMELOG) 5 Unit(s) SubCutaneous three times a day before meals  metoprolol succinate ER 50 milliGRAM(s) Oral daily  piperacillin/tazobactam IVPB.. 3.375 Gram(s) IV Intermittent every 8 hours  rosuvastatin 40 milliGRAM(s) Oral at bedtime  sodium chloride 0.9%. 1000 milliLiter(s) IV Continuous <Continuous>  valsartan 320 milliGRAM(s) Oral daily  vancomycin  IVPB 1500 milliGRAM(s) IV Intermittent once    FH,   PMH   PSH    Labs                          10.0   9.42  )-----------( 349      ( 18 Nov 2024 03:56 )             30.2      11-18    137  |  101  |  12.1  ----------------------------<  276[H]  3.9   |  21.0[L]  |  0.93    Ca    8.2[L]      18 Nov 2024 03:56    TPro  8.5  /  Alb  4.0  /  TBili  0.4  /  DBili  x   /  AST  26  /  ALT  28  /  AlkPhos  137[H]  11-17     Vital Signs Last 24 Hrs  T(C): 36.4 (18 Nov 2024 04:50), Max: 38.6 (17 Nov 2024 13:48)  T(F): 97.6 (18 Nov 2024 04:50), Max: 101.5 (17 Nov 2024 13:48)  HR: 86 (18 Nov 2024 04:50) (73 - 94)  BP: 138/74 (18 Nov 2024 04:50) (130/77 - 163/80)  BP(mean): 95 (18 Nov 2024 04:50) (95 - 95)  RR: 18 (18 Nov 2024 04:50) (18 - 18)  SpO2: 100% (18 Nov 2024 04:50) (97% - 100%)    Parameters below as of 18 Nov 2024 04:50  Patient On (Oxygen Delivery Method): room air      Sedimentation Rate, Erythrocyte: 98 mm/hr (11-17-24 @ 05:30)         C-Reactive Protein: 401 mg/L (11-17-24 @ 05:30)   WBC Count: 9.42 K/uL (11-18-24 @ 03:56)    PHYSICAL EXAM  LE Focused:    Vasc:  DP & PT non-palpable b/l. CFT<3 seconds to all 10 digits b/l. TG warm to warm b/l  Derm: Right foot 5th digit wound. Necrotic and fluctuant. Wound probes to bone. Soft tissue crepitus present, increased malodor, 5cc of seropurulent drainage noted upon expression of the wound. Erythema noted spanning from R 5th digit to ankle.   Neuro: Gross and light touch sensation diminshed b/l  MSK: No pain on palpation to foot. Able to move remaining digits.       IMAGING: R foot XR   Suspect pathologic fracture of the fifth proximal phalanx.  Second and third metatarsal bone diaphyseal periostitis.    CULTURES:

## 2024-11-19 LAB
ANION GAP SERPL CALC-SCNC: 13 MMOL/L — SIGNIFICANT CHANGE UP (ref 5–17)
ANION GAP SERPL CALC-SCNC: 16 MMOL/L — SIGNIFICANT CHANGE UP (ref 5–17)
BUN SERPL-MCNC: 13.2 MG/DL — SIGNIFICANT CHANGE UP (ref 8–20)
BUN SERPL-MCNC: 17 MG/DL — SIGNIFICANT CHANGE UP (ref 8–20)
CALCIUM SERPL-MCNC: 8.3 MG/DL — LOW (ref 8.4–10.5)
CALCIUM SERPL-MCNC: 8.5 MG/DL — SIGNIFICANT CHANGE UP (ref 8.4–10.5)
CHLORIDE SERPL-SCNC: 102 MMOL/L — SIGNIFICANT CHANGE UP (ref 96–108)
CHLORIDE SERPL-SCNC: 102 MMOL/L — SIGNIFICANT CHANGE UP (ref 96–108)
CO2 SERPL-SCNC: 19 MMOL/L — LOW (ref 22–29)
CO2 SERPL-SCNC: 23 MMOL/L — SIGNIFICANT CHANGE UP (ref 22–29)
CREAT SERPL-MCNC: 0.87 MG/DL — SIGNIFICANT CHANGE UP (ref 0.5–1.3)
CREAT SERPL-MCNC: 0.96 MG/DL — SIGNIFICANT CHANGE UP (ref 0.5–1.3)
CRP SERPL-MCNC: 239 MG/L — HIGH
CULTURE RESULTS: ABNORMAL
EGFR: 103 ML/MIN/1.73M2 — SIGNIFICANT CHANGE UP
EGFR: 95 ML/MIN/1.73M2 — SIGNIFICANT CHANGE UP
ERYTHROCYTE [SEDIMENTATION RATE] IN BLOOD: 114 MM/HR — HIGH (ref 0–15)
GLUCOSE BLDC GLUCOMTR-MCNC: 291 MG/DL — HIGH (ref 70–99)
GLUCOSE BLDC GLUCOMTR-MCNC: 352 MG/DL — HIGH (ref 70–99)
GLUCOSE BLDC GLUCOMTR-MCNC: 357 MG/DL — HIGH (ref 70–99)
GLUCOSE BLDC GLUCOMTR-MCNC: 418 MG/DL — HIGH (ref 70–99)
GLUCOSE BLDC GLUCOMTR-MCNC: 443 MG/DL — HIGH (ref 70–99)
GLUCOSE BLDC GLUCOMTR-MCNC: 445 MG/DL — HIGH (ref 70–99)
GLUCOSE BLDC GLUCOMTR-MCNC: 469 MG/DL — CRITICAL HIGH (ref 70–99)
GLUCOSE BLDC GLUCOMTR-MCNC: 524 MG/DL — CRITICAL HIGH (ref 70–99)
GLUCOSE BLDC GLUCOMTR-MCNC: 547 MG/DL — CRITICAL HIGH (ref 70–99)
GLUCOSE SERPL-MCNC: 338 MG/DL — HIGH (ref 70–99)
GLUCOSE SERPL-MCNC: 399 MG/DL — HIGH (ref 70–99)
GRAM STN FLD: SIGNIFICANT CHANGE UP
GRAM STN FLD: SIGNIFICANT CHANGE UP
HCT VFR BLD CALC: 30.7 % — LOW (ref 39–50)
HGB BLD-MCNC: 10 G/DL — LOW (ref 13–17)
MCHC RBC-ENTMCNC: 28.7 PG — SIGNIFICANT CHANGE UP (ref 27–34)
MCHC RBC-ENTMCNC: 32.6 G/DL — SIGNIFICANT CHANGE UP (ref 32–36)
MCV RBC AUTO: 88.2 FL — SIGNIFICANT CHANGE UP (ref 80–100)
PLATELET # BLD AUTO: 394 K/UL — SIGNIFICANT CHANGE UP (ref 150–400)
POTASSIUM SERPL-MCNC: 4.2 MMOL/L — SIGNIFICANT CHANGE UP (ref 3.5–5.3)
POTASSIUM SERPL-MCNC: 5 MMOL/L — SIGNIFICANT CHANGE UP (ref 3.5–5.3)
POTASSIUM SERPL-SCNC: 4.2 MMOL/L — SIGNIFICANT CHANGE UP (ref 3.5–5.3)
POTASSIUM SERPL-SCNC: 5 MMOL/L — SIGNIFICANT CHANGE UP (ref 3.5–5.3)
RBC # BLD: 3.48 M/UL — LOW (ref 4.2–5.8)
RBC # FLD: 13.4 % — SIGNIFICANT CHANGE UP (ref 10.3–14.5)
SODIUM SERPL-SCNC: 137 MMOL/L — SIGNIFICANT CHANGE UP (ref 135–145)
SODIUM SERPL-SCNC: 138 MMOL/L — SIGNIFICANT CHANGE UP (ref 135–145)
SPECIMEN SOURCE: SIGNIFICANT CHANGE UP
WBC # BLD: 8.22 K/UL — SIGNIFICANT CHANGE UP (ref 3.8–10.5)
WBC # FLD AUTO: 8.22 K/UL — SIGNIFICANT CHANGE UP (ref 3.8–10.5)

## 2024-11-19 PROCEDURE — 99222 1ST HOSP IP/OBS MODERATE 55: CPT

## 2024-11-19 PROCEDURE — 99233 SBSQ HOSP IP/OBS HIGH 50: CPT

## 2024-11-19 PROCEDURE — 99232 SBSQ HOSP IP/OBS MODERATE 35: CPT | Mod: 24

## 2024-11-19 PROCEDURE — 99223 1ST HOSP IP/OBS HIGH 75: CPT

## 2024-11-19 RX ORDER — INSULIN GLARGINE 100 [IU]/ML
35 INJECTION, SOLUTION SUBCUTANEOUS AT BEDTIME
Refills: 0 | Status: DISCONTINUED | OUTPATIENT
Start: 2024-11-19 | End: 2024-11-21

## 2024-11-19 RX ORDER — VANCOMYCIN HCL 900 MCG/MG
1500 POWDER (GRAM) MISCELLANEOUS EVERY 12 HOURS
Refills: 0 | Status: DISCONTINUED | OUTPATIENT
Start: 2024-11-19 | End: 2024-11-21

## 2024-11-19 RX ORDER — SODIUM CHLORIDE 9 MG/ML
1000 INJECTION, SOLUTION INTRAMUSCULAR; INTRAVENOUS; SUBCUTANEOUS ONCE
Refills: 0 | Status: COMPLETED | OUTPATIENT
Start: 2024-11-19 | End: 2024-11-19

## 2024-11-19 RX ORDER — INSULIN REG, HUM S-S BUFF 100/ML
1 VIAL (ML) INJECTION ONCE
Refills: 0 | Status: COMPLETED | OUTPATIENT
Start: 2024-11-19 | End: 2024-11-19

## 2024-11-19 RX ADMIN — ROSUVASTATIN CALCIUM 40 MILLIGRAM(S): 5 TABLET, FILM COATED ORAL at 22:06

## 2024-11-19 RX ADMIN — ACETAMINOPHEN 500MG 650 MILLIGRAM(S): 500 TABLET, COATED ORAL at 00:20

## 2024-11-19 RX ADMIN — SODIUM CHLORIDE 100 MILLILITER(S): 9 INJECTION, SOLUTION INTRAMUSCULAR; INTRAVENOUS; SUBCUTANEOUS at 08:59

## 2024-11-19 RX ADMIN — ACETAMINOPHEN 500MG 650 MILLIGRAM(S): 500 TABLET, COATED ORAL at 12:14

## 2024-11-19 RX ADMIN — VALSARTAN 320 MILLIGRAM(S): 320 TABLET ORAL at 06:34

## 2024-11-19 RX ADMIN — PIPERACILLIN SODIUM AND TAZOBACTAM SODIUM 25 GRAM(S): 4; .5 INJECTION, POWDER, LYOPHILIZED, FOR SOLUTION INTRAVENOUS at 06:34

## 2024-11-19 RX ADMIN — Medication 5 UNIT(S): at 11:51

## 2024-11-19 RX ADMIN — SODIUM CHLORIDE 1000 MILLILITER(S): 9 INJECTION, SOLUTION INTRAMUSCULAR; INTRAVENOUS; SUBCUTANEOUS at 20:28

## 2024-11-19 RX ADMIN — Medication 300 MILLIGRAM(S): at 22:05

## 2024-11-19 RX ADMIN — Medication 12: at 16:39

## 2024-11-19 RX ADMIN — Medication 10 UNIT(S): at 20:27

## 2024-11-19 RX ADMIN — Medication 12: at 11:51

## 2024-11-19 RX ADMIN — SODIUM CHLORIDE 100 MILLILITER(S): 9 INJECTION, SOLUTION INTRAMUSCULAR; INTRAVENOUS; SUBCUTANEOUS at 22:34

## 2024-11-19 RX ADMIN — ENOXAPARIN SODIUM 40 MILLIGRAM(S): 30 INJECTION SUBCUTANEOUS at 06:33

## 2024-11-19 RX ADMIN — Medication 6: at 07:58

## 2024-11-19 RX ADMIN — Medication 300 MILLIGRAM(S): at 11:52

## 2024-11-19 RX ADMIN — Medication 10 UNIT(S): at 16:39

## 2024-11-19 RX ADMIN — PIPERACILLIN SODIUM AND TAZOBACTAM SODIUM 25 GRAM(S): 4; .5 INJECTION, POWDER, LYOPHILIZED, FOR SOLUTION INTRAVENOUS at 14:55

## 2024-11-19 RX ADMIN — ACETAMINOPHEN 500MG 650 MILLIGRAM(S): 500 TABLET, COATED ORAL at 22:18

## 2024-11-19 RX ADMIN — INSULIN GLARGINE 35 UNIT(S): 100 INJECTION, SOLUTION SUBCUTANEOUS at 22:05

## 2024-11-19 RX ADMIN — METOPROLOL TARTRATE 50 MILLIGRAM(S): 100 TABLET, FILM COATED ORAL at 06:34

## 2024-11-19 RX ADMIN — Medication 5 UNIT(S): at 07:58

## 2024-11-19 RX ADMIN — ACETAMINOPHEN 500MG 650 MILLIGRAM(S): 500 TABLET, COATED ORAL at 23:18

## 2024-11-19 RX ADMIN — ACETAMINOPHEN 500MG 650 MILLIGRAM(S): 500 TABLET, COATED ORAL at 13:14

## 2024-11-19 RX ADMIN — Medication 5 UNIT(S): at 18:22

## 2024-11-19 NOTE — PROGRESS NOTE ADULT - ASSESSMENT
A:  R foot necrotic 5th digit  S/P right partial 4th and 5th ray amputation  11/18    P:   Patient seen and evaluated at bedside and Chart reviewed.  leukocytosis present with vitals stable  Discussed diagnosis and treatment with patient  X-rays evaluated  CT reviewed- results noted above   Venous duplex negative   Culture and Intraop culture results pending   Appreciate ID recommendations  Appreciate vascular recommendations  Surgicell, packing, guaze, ABD, kerlix and ACE applied  Patient to keep the dressing clean, dry and intact  Patient is scheduled to go back to OR on Thursday 11/21 for wound debridement with delay primary closure. Patient is aware and amenable.  Please keep patient medically optimized.   Discussed importance of daily foot examinations and proper shoe gear and to importance of lower Fasting Blood Glucose levels.   Podiatry to follow while in house.  Seen, reviewed, and treated with Dr. Guan   Discussed with Dr. Vidal A:  R foot necrotic 5th digit  S/P right partial 4th and 5th ray amputation  11/18    P:   Patient seen and evaluated at bedside and Chart reviewed.  leukocytosis present with vitals stable  Discussed diagnosis and treatment with patient  X-rays evaluated  CT reviewed- results noted above   Venous duplex negative   Culture and Intraop culture results pending   Appreciate ID recommendations  Appreciate vascular recommendations  Surgicell, packing, guaze, ABD, kerlix and ACE applied  Patient to keep the dressing clean, dry and intact  Patient is scheduled to go back to OR on Thursday 11/21 for wound debridement with delay primary closure. Patient is aware and amenable.  Please keep patient medically optimized.   Patient to be nonweight bearing. Recommend staying in bed with leg elevated at all times until tomorrow AM when attending sees patient due to bleeding from foot.  Discussed importance of daily foot examinations and proper shoe gear and to importance of lower Fasting Blood Glucose levels.   Podiatry to follow while in house.  Seen, reviewed, and treated with Dr. Guan   Discussed with Dr. Vidal

## 2024-11-19 NOTE — CONSULT NOTE ADULT - ASSESSMENT
54M who presented with continued right foot swelling and fifth toe discoloration. Currently being evaluated for foot infection.     Course of the admission was complicate by hyperglycemia, endocrinology was consulted for evaluation   S/P right partial 4th and 5th ray amputation  11/18    DM with hyperglycemia    Home Medication:      A1c 9.1  Increase Lantus 35 nightly   Increase Admelog 10 units TID before meals, hold of NPO   Continue moderate dose Admelog sliding scale TID and bedtime   Monitor POCT   Diabetes education   Diabetic Diet        54M who presented with continued right foot swelling and fifth toe discoloration. Currently being evaluated for foot infection.     Course of the admission was complicate by hyperglycemia, endocrinology was consulted for evaluation   S/P right partial 4th and 5th ray amputation  11/18    DM with hyperglycemia    Home Medication:  Boluses from the last note from Dr. Cross. Novolog 28-28-30 u TID w meals. Unable to know basal insulin     A1c 9.1  Increase Lantus 35 nightly   Increase Admelog 10 units TID before meals, hold of NPO   Continue moderate dose Admelog sliding scale TID and bedtime   Monitor POCT   Diabetes education   Diabetic Diet   Appointment with Dr. Cross 31st

## 2024-11-19 NOTE — PROGRESS NOTE ADULT - SUBJECTIVE AND OBJECTIVE BOX
[facility] Division of Hospital Medicine    INTERVAL HISTORY:  Overnight, no acute events.     Patient seen and examined at bedside this morning sitting in chair. Patient with profuse bleeding in L foot. Denies any foot pain. Patient denies chest pain, SOB, abd pain, N/V, fever, chills, dysuria or any other complaints.      MEDICATIONS  (STANDING):  dextrose 5%. 1000 milliLiter(s) (50 mL/Hr) IV Continuous <Continuous>  dextrose 5%. 1000 milliLiter(s) (50 mL/Hr) IV Continuous <Continuous>  dextrose 5%. 1000 milliLiter(s) (100 mL/Hr) IV Continuous <Continuous>  dextrose 5%. 1000 milliLiter(s) (100 mL/Hr) IV Continuous <Continuous>  dextrose 50% Injectable 25 Gram(s) IV Push once  dextrose 50% Injectable 12.5 Gram(s) IV Push once  dextrose 50% Injectable 25 Gram(s) IV Push once  dextrose 50% Injectable 25 Gram(s) IV Push once  dextrose 50% Injectable 12.5 Gram(s) IV Push once  dextrose 50% Injectable 25 Gram(s) IV Push once  enoxaparin Injectable 40 milliGRAM(s) SubCutaneous every 24 hours  glucagon  Injectable 1 milliGRAM(s) IntraMuscular once  glucagon  Injectable 1 milliGRAM(s) IntraMuscular once  influenza   Vaccine 0.5 milliLiter(s) IntraMuscular once  insulin glargine Injectable (LANTUS) 35 Unit(s) SubCutaneous at bedtime  insulin lispro (ADMELOG) corrective regimen sliding scale   SubCutaneous three times a day before meals  insulin lispro Injectable (ADMELOG) 10 Unit(s) SubCutaneous three times a day before meals  metoprolol succinate ER 50 milliGRAM(s) Oral daily  piperacillin/tazobactam IVPB.. 3.375 Gram(s) IV Intermittent every 8 hours  rosuvastatin 40 milliGRAM(s) Oral at bedtime  sodium chloride 0.9%. 1000 milliLiter(s) (100 mL/Hr) IV Continuous <Continuous>  valsartan 320 milliGRAM(s) Oral daily  vancomycin  IVPB 1500 milliGRAM(s) IV Intermittent every 12 hours    MEDICATIONS  (PRN):  acetaminophen     Tablet .. 650 milliGRAM(s) Oral every 6 hours PRN Temp greater or equal to 38C (100.4F), Mild Pain (1 - 3)  albuterol    0.083% 2.5 milliGRAM(s) Nebulizer every 6 hours PRN Shortness of Breath and/or Wheezing  albuterol    90 MICROgram(s) HFA Inhaler 1 Puff(s) Inhalation every 4 hours PRN Shortness of Breath and/or Wheezing  dextrose Oral Gel 15 Gram(s) Oral once PRN Blood Glucose LESS THAN 70 milliGRAM(s)/deciliter  dextrose Oral Gel 15 Gram(s) Oral once PRN Blood Glucose LESS THAN 70 milliGRAM(s)/deciliter        I&O's Summary    18 Nov 2024 07:01  -  19 Nov 2024 07:00  --------------------------------------------------------  IN: 0 mL / OUT: 600 mL / NET: -600 mL        PHYSICAL EXAM:  Vital Signs Last 24 Hrs  T(C): 36.6 (19 Nov 2024 10:57), Max: 36.9 (18 Nov 2024 20:10)  T(F): 97.8 (19 Nov 2024 10:57), Max: 98.5 (18 Nov 2024 20:10)  HR: 81 (19 Nov 2024 10:57) (79 - 93)  BP: 167/90 (19 Nov 2024 10:57) (114/90 - 174/77)  BP(mean): 99 (18 Nov 2024 18:35) (99 - 99)  RR: 18 (19 Nov 2024 10:57) (10 - 19)  SpO2: 99% (19 Nov 2024 10:57) (95% - 100%)    Parameters below as of 19 Nov 2024 02:22  Patient On (Oxygen Delivery Method): room air        CONSTITUTIONAL: No apparent distress  HEENT: Normocephalic, Atraumatic.   RESPIRATORY:  lungs are clear to auscultation bilaterally, No crackles, rhonchi, wheezes  CARDIOVASCULAR: Regular rate and rhythm  EXT: +LLE edema, L foot wrapped and bleeding through dressing  ABDOMEN: Soft, non-distended, nontender to palpation, +BS  MUSCLOSKELETAL: moving all 4 extremities spontaneously  PSYCH: thoughts linear, affect appropriate  NEUROLOGY: Alert, Oriented x3    LABS:                        10.0   8.22  )-----------( 394      ( 19 Nov 2024 03:58 )             30.7     11-19    137  |  102  |  13.2  ----------------------------<  338[H]  5.0   |  19.0[L]  |  0.87    Ca    8.3[L]      19 Nov 2024 03:58      PT/INR - ( 17 Nov 2024 15:00 )   PT: 14.2 sec;   INR: 1.23 ratio         PTT - ( 17 Nov 2024 15:00 )  PTT:31.0 sec      Urinalysis Basic - ( 19 Nov 2024 03:58 )    Color: x / Appearance: x / SG: x / pH: x  Gluc: 338 mg/dL / Ketone: x  / Bili: x / Urobili: x   Blood: x / Protein: x / Nitrite: x   Leuk Esterase: x / RBC: x / WBC x   Sq Epi: x / Non Sq Epi: x / Bacteria: x        Culture - Tissue with Gram Stain (collected 18 Nov 2024 21:20)  Source: Tissue  Gram Stain (19 Nov 2024 06:14):    No polymorphonuclear cells seen per low power field    No organisms seen per oil power field    Culture - Tissue with Gram Stain (collected 18 Nov 2024 21:19)  Source: Tissue  Gram Stain (19 Nov 2024 06:14):    No polymorphonuclear cells seen per low power field    No organisms seen per oil power field    Culture - Wound Aerobic/Anaerobic (collected 17 Nov 2024 13:05)  Source: Skin/Wound  Preliminary Report (18 Nov 2024 22:05):    Moderate Streptococcus constellatus    "Susceptibilities not performed"    Culture - Blood (collected 17 Nov 2024 05:30)  Source: .Blood BLOOD  Preliminary Report (19 Nov 2024 09:01):    No growth at 48 Hours      CAPILLARY BLOOD GLUCOSE      POCT Blood Glucose.: 418 mg/dL (19 Nov 2024 11:47)  POCT Blood Glucose.: 291 mg/dL (19 Nov 2024 07:56)  POCT Blood Glucose.: 403 mg/dL (18 Nov 2024 23:10)  POCT Blood Glucose.: 337 mg/dL (18 Nov 2024 21:32)  POCT Blood Glucose.: 264 mg/dL (18 Nov 2024 18:24)  POCT Blood Glucose.: 244 mg/dL (18 Nov 2024 17:21)  POCT Blood Glucose.: 252 mg/dL (18 Nov 2024 15:52)        RADIOLOGY & ADDITIONAL TESTS:  Results Reviewed

## 2024-11-19 NOTE — PROGRESS NOTE ADULT - ASSESSMENT
54M who presented with continued right foot swelling and fifth toe discoloration. The patient reported that he had first noted a blister to the fifth toe two weeks prior and was evaluated by his primary care physician and podiatrist without further intervention. The patient reported that the blister opened with drainage of fluid. He denied any pain but reported that he had decreased sensation due to neuropathy. He did not have any fevers or chills but noted a malodorous odor from the foot.. He continued to put a dressing on the toe but the discoloration to the toe and swelling to the ankle did not improve. He denied any recent injury to the toe and denied any similar findings on the other toes.  AS ABOVE WITH NECROTIC CHANGES TO FOOT AS PER PHOTO OF PT  PT WITH FOUL SMELLING FOOT  AND WAS CATHY TO THE OR YESTERDAY 11/18  PT W/P 4TH AND 5TH TOE Amputation  PUS NOTED GANGRENOUS TOE REMOVED   WILL CONTINUE ZOSYN/JOE     PT WITH OOZING BLOOD ON ACE BANDAGE  PODIATRY CALLED TO REEVALUATE PRESSURE DRESSING  WILL FOLLOW UP

## 2024-11-19 NOTE — PROGRESS NOTE ADULT - ASSESSMENT
54yoM with PMHx of hypertension, hyperlipidemia, diabetes, and asthma with neuropathy, who presented with a two week history of right toe discoloration and leg swelling with malodorous odor.    #Sepsis / Cellulitis  - s/p I&D with podiatry   - Xray of the foot: Diffuse soft tissue swelling with subcutaneous air. Suspect pathologic fracture of the fifth proximal phalanx. Second and third metatarsal bone diaphyseal periostitis. Pes planus. Suspect anterior talus have fracture.  - s/p right foot partial 4th and 5th ray resection, left open  - zosyn, vanc   - Blood culture pending   - LE duplex to r/o DVT: negative   - ELLIE, adequate flow in both LE   - scheduled for OR 11/21 for wound debridement with delay primary closure   - nonweight bearing, elevating leg until tomorrow am    #Diabetes  - Metformin to be held  - holding insulin pump  - sugars in 400s  - endocrine consulted, increased lantus 35units qhs and admelog 10units tid w/ sliding scale   - continue to monitor     #Hypertension  - Close blood pressure monitoring  - On metoprolol and valsartan    #Anemia  - The patient reported a history of anemia in the past for which he was taking B12 and multivitamin  - Monitoring hemoglobin levels    #OMID, likely pre-renal   - Cr 1.36, improved to 0.93 w/ IVF     Dispo: medically acute, pending possible amputation, on iv abx

## 2024-11-19 NOTE — PROGRESS NOTE ADULT - SUBJECTIVE AND OBJECTIVE BOX
Interval: Patient was seen resting in bed. Patient s/p right foot partial 4th and 5th ray resection. Patient was satisfied with procedure as his swelling has gone down in his leg as well as his pain levels.     HPI:  A 54-year-old male with a history of neuropathy and recurrent foot ulcers presents with right foot swelling and fifth toe discoloration. He developed a blister on the fifth toe on November 8th, which subsequently ruptured with fluid drainage. Despite regular dressing changes, the discoloration and ankle swelling persisted. He denies pain, fever, or chills but reports a malodorous odor from the foot. Prior evaluations by his PCP and podiatrist resulted in no intervention. He denied any recent injury to the toe and denied any similar findings on the other toes.    PMH: DM, HTN, HLD, Asthma  PSH: Denied  Family History: Parents with diabetes (17 Nov 2024 07:44)    PMH:  Allergies: No Known Allergies    Medications acetaminophen     Tablet .. 650 milliGRAM(s) Oral every 6 hours PRN  albuterol    0.083% 2.5 milliGRAM(s) Nebulizer every 6 hours PRN  albuterol    90 MICROgram(s) HFA Inhaler 1 Puff(s) Inhalation every 4 hours PRN  dextrose 5%. 1000 milliLiter(s) IV Continuous <Continuous>  dextrose 5%. 1000 milliLiter(s) IV Continuous <Continuous>  dextrose 5%. 1000 milliLiter(s) IV Continuous <Continuous>  dextrose 5%. 1000 milliLiter(s) IV Continuous <Continuous>  dextrose 50% Injectable 25 Gram(s) IV Push once  dextrose 50% Injectable 12.5 Gram(s) IV Push once  dextrose 50% Injectable 25 Gram(s) IV Push once  dextrose 50% Injectable 25 Gram(s) IV Push once  dextrose 50% Injectable 12.5 Gram(s) IV Push once  dextrose 50% Injectable 25 Gram(s) IV Push once  dextrose Oral Gel 15 Gram(s) Oral once PRN  dextrose Oral Gel 15 Gram(s) Oral once PRN  enoxaparin Injectable 40 milliGRAM(s) SubCutaneous every 24 hours  glucagon  Injectable 1 milliGRAM(s) IntraMuscular once  glucagon  Injectable 1 milliGRAM(s) IntraMuscular once  influenza   Vaccine 0.5 milliLiter(s) IntraMuscular once  insulin glargine Injectable (LANTUS) 30 Unit(s) SubCutaneous at bedtime  insulin lispro (ADMELOG) corrective regimen sliding scale   SubCutaneous three times a day before meals  insulin lispro Injectable (ADMELOG) 5 Unit(s) SubCutaneous three times a day before meals  metoprolol succinate ER 50 milliGRAM(s) Oral daily  piperacillin/tazobactam IVPB.. 3.375 Gram(s) IV Intermittent every 8 hours  rosuvastatin 40 milliGRAM(s) Oral at bedtime  sodium chloride 0.9%. 1000 milliLiter(s) IV Continuous <Continuous>  valsartan 320 milliGRAM(s) Oral daily  vancomycin  IVPB 1500 milliGRAM(s) IV Intermittent every 12 hours    FH,   PMH   PSH    Labs                          10.0   8.22  )-----------( 394      ( 19 Nov 2024 03:58 )             30.7      11-19    137  |  102  |  13.2  ----------------------------<  338[H]  5.0   |  19.0[L]  |  0.87    Ca    8.3[L]      19 Nov 2024 03:58       Vital Signs Last 24 Hrs  T(C): 36.6 (19 Nov 2024 05:16), Max: 36.9 (18 Nov 2024 20:10)  T(F): 97.9 (19 Nov 2024 05:16), Max: 98.5 (18 Nov 2024 20:10)  HR: 79 (19 Nov 2024 05:16) (79 - 93)  BP: 156/91 (19 Nov 2024 07:01) (114/90 - 174/77)  BP(mean): 99 (18 Nov 2024 18:35) (99 - 99)  RR: 18 (19 Nov 2024 05:16) (10 - 19)  SpO2: 96% (19 Nov 2024 05:16) (95% - 100%)    Parameters below as of 19 Nov 2024 02:22  Patient On (Oxygen Delivery Method): room air      Sedimentation Rate, Erythrocyte: 114 mm/hr (11-19-24 @ 03:58)  Sedimentation Rate, Erythrocyte: 98 mm/hr (11-17-24 @ 05:30)         C-Reactive Protein: 239 mg/L (11-19-24 @ 03:58)  C-Reactive Protein: 401 mg/L (11-17-24 @ 05:30)   WBC Count: 8.22 K/uL (11-19-24 @ 03:58)    PHYSICAL EXAM  LE Focused:    Vasc:  DP & PT non-palpable b/l. CFT<3 seconds to all 10 digits b/l. TG warm to warm b/l  Derm: Right foot 5th digit wound. Necrotic and fluctuant. Wound probes to bone. Soft tissue crepitus present, increased malodor, 5cc of seropurulent drainage noted upon expression of the wound. Erythema noted spanning from R 5th digit to ankle.   Neuro: Gross and light touch sensation diminshed b/l  MSK: No pain on palpation to foot. Able to move remaining digits.       IMAGING:< from: CT Lower Extremity No Cont, Right (11.17.24 @ 23:42) >  IMPRESSION:  1.  Osseous destruction is again seen around the 5th proximal   interphalangeal joint with intraosseous gas consistent with emphysematous   osteomyelitis.  2.  Surrounding soft tissue gas is present with soft tissue wound   extending along the dorsum of the 4th webspace to the level of the 4th   and 5th metatarsal necks with presumed packing material versus complex   gas.  3.  Changes of the ankle and midfoot suggest early Charcot arthropathy.  4.  Right inguinal/groin adenopathy.    < end of copied text >      CULTURES: pending

## 2024-11-19 NOTE — PROGRESS NOTE ADULT - SUBJECTIVE AND OBJECTIVE BOX
INFECTIOUS DISEASES AND INTERNAL MEDICINE at [facility]  =======================================================  Chris Voss MD  Diplomates [employer] Board of Internal Medicine and Infectious Diseases  Telephone [phone]  Fax            [phone]  =======================================================    JANIS LEON [id number]    Follow up: right foot diabetic foot osteo    Allergies:  No Known Allergies      Medications:  acetaminophen     Tablet .. 650 milliGRAM(s) Oral every 6 hours PRN  albuterol    0.083% 2.5 milliGRAM(s) Nebulizer every 6 hours PRN  albuterol    90 MICROgram(s) HFA Inhaler 1 Puff(s) Inhalation every 4 hours PRN  dextrose 5%. 1000 milliLiter(s) IV Continuous <Continuous>  dextrose 5%. 1000 milliLiter(s) IV Continuous <Continuous>  dextrose 5%. 1000 milliLiter(s) IV Continuous <Continuous>  dextrose 5%. 1000 milliLiter(s) IV Continuous <Continuous>  dextrose 50% Injectable 25 Gram(s) IV Push once  dextrose 50% Injectable 12.5 Gram(s) IV Push once  dextrose 50% Injectable 25 Gram(s) IV Push once  dextrose 50% Injectable 25 Gram(s) IV Push once  dextrose 50% Injectable 12.5 Gram(s) IV Push once  dextrose 50% Injectable 25 Gram(s) IV Push once  dextrose Oral Gel 15 Gram(s) Oral once PRN  dextrose Oral Gel 15 Gram(s) Oral once PRN  enoxaparin Injectable 40 milliGRAM(s) SubCutaneous every 24 hours  glucagon  Injectable 1 milliGRAM(s) IntraMuscular once  glucagon  Injectable 1 milliGRAM(s) IntraMuscular once  influenza   Vaccine 0.5 milliLiter(s) IntraMuscular once  insulin glargine Injectable (LANTUS) 30 Unit(s) SubCutaneous at bedtime  insulin lispro (ADMELOG) corrective regimen sliding scale   SubCutaneous three times a day before meals  insulin lispro Injectable (ADMELOG) 5 Unit(s) SubCutaneous three times a day before meals  metoprolol succinate ER 50 milliGRAM(s) Oral daily  piperacillin/tazobactam IVPB.. 3.375 Gram(s) IV Intermittent every 8 hours  rosuvastatin 40 milliGRAM(s) Oral at bedtime  sodium chloride 0.9%. 1000 milliLiter(s) IV Continuous <Continuous>  valsartan 320 milliGRAM(s) Oral daily  vancomycin  IVPB 1500 milliGRAM(s) IV Intermittent every 12 hours    SOCIAL       FAMILY   FAMILY HISTORY:    REVIEW OF SYSTEMS:  CONSTITUTIONAL:  No Fever or chills  HEENT:   No diplopia or blurred vision.  No earache, sore throat or runny nose.  CARDIOVASCULAR:  No pressure, squeezing, strangling, tightness, heaviness or aching about the chest, neck, axilla or epigastrium.  RESPIRATORY:  No cough, shortness of breath, PND or orthopnea.  GASTROINTESTINAL:  No nausea, vomiting or diarrhea.  GENITOURINARY:  No dysuria, frequency or urgency. No Blood in urine  MUSCULOSKELETAL:   aS   Per HPI  NEUROLOGIC:  No paresthesias, fasciculations, seizures or weakness.  PSYCHIATRIC:  No disorder of thought or mood.  ENDOCRINE:  No heat or cold intolerance, polyuria or polydipsia.  HEMATOLOGICAL:  No easy bruising or bleeding.            Physical Exam:  ICU Vital Signs Last 24 Hrs  T(C): 36.6 (19 Nov 2024 05:16), Max: 36.9 (18 Nov 2024 20:10)  T(F): 97.9 (19 Nov 2024 05:16), Max: 98.5 (18 Nov 2024 20:10)  HR: 79 (19 Nov 2024 05:16) (79 - 93)  BP: 156/91 (19 Nov 2024 07:01) (114/90 - 174/77)  BP(mean): 99 (18 Nov 2024 18:35) (99 - 99)  ABP: --  ABP(mean): --  RR: 18 (19 Nov 2024 05:16) (10 - 19)  SpO2: 96% (19 Nov 2024 05:16) (95% - 100%)    O2 Parameters below as of 19 Nov 2024 02:22  Patient On (Oxygen Delivery Method): room air          GEN: NAD,   HEENT: normocephalic and atraumatic. EOMI. CARTER.    NECK: Supple. No carotid bruits.  No lymphadenopathy or thyromegaly.  LUNGS: Clear to auscultation.  HEART: Regular rate and rhythm without murmur.  ABDOMEN: Soft, nontender, and nondistended.  Positive bowel sounds.    : No CVA tenderness  EXTREMITIES: RIGHT FOOT WRAPPED WITH ACE BANDAGE WITH SOME BLOOD OOZING THROUGH   MSK: no joint swelling  NEUROLOGIC: Cranial nerves II through XII are grossly intact.  PSYCHIATRIC: Appropriate affect .  SKIN: No ulceration or induration present.        Labs:  Vitals:  ============  T(F): 97.9 (19 Nov 2024 05:16), Max: 98.5 (18 Nov 2024 20:10)  HR: 79 (19 Nov 2024 05:16)  BP: 156/91 (19 Nov 2024 07:01)  RR: 18 (19 Nov 2024 05:16)  SpO2: 96% (19 Nov 2024 05:16) (95% - 100%)  temp max in last 48H T(F): , Max: 101.5 (11-17-24 @ 13:48)    =======================================================  Current Antibiotics:  piperacillin/tazobactam IVPB.. 3.375 Gram(s) IV Intermittent every 8 hours  vancomycin  IVPB 1500 milliGRAM(s) IV Intermittent every 12 hours    Other medications:  dextrose 5%. 1000 milliLiter(s) IV Continuous <Continuous>  dextrose 5%. 1000 milliLiter(s) IV Continuous <Continuous>  dextrose 5%. 1000 milliLiter(s) IV Continuous <Continuous>  dextrose 5%. 1000 milliLiter(s) IV Continuous <Continuous>  dextrose 50% Injectable 25 Gram(s) IV Push once  dextrose 50% Injectable 12.5 Gram(s) IV Push once  dextrose 50% Injectable 25 Gram(s) IV Push once  dextrose 50% Injectable 25 Gram(s) IV Push once  dextrose 50% Injectable 12.5 Gram(s) IV Push once  dextrose 50% Injectable 25 Gram(s) IV Push once  enoxaparin Injectable 40 milliGRAM(s) SubCutaneous every 24 hours  glucagon  Injectable 1 milliGRAM(s) IntraMuscular once  glucagon  Injectable 1 milliGRAM(s) IntraMuscular once  influenza   Vaccine 0.5 milliLiter(s) IntraMuscular once  insulin glargine Injectable (LANTUS) 30 Unit(s) SubCutaneous at bedtime  insulin lispro (ADMELOG) corrective regimen sliding scale   SubCutaneous three times a day before meals  insulin lispro Injectable (ADMELOG) 5 Unit(s) SubCutaneous three times a day before meals  metoprolol succinate ER 50 milliGRAM(s) Oral daily  rosuvastatin 40 milliGRAM(s) Oral at bedtime  sodium chloride 0.9%. 1000 milliLiter(s) IV Continuous <Continuous>  valsartan 320 milliGRAM(s) Oral daily      =======================================================  Labs:                        10.0   8.22  )-----------( 394      ( 19 Nov 2024 03:58 )             30.7     11-19    137  |  102  |  13.2  ----------------------------<  338[H]  5.0   |  19.0[L]  |  0.87    Ca    8.3[L]      19 Nov 2024 03:58        Culture - Tissue with Gram Stain (collected 11-18-24 @ 21:20)  Source: Tissue  Gram Stain (11-19-24 @ 06:14):    No polymorphonuclear cells seen per low power field    No organisms seen per oil power field    Culture - Tissue with Gram Stain (collected 11-18-24 @ 21:19)  Source: Tissue  Gram Stain (11-19-24 @ 06:14):    No polymorphonuclear cells seen per low power field    No organisms seen per oil power field    Culture - Wound Aerobic/Anaerobic (collected 11-17-24 @ 13:05)  Source: Skin/Wound  Preliminary Report (11-18-24 @ 22:05):    Moderate Streptococcus constellatus    "Susceptibilities not performed"    Culture - Blood (collected 11-17-24 @ 05:30)  Source: .Blood BLOOD  Preliminary Report (11-19-24 @ 09:01):    No growth at 48 Hours      Creatinine: 0.87 mg/dL (11-19-24 @ 03:58)  Creatinine: 0.93 mg/dL (11-18-24 @ 03:56)  Creatinine: 1.36 mg/dL (11-17-24 @ 05:30)    Procalcitonin: 0.28 ng/mL (11-17-24 @ 15:00)        C-Reactive Protein: 239 mg/L (11-19-24 @ 03:58)  C-Reactive Protein: 401 mg/L (11-17-24 @ 05:30)    WBC Count: 8.22 K/uL (11-19-24 @ 03:58)  WBC Count: 9.42 K/uL (11-18-24 @ 03:56)  WBC Count: 13.22 K/uL (11-17-24 @ 05:30)        Alkaline Phosphatase: 137 U/L (11-17-24 @ 05:30)  Alanine Aminotransferase (ALT/SGPT): 28 U/L (11-17-24 @ 05:30)  Aspartate Aminotransferase (AST/SGOT): 26 U/L (11-17-24 @ 05:30)  Bilirubin Total: 0.4 mg/dL (11-17-24 @ 05:30)

## 2024-11-19 NOTE — CONSULT NOTE ADULT - SUBJECTIVE AND OBJECTIVE BOX
HPI:  54M who presented with continued right foot swelling and fifth toe discoloration. Currently being evaluated for foot infection.     Course of the admission was complicate by hyperglycemia, endocrinology was consulted for evaluation         PMH: DM, HTN, HLD, Asthma  PSH: Denied  Family History: Parents with diabetes (17 Nov 2024 07:44)      PAST MEDICAL & SURGICAL HISTORY:      Social History:  Denied tobacco use  Occasional alcohol consumption (17 Nov 2024 07:44)      FAMILY HISTORY:        Allergies    No Known Allergies    Intolerances        ROS as noted in the HPI    MEDICATIONS  (STANDING):  dextrose 5%. 1000 milliLiter(s) (50 mL/Hr) IV Continuous <Continuous>  dextrose 5%. 1000 milliLiter(s) (50 mL/Hr) IV Continuous <Continuous>  dextrose 5%. 1000 milliLiter(s) (100 mL/Hr) IV Continuous <Continuous>  dextrose 5%. 1000 milliLiter(s) (100 mL/Hr) IV Continuous <Continuous>  dextrose 50% Injectable 25 Gram(s) IV Push once  dextrose 50% Injectable 12.5 Gram(s) IV Push once  dextrose 50% Injectable 25 Gram(s) IV Push once  dextrose 50% Injectable 25 Gram(s) IV Push once  dextrose 50% Injectable 12.5 Gram(s) IV Push once  dextrose 50% Injectable 25 Gram(s) IV Push once  enoxaparin Injectable 40 milliGRAM(s) SubCutaneous every 24 hours  glucagon  Injectable 1 milliGRAM(s) IntraMuscular once  glucagon  Injectable 1 milliGRAM(s) IntraMuscular once  influenza   Vaccine 0.5 milliLiter(s) IntraMuscular once  insulin glargine Injectable (LANTUS) 35 Unit(s) SubCutaneous at bedtime  insulin lispro (ADMELOG) corrective regimen sliding scale   SubCutaneous three times a day before meals  insulin lispro Injectable (ADMELOG) 10 Unit(s) SubCutaneous three times a day before meals  metoprolol succinate ER 50 milliGRAM(s) Oral daily  piperacillin/tazobactam IVPB.. 3.375 Gram(s) IV Intermittent every 8 hours  rosuvastatin 40 milliGRAM(s) Oral at bedtime  sodium chloride 0.9%. 1000 milliLiter(s) (100 mL/Hr) IV Continuous <Continuous>  valsartan 320 milliGRAM(s) Oral daily  vancomycin  IVPB 1500 milliGRAM(s) IV Intermittent every 12 hours    MEDICATIONS  (PRN):  acetaminophen     Tablet .. 650 milliGRAM(s) Oral every 6 hours PRN Temp greater or equal to 38C (100.4F), Mild Pain (1 - 3)  albuterol    0.083% 2.5 milliGRAM(s) Nebulizer every 6 hours PRN Shortness of Breath and/or Wheezing  albuterol    90 MICROgram(s) HFA Inhaler 1 Puff(s) Inhalation every 4 hours PRN Shortness of Breath and/or Wheezing  dextrose Oral Gel 15 Gram(s) Oral once PRN Blood Glucose LESS THAN 70 milliGRAM(s)/deciliter  dextrose Oral Gel 15 Gram(s) Oral once PRN Blood Glucose LESS THAN 70 milliGRAM(s)/deciliter      Vital Signs Last 24 Hrs  T(C): 36.6 (19 Nov 2024 10:57), Max: 36.9 (18 Nov 2024 20:10)  T(F): 97.8 (19 Nov 2024 10:57), Max: 98.5 (18 Nov 2024 20:10)  HR: 81 (19 Nov 2024 10:57) (79 - 93)  BP: 167/90 (19 Nov 2024 10:57) (114/90 - 174/77)  BP(mean): 99 (18 Nov 2024 18:35) (99 - 99)  RR: 18 (19 Nov 2024 10:57) (10 - 19)  SpO2: 99% (19 Nov 2024 10:57) (95% - 100%)    Parameters below as of 19 Nov 2024 02:22  Patient On (Oxygen Delivery Method): room air      Height (cm): 188 (11-18-24 @ 15:54)  Weight (kg): 141 (11-18-24 @ 15:54)  BMI (kg/m2): 39.9 (11-18-24 @ 15:54)  BSA (m2): 2.62 (11-18-24 @ 15:54)    Physical Exam:    Constitutional: NAD, well-developed    Respiratory: CTAB, normal respirations  Cardiovascular: S1 and S2, RRR  Psychiatric: Normal mood and normal affect  Skin: no rashes, no acanthosis    LABS  11-19    137  |  102  |  13.2  ----------------------------<  338[H]  5.0   |  19.0[L]  |  0.87    Ca    8.3[L]      19 Nov 2024 03:58                            10.0   8.22  )-----------( 394      ( 19 Nov 2024 03:58 )             30.7       A1C with Estimated Average Glucose Result: 9.1 % (11-18-24 @ 03:56)  A1C with Estimated Average Glucose Result: 8.8 % (11-17-24 @ 08:40)                  CAPILLARY BLOOD GLUCOSE      POCT Blood Glucose.: 418 mg/dL (19 Nov 2024 11:47)  POCT Blood Glucose.: 291 mg/dL (19 Nov 2024 07:56)  POCT Blood Glucose.: 403 mg/dL (18 Nov 2024 23:10)  POCT Blood Glucose.: 337 mg/dL (18 Nov 2024 21:32)  POCT Blood Glucose.: 264 mg/dL (18 Nov 2024 18:24)  POCT Blood Glucose.: 244 mg/dL (18 Nov 2024 17:21)  POCT Blood Glucose.: 252 mg/dL (18 Nov 2024 15:52)      Imaging       HPI:  54M who presented with continued right foot swelling and fifth toe discoloration. Currently being evaluated for foot infection.     Course of the admission was complicate by hyperglycemia, endocrinology was consulted for evaluation     Has tandem T slim and following with Dr. Cross, next appointment 12/31st     Boluses from the last note from Dr. Cross. Novolog 28-28-30 u TID w meals. Unable to know basal insulin       PMH: DM, HTN, HLD, Asthma  PSH: Denied  Family History: Parents with diabetes (17 Nov 2024 07:44)      PAST MEDICAL & SURGICAL HISTORY:      Social History:  Denied tobacco use  Occasional alcohol consumption (17 Nov 2024 07:44)      FAMILY HISTORY:        Allergies    No Known Allergies    Intolerances        ROS as noted in the HPI    MEDICATIONS  (STANDING):  dextrose 5%. 1000 milliLiter(s) (50 mL/Hr) IV Continuous <Continuous>  dextrose 5%. 1000 milliLiter(s) (50 mL/Hr) IV Continuous <Continuous>  dextrose 5%. 1000 milliLiter(s) (100 mL/Hr) IV Continuous <Continuous>  dextrose 5%. 1000 milliLiter(s) (100 mL/Hr) IV Continuous <Continuous>  dextrose 50% Injectable 25 Gram(s) IV Push once  dextrose 50% Injectable 12.5 Gram(s) IV Push once  dextrose 50% Injectable 25 Gram(s) IV Push once  dextrose 50% Injectable 25 Gram(s) IV Push once  dextrose 50% Injectable 12.5 Gram(s) IV Push once  dextrose 50% Injectable 25 Gram(s) IV Push once  enoxaparin Injectable 40 milliGRAM(s) SubCutaneous every 24 hours  glucagon  Injectable 1 milliGRAM(s) IntraMuscular once  glucagon  Injectable 1 milliGRAM(s) IntraMuscular once  influenza   Vaccine 0.5 milliLiter(s) IntraMuscular once  insulin glargine Injectable (LANTUS) 35 Unit(s) SubCutaneous at bedtime  insulin lispro (ADMELOG) corrective regimen sliding scale   SubCutaneous three times a day before meals  insulin lispro Injectable (ADMELOG) 10 Unit(s) SubCutaneous three times a day before meals  metoprolol succinate ER 50 milliGRAM(s) Oral daily  piperacillin/tazobactam IVPB.. 3.375 Gram(s) IV Intermittent every 8 hours  rosuvastatin 40 milliGRAM(s) Oral at bedtime  sodium chloride 0.9%. 1000 milliLiter(s) (100 mL/Hr) IV Continuous <Continuous>  valsartan 320 milliGRAM(s) Oral daily  vancomycin  IVPB 1500 milliGRAM(s) IV Intermittent every 12 hours    MEDICATIONS  (PRN):  acetaminophen     Tablet .. 650 milliGRAM(s) Oral every 6 hours PRN Temp greater or equal to 38C (100.4F), Mild Pain (1 - 3)  albuterol    0.083% 2.5 milliGRAM(s) Nebulizer every 6 hours PRN Shortness of Breath and/or Wheezing  albuterol    90 MICROgram(s) HFA Inhaler 1 Puff(s) Inhalation every 4 hours PRN Shortness of Breath and/or Wheezing  dextrose Oral Gel 15 Gram(s) Oral once PRN Blood Glucose LESS THAN 70 milliGRAM(s)/deciliter  dextrose Oral Gel 15 Gram(s) Oral once PRN Blood Glucose LESS THAN 70 milliGRAM(s)/deciliter      Vital Signs Last 24 Hrs  T(C): 36.6 (19 Nov 2024 10:57), Max: 36.9 (18 Nov 2024 20:10)  T(F): 97.8 (19 Nov 2024 10:57), Max: 98.5 (18 Nov 2024 20:10)  HR: 81 (19 Nov 2024 10:57) (79 - 93)  BP: 167/90 (19 Nov 2024 10:57) (114/90 - 174/77)  BP(mean): 99 (18 Nov 2024 18:35) (99 - 99)  RR: 18 (19 Nov 2024 10:57) (10 - 19)  SpO2: 99% (19 Nov 2024 10:57) (95% - 100%)    Parameters below as of 19 Nov 2024 02:22  Patient On (Oxygen Delivery Method): room air      Height (cm): 188 (11-18-24 @ 15:54)  Weight (kg): 141 (11-18-24 @ 15:54)  BMI (kg/m2): 39.9 (11-18-24 @ 15:54)  BSA (m2): 2.62 (11-18-24 @ 15:54)    Physical Exam:    Constitutional: NAD, well-developed    Respiratory: CTAB, normal respirations  Cardiovascular: S1 and S2, RRR  Psychiatric: Normal mood and normal affect  Skin: no rashes, no acanthosis    LABS  11-19    137  |  102  |  13.2  ----------------------------<  338[H]  5.0   |  19.0[L]  |  0.87    Ca    8.3[L]      19 Nov 2024 03:58                            10.0   8.22  )-----------( 394      ( 19 Nov 2024 03:58 )             30.7       A1C with Estimated Average Glucose Result: 9.1 % (11-18-24 @ 03:56)  A1C with Estimated Average Glucose Result: 8.8 % (11-17-24 @ 08:40)                  CAPILLARY BLOOD GLUCOSE      POCT Blood Glucose.: 418 mg/dL (19 Nov 2024 11:47)  POCT Blood Glucose.: 291 mg/dL (19 Nov 2024 07:56)  POCT Blood Glucose.: 403 mg/dL (18 Nov 2024 23:10)  POCT Blood Glucose.: 337 mg/dL (18 Nov 2024 21:32)  POCT Blood Glucose.: 264 mg/dL (18 Nov 2024 18:24)  POCT Blood Glucose.: 244 mg/dL (18 Nov 2024 17:21)  POCT Blood Glucose.: 252 mg/dL (18 Nov 2024 15:52)      Imaging

## 2024-11-20 LAB
ANION GAP SERPL CALC-SCNC: 17 MMOL/L — SIGNIFICANT CHANGE UP (ref 5–17)
B-OH-BUTYR SERPL-SCNC: 0.1 MMOL/L — SIGNIFICANT CHANGE UP
BUN SERPL-MCNC: 12.5 MG/DL — SIGNIFICANT CHANGE UP (ref 8–20)
CALCIUM SERPL-MCNC: 8.2 MG/DL — LOW (ref 8.4–10.5)
CHLORIDE SERPL-SCNC: 103 MMOL/L — SIGNIFICANT CHANGE UP (ref 96–108)
CO2 SERPL-SCNC: 17 MMOL/L — LOW (ref 22–29)
CREAT SERPL-MCNC: 0.76 MG/DL — SIGNIFICANT CHANGE UP (ref 0.5–1.3)
EGFR: 107 ML/MIN/1.73M2 — SIGNIFICANT CHANGE UP
GLUCOSE BLDC GLUCOMTR-MCNC: 267 MG/DL — HIGH (ref 70–99)
GLUCOSE BLDC GLUCOMTR-MCNC: 272 MG/DL — HIGH (ref 70–99)
GLUCOSE BLDC GLUCOMTR-MCNC: 293 MG/DL — HIGH (ref 70–99)
GLUCOSE BLDC GLUCOMTR-MCNC: 298 MG/DL — HIGH (ref 70–99)
GLUCOSE BLDC GLUCOMTR-MCNC: 328 MG/DL — HIGH (ref 70–99)
GLUCOSE BLDC GLUCOMTR-MCNC: 366 MG/DL — HIGH (ref 70–99)
GLUCOSE BLDC GLUCOMTR-MCNC: 434 MG/DL — HIGH (ref 70–99)
GLUCOSE SERPL-MCNC: 245 MG/DL — HIGH (ref 70–99)
HCT VFR BLD CALC: 24.9 % — LOW (ref 39–50)
HGB BLD-MCNC: 8 G/DL — LOW (ref 13–17)
MAGNESIUM SERPL-MCNC: 1.8 MG/DL — SIGNIFICANT CHANGE UP (ref 1.6–2.6)
MCHC RBC-ENTMCNC: 28.8 PG — SIGNIFICANT CHANGE UP (ref 27–34)
MCHC RBC-ENTMCNC: 32.1 G/DL — SIGNIFICANT CHANGE UP (ref 32–36)
MCV RBC AUTO: 89.6 FL — SIGNIFICANT CHANGE UP (ref 80–100)
PHOSPHATE SERPL-MCNC: 2.9 MG/DL — SIGNIFICANT CHANGE UP (ref 2.4–4.7)
PLATELET # BLD AUTO: 315 K/UL — SIGNIFICANT CHANGE UP (ref 150–400)
POTASSIUM SERPL-MCNC: 4.3 MMOL/L — SIGNIFICANT CHANGE UP (ref 3.5–5.3)
POTASSIUM SERPL-SCNC: 4.3 MMOL/L — SIGNIFICANT CHANGE UP (ref 3.5–5.3)
RBC # BLD: 2.78 M/UL — LOW (ref 4.2–5.8)
RBC # FLD: 13.2 % — SIGNIFICANT CHANGE UP (ref 10.3–14.5)
SODIUM SERPL-SCNC: 137 MMOL/L — SIGNIFICANT CHANGE UP (ref 135–145)
VANCOMYCIN TROUGH SERPL-MCNC: 15.6 UG/ML — SIGNIFICANT CHANGE UP (ref 10–20)
WBC # BLD: 7.44 K/UL — SIGNIFICANT CHANGE UP (ref 3.8–10.5)
WBC # FLD AUTO: 7.44 K/UL — SIGNIFICANT CHANGE UP (ref 3.8–10.5)

## 2024-11-20 PROCEDURE — 99232 SBSQ HOSP IP/OBS MODERATE 35: CPT | Mod: 57,24

## 2024-11-20 PROCEDURE — 99233 SBSQ HOSP IP/OBS HIGH 50: CPT

## 2024-11-20 PROCEDURE — 99232 SBSQ HOSP IP/OBS MODERATE 35: CPT

## 2024-11-20 RX ORDER — INSULIN REG, HUM S-S BUFF 100/ML
3 VIAL (ML) INJECTION ONCE
Refills: 0 | Status: COMPLETED | OUTPATIENT
Start: 2024-11-20 | End: 2024-11-20

## 2024-11-20 RX ADMIN — ENOXAPARIN SODIUM 40 MILLIGRAM(S): 30 INJECTION SUBCUTANEOUS at 05:46

## 2024-11-20 RX ADMIN — Medication 16 UNIT(S): at 12:03

## 2024-11-20 RX ADMIN — SODIUM CHLORIDE 100 MILLILITER(S): 9 INJECTION, SOLUTION INTRAMUSCULAR; INTRAVENOUS; SUBCUTANEOUS at 08:40

## 2024-11-20 RX ADMIN — Medication 300 MILLIGRAM(S): at 20:41

## 2024-11-20 RX ADMIN — INSULIN GLARGINE 35 UNIT(S): 100 INJECTION, SOLUTION SUBCUTANEOUS at 22:18

## 2024-11-20 RX ADMIN — PIPERACILLIN SODIUM AND TAZOBACTAM SODIUM 25 GRAM(S): 4; .5 INJECTION, POWDER, LYOPHILIZED, FOR SOLUTION INTRAVENOUS at 13:01

## 2024-11-20 RX ADMIN — Medication 6: at 16:51

## 2024-11-20 RX ADMIN — Medication 1 UNIT(S): at 00:39

## 2024-11-20 RX ADMIN — PIPERACILLIN SODIUM AND TAZOBACTAM SODIUM 25 GRAM(S): 4; .5 INJECTION, POWDER, LYOPHILIZED, FOR SOLUTION INTRAVENOUS at 22:09

## 2024-11-20 RX ADMIN — METOPROLOL TARTRATE 50 MILLIGRAM(S): 100 TABLET, FILM COATED ORAL at 05:30

## 2024-11-20 RX ADMIN — ACETAMINOPHEN 500MG 650 MILLIGRAM(S): 500 TABLET, COATED ORAL at 13:00

## 2024-11-20 RX ADMIN — Medication 16 UNIT(S): at 16:51

## 2024-11-20 RX ADMIN — VALSARTAN 320 MILLIGRAM(S): 320 TABLET ORAL at 05:29

## 2024-11-20 RX ADMIN — ROSUVASTATIN CALCIUM 40 MILLIGRAM(S): 5 TABLET, FILM COATED ORAL at 22:09

## 2024-11-20 RX ADMIN — Medication 3 UNIT(S): at 01:50

## 2024-11-20 RX ADMIN — Medication 300 MILLIGRAM(S): at 08:34

## 2024-11-20 RX ADMIN — ACETAMINOPHEN 500MG 650 MILLIGRAM(S): 500 TABLET, COATED ORAL at 22:18

## 2024-11-20 RX ADMIN — PIPERACILLIN SODIUM AND TAZOBACTAM SODIUM 25 GRAM(S): 4; .5 INJECTION, POWDER, LYOPHILIZED, FOR SOLUTION INTRAVENOUS at 00:33

## 2024-11-20 RX ADMIN — Medication 10 UNIT(S): at 08:34

## 2024-11-20 RX ADMIN — Medication 10: at 11:59

## 2024-11-20 RX ADMIN — ACETAMINOPHEN 500MG 650 MILLIGRAM(S): 500 TABLET, COATED ORAL at 12:00

## 2024-11-20 RX ADMIN — PIPERACILLIN SODIUM AND TAZOBACTAM SODIUM 25 GRAM(S): 4; .5 INJECTION, POWDER, LYOPHILIZED, FOR SOLUTION INTRAVENOUS at 05:30

## 2024-11-20 RX ADMIN — Medication 6: at 08:34

## 2024-11-20 RX ADMIN — ACETAMINOPHEN 500MG 650 MILLIGRAM(S): 500 TABLET, COATED ORAL at 05:45

## 2024-11-20 NOTE — PROGRESS NOTE ADULT - SUBJECTIVE AND OBJECTIVE BOX
Interval: Patient was seen resting in bed. Patient s/p right foot partial 4th and 5th ray resection. Patient was satisfied with procedure as his swelling has gone down in his leg as well as his pain levels. Patient is going to OR tomorrow for delay primary closure of his incision. Patient aware and amenable.    HPI:  A 54-year-old male with a history of neuropathy and recurrent foot ulcers presents with right foot swelling and fifth toe discoloration. He developed a blister on the fifth toe on November 8th, which subsequently ruptured with fluid drainage. Despite regular dressing changes, the discoloration and ankle swelling persisted. He denies pain, fever, or chills but reports a malodorous odor from the foot. Prior evaluations by his PCP and podiatrist resulted in no intervention. He denied any recent injury to the toe and denied any similar findings on the other toes.    PMH: DM, HTN, HLD, Asthma  PSH: Denied  Family History: Parents with diabetes (17 Nov 2024 07:44)    Medications acetaminophen     Tablet .. 650 milliGRAM(s) Oral every 6 hours PRN  albuterol    0.083% 2.5 milliGRAM(s) Nebulizer every 6 hours PRN  albuterol    90 MICROgram(s) HFA Inhaler 1 Puff(s) Inhalation every 4 hours PRN  dextrose 5%. 1000 milliLiter(s) IV Continuous <Continuous>  dextrose 5%. 1000 milliLiter(s) IV Continuous <Continuous>  dextrose 5%. 1000 milliLiter(s) IV Continuous <Continuous>  dextrose 5%. 1000 milliLiter(s) IV Continuous <Continuous>  dextrose 50% Injectable 25 Gram(s) IV Push once  dextrose 50% Injectable 12.5 Gram(s) IV Push once  dextrose 50% Injectable 25 Gram(s) IV Push once  dextrose 50% Injectable 25 Gram(s) IV Push once  dextrose 50% Injectable 12.5 Gram(s) IV Push once  dextrose 50% Injectable 25 Gram(s) IV Push once  dextrose Oral Gel 15 Gram(s) Oral once PRN  dextrose Oral Gel 15 Gram(s) Oral once PRN  enoxaparin Injectable 40 milliGRAM(s) SubCutaneous every 24 hours  glucagon  Injectable 1 milliGRAM(s) IntraMuscular once  glucagon  Injectable 1 milliGRAM(s) IntraMuscular once  influenza   Vaccine 0.5 milliLiter(s) IntraMuscular once  insulin glargine Injectable (LANTUS) 35 Unit(s) SubCutaneous at bedtime  insulin lispro (ADMELOG) corrective regimen sliding scale   SubCutaneous three times a day before meals  insulin lispro Injectable (ADMELOG) 10 Unit(s) SubCutaneous three times a day before meals  metoprolol succinate ER 50 milliGRAM(s) Oral daily  piperacillin/tazobactam IVPB.. 3.375 Gram(s) IV Intermittent every 8 hours  rosuvastatin 40 milliGRAM(s) Oral at bedtime  sodium chloride 0.9%. 1000 milliLiter(s) IV Continuous <Continuous>  valsartan 320 milliGRAM(s) Oral daily  vancomycin  IVPB 1500 milliGRAM(s) IV Intermittent every 12 hours    FH,   PMH   PSH    Labs                          8.0    7.44  )-----------( 315      ( 20 Nov 2024 04:00 )             24.9      11-20    137  |  103  |  12.5  ----------------------------<  245[H]  4.3   |  17.0[L]  |  0.76    Ca    8.2[L]      20 Nov 2024 04:00  Phos  2.9     11-20  Mg     1.8     11-20       Vital Signs Last 24 Hrs  T(C): 36.5 (20 Nov 2024 04:26), Max: 36.9 (19 Nov 2024 16:52)  T(F): 97.7 (20 Nov 2024 04:26), Max: 98.4 (19 Nov 2024 16:52)  HR: 74 (20 Nov 2024 04:26) (74 - 84)  BP: 157/80 (20 Nov 2024 04:26) (138/73 - 167/90)  BP(mean): 105 (20 Nov 2024 04:26) (105 - 105)  RR: 18 (20 Nov 2024 04:26) (18 - 18)  SpO2: 99% (20 Nov 2024 04:26) (99% - 99%)    Parameters below as of 20 Nov 2024 04:26  Patient On (Oxygen Delivery Method): room air      Sedimentation Rate, Erythrocyte: 114 mm/hr (11-19-24 @ 03:58)  Sedimentation Rate, Erythrocyte: 98 mm/hr (11-17-24 @ 05:30)         C-Reactive Protein: 239 mg/L (11-19-24 @ 03:58)  C-Reactive Protein: 401 mg/L (11-17-24 @ 05:30)   WBC Count: 7.44 K/uL (11-20-24 @ 04:00)      PHYSICAL EXAM  LE Focused:    Vasc:  DP & PT non-palpable b/l. CFT<3 seconds to all 10 digits b/l. TG warm to warm b/l  Derm: Right foot 5th digit wound. Necrotic and fluctuant. Wound probes to bone. Soft tissue crepitus present, increased malodor, 5cc of seropurulent drainage noted upon expression of the wound. Erythema noted spanning from R 5th digit to ankle.   Neuro: Gross and light touch sensation diminshed b/l  MSK: No pain on palpation to foot. Able to move remaining digits.       IMAGING:< from: CT Lower Extremity No Cont, Right (11.17.24 @ 23:42) >  IMPRESSION:  1.  Osseous destruction is again seen around the 5th proximal   interphalangeal joint with intraosseous gas consistent with emphysematous   osteomyelitis.  2.  Surrounding soft tissue gas is present with soft tissue wound   extending along the dorsum of the 4th webspace to the level of the 4th   and 5th metatarsal necks with presumed packing material versus complex   gas.  3.  Changes of the ankle and midfoot suggest early Charcot arthropathy.  4.  Right inguinal/groin adenopathy.    < end of copied text >      CULTURES: pending

## 2024-11-20 NOTE — PROGRESS NOTE ADULT - SUBJECTIVE AND OBJECTIVE BOX
[facility] Division of Hospital Medicine    INTERVAL HISTORY:  Overnight, no acute events.     Patient seen and examined at bedside this morning in bed. Patient denies chest pain, SOB, abd pain, N/V, fever, chills, dysuria or any other complaints. Has foot pain w/ manipulation.     MEDICATIONS  (STANDING):  dextrose 5%. 1000 milliLiter(s) (100 mL/Hr) IV Continuous <Continuous>  dextrose 5%. 1000 milliLiter(s) (100 mL/Hr) IV Continuous <Continuous>  dextrose 5%. 1000 milliLiter(s) (50 mL/Hr) IV Continuous <Continuous>  dextrose 5%. 1000 milliLiter(s) (50 mL/Hr) IV Continuous <Continuous>  dextrose 50% Injectable 25 Gram(s) IV Push once  dextrose 50% Injectable 12.5 Gram(s) IV Push once  dextrose 50% Injectable 25 Gram(s) IV Push once  dextrose 50% Injectable 25 Gram(s) IV Push once  dextrose 50% Injectable 12.5 Gram(s) IV Push once  dextrose 50% Injectable 25 Gram(s) IV Push once  enoxaparin Injectable 40 milliGRAM(s) SubCutaneous every 24 hours  glucagon  Injectable 1 milliGRAM(s) IntraMuscular once  glucagon  Injectable 1 milliGRAM(s) IntraMuscular once  influenza   Vaccine 0.5 milliLiter(s) IntraMuscular once  insulin glargine Injectable (LANTUS) 35 Unit(s) SubCutaneous at bedtime  insulin lispro (ADMELOG) corrective regimen sliding scale   SubCutaneous three times a day before meals  insulin lispro Injectable (ADMELOG) 16 Unit(s) SubCutaneous three times a day before meals  metoprolol succinate ER 50 milliGRAM(s) Oral daily  piperacillin/tazobactam IVPB.. 3.375 Gram(s) IV Intermittent every 8 hours  rosuvastatin 40 milliGRAM(s) Oral at bedtime  sodium chloride 0.9%. 1000 milliLiter(s) (100 mL/Hr) IV Continuous <Continuous>  valsartan 320 milliGRAM(s) Oral daily  vancomycin  IVPB 1500 milliGRAM(s) IV Intermittent every 12 hours    MEDICATIONS  (PRN):  acetaminophen     Tablet .. 650 milliGRAM(s) Oral every 6 hours PRN Temp greater or equal to 38C (100.4F), Mild Pain (1 - 3)  albuterol    0.083% 2.5 milliGRAM(s) Nebulizer every 6 hours PRN Shortness of Breath and/or Wheezing  albuterol    90 MICROgram(s) HFA Inhaler 1 Puff(s) Inhalation every 4 hours PRN Shortness of Breath and/or Wheezing  dextrose Oral Gel 15 Gram(s) Oral once PRN Blood Glucose LESS THAN 70 milliGRAM(s)/deciliter  dextrose Oral Gel 15 Gram(s) Oral once PRN Blood Glucose LESS THAN 70 milliGRAM(s)/deciliter        I&O's Summary    19 Nov 2024 07:01  -  20 Nov 2024 07:00  --------------------------------------------------------  IN: 0 mL / OUT: 1175 mL / NET: -1175 mL    20 Nov 2024 07:01  -  20 Nov 2024 12:26  --------------------------------------------------------  IN: 650 mL / OUT: 0 mL / NET: 650 mL        PHYSICAL EXAM:  Vital Signs Last 24 Hrs  T(C): 36.6 (20 Nov 2024 09:45), Max: 36.9 (19 Nov 2024 16:52)  T(F): 97.8 (20 Nov 2024 09:45), Max: 98.4 (19 Nov 2024 16:52)  HR: 73 (20 Nov 2024 09:45) (73 - 84)  BP: 166/79 (20 Nov 2024 09:45) (138/73 - 166/79)  BP(mean): 105 (20 Nov 2024 04:26) (105 - 105)  RR: 18 (20 Nov 2024 09:45) (18 - 18)  SpO2: 98% (20 Nov 2024 09:45) (98% - 99%)    Parameters below as of 20 Nov 2024 04:26  Patient On (Oxygen Delivery Method): room air      CONSTITUTIONAL: No apparent distress  HEENT: Normocephalic, Atraumatic.   RESPIRATORY:  lungs are clear to auscultation bilaterally, No crackles, rhonchi, wheezes  CARDIOVASCULAR: Regular rate and rhythm  EXT: +LLE edema, L foot wrapped and bleeding through dressing  ABDOMEN: Soft, non-distended, nontender to palpation, +BS  MUSCLOSKELETAL: moving all 4 extremities spontaneously  PSYCH: thoughts linear, affect appropriate  NEUROLOGY: Alert, Oriented x3    LABS:                        8.0    7.44  )-----------( 315      ( 20 Nov 2024 04:00 )             24.9     11-20    137  |  103  |  12.5  ----------------------------<  245[H]  4.3   |  17.0[L]  |  0.76    Ca    8.2[L]      20 Nov 2024 04:00  Phos  2.9     11-20  Mg     1.8     11-20            Urinalysis Basic - ( 20 Nov 2024 04:00 )    Color: x / Appearance: x / SG: x / pH: x  Gluc: 245 mg/dL / Ketone: x  / Bili: x / Urobili: x   Blood: x / Protein: x / Nitrite: x   Leuk Esterase: x / RBC: x / WBC x   Sq Epi: x / Non Sq Epi: x / Bacteria: x        Culture - Wound Aerobic/Anaerobic (collected 18 Nov 2024 21:21)  Source: .Surgical Swab  Preliminary Report (20 Nov 2024 06:30):    No growth    Culture - Tissue with Gram Stain (collected 18 Nov 2024 21:20)  Source: Tissue  Gram Stain (19 Nov 2024 06:14):    No polymorphonuclear cells seen per low power field    No organisms seen per oil power field  Preliminary Report (20 Nov 2024 07:10):    No growth    Culture - Tissue with Gram Stain (collected 18 Nov 2024 21:19)  Source: Tissue  Gram Stain (19 Nov 2024 06:14):    No polymorphonuclear cells seen per low power field    No organisms seen per oil power field  Preliminary Report (20 Nov 2024 07:25):    No growth    Culture - Wound Aerobic/Anaerobic (collected 17 Nov 2024 13:05)  Source: Skin/Wound  Final Report (19 Nov 2024 22:09):    Moderate Streptococcus constellatus "Susceptibilities not performed"    Numerous Prevotella intermedia "Susceptibilities not performed"    Rare Staphylococcus epidermidis      CAPILLARY BLOOD GLUCOSE      POCT Blood Glucose.: 366 mg/dL (20 Nov 2024 11:56)  POCT Blood Glucose.: 267 mg/dL (20 Nov 2024 07:49)  POCT Blood Glucose.: 272 mg/dL (20 Nov 2024 05:27)  POCT Blood Glucose.: 293 mg/dL (20 Nov 2024 02:42)  POCT Blood Glucose.: 434 mg/dL (20 Nov 2024 01:10)  POCT Blood Glucose.: 352 mg/dL (19 Nov 2024 23:43)  POCT Blood Glucose.: 357 mg/dL (19 Nov 2024 22:03)  POCT Blood Glucose.: 443 mg/dL (19 Nov 2024 19:53)  POCT Blood Glucose.: 445 mg/dL (19 Nov 2024 19:47)  POCT Blood Glucose.: 469 mg/dL (19 Nov 2024 17:59)  POCT Blood Glucose.: 547 mg/dL (19 Nov 2024 17:57)  POCT Blood Glucose.: 524 mg/dL (19 Nov 2024 16:31)        RADIOLOGY & ADDITIONAL TESTS:  Results Reviewed:   Imaging Personally Reviewed:  Electrocardiogram Personally Reviewed:

## 2024-11-20 NOTE — PROGRESS NOTE ADULT - ASSESSMENT
54M with PMHx HTN, HLD, DM, asthma, neuropathy, presented with right toe discoloration and leg swelling. S/p right partial 4th and 5th ray amputation.    Consulted for diabetes management  Home regimen: States he was on insulin pump prior to admission, last office notes have basal/bolus dosing  Current a1c: 9.1%  Outpatient Endocrinologist: Dr Cross    1. Uncontrolled DM with hyperglycemia  - Increase admelog to 16 units TID  - Continue lantus 35 units qhs  - Correction scale  - Has follow up with our office 12/31     2. Right foot wound  - S/p right partial 4th and 5th ray amputation  - Patient is scheduled to go back to OR tomorrow Thursday 11/21 for wound debridement with delay primary closure  - Care per podiatry    3. HLD  - Continue statin

## 2024-11-20 NOTE — PROGRESS NOTE ADULT - SUBJECTIVE AND OBJECTIVE BOX
INFECTIOUS DISEASES AND INTERNAL MEDICINE at [place]  =======================================================  Chris Voss MD  Diplomates American Board of Internal Medicine and Infectious Diseases  Telephone [phone]  Fax            [phone]  =======================================================    JANIS LEON [id number]    Follow up: right foot diabetic foot osteo    Allergies:  No Known Allergies      Medications:  acetaminophen     Tablet .. 650 milliGRAM(s) Oral every 6 hours PRN  albuterol    0.083% 2.5 milliGRAM(s) Nebulizer every 6 hours PRN  albuterol    90 MICROgram(s) HFA Inhaler 1 Puff(s) Inhalation every 4 hours PRN  dextrose 5%. 1000 milliLiter(s) IV Continuous <Continuous>  dextrose 5%. 1000 milliLiter(s) IV Continuous <Continuous>  dextrose 5%. 1000 milliLiter(s) IV Continuous <Continuous>  dextrose 5%. 1000 milliLiter(s) IV Continuous <Continuous>  dextrose 50% Injectable 25 Gram(s) IV Push once  dextrose 50% Injectable 12.5 Gram(s) IV Push once  dextrose 50% Injectable 25 Gram(s) IV Push once  dextrose 50% Injectable 25 Gram(s) IV Push once  dextrose 50% Injectable 12.5 Gram(s) IV Push once  dextrose 50% Injectable 25 Gram(s) IV Push once  dextrose Oral Gel 15 Gram(s) Oral once PRN  dextrose Oral Gel 15 Gram(s) Oral once PRN  enoxaparin Injectable 40 milliGRAM(s) SubCutaneous every 24 hours  glucagon  Injectable 1 milliGRAM(s) IntraMuscular once  glucagon  Injectable 1 milliGRAM(s) IntraMuscular once  influenza   Vaccine 0.5 milliLiter(s) IntraMuscular once  insulin glargine Injectable (LANTUS) 30 Unit(s) SubCutaneous at bedtime  insulin lispro (ADMELOG) corrective regimen sliding scale   SubCutaneous three times a day before meals  insulin lispro Injectable (ADMELOG) 5 Unit(s) SubCutaneous three times a day before meals  metoprolol succinate ER 50 milliGRAM(s) Oral daily  piperacillin/tazobactam IVPB.. 3.375 Gram(s) IV Intermittent every 8 hours  rosuvastatin 40 milliGRAM(s) Oral at bedtime  sodium chloride 0.9%. 1000 milliLiter(s) IV Continuous <Continuous>  valsartan 320 milliGRAM(s) Oral daily  vancomycin  IVPB 1500 milliGRAM(s) IV Intermittent every 12 hours    SOCIAL       FAMILY   FAMILY HISTORY:    REVIEW OF SYSTEMS:  CONSTITUTIONAL:  No Fever or chills  HEENT:   No diplopia or blurred vision.  No earache, sore throat or runny nose.  CARDIOVASCULAR:  No pressure, squeezing, strangling, tightness, heaviness or aching about the chest, neck, axilla or epigastrium.  RESPIRATORY:  No cough, shortness of breath, PND or orthopnea.  GASTROINTESTINAL:  No nausea, vomiting or diarrhea.  GENITOURINARY:  No dysuria, frequency or urgency. No Blood in urine  MUSCULOSKELETAL:   aS   Per HPI  NEUROLOGIC:  No paresthesias, fasciculations, seizures or weakness.  PSYCHIATRIC:  No disorder of thought or mood.  ENDOCRINE:  No heat or cold intolerance, polyuria or polydipsia.  HEMATOLOGICAL:  No easy bruising or bleeding.            Physical Exam:   Vital Signs Last 24 Hrs  T(C): 36.5 (20 Nov 2024 04:26), Max: 36.9 (19 Nov 2024 16:52)  T(F): 97.7 (20 Nov 2024 04:26), Max: 98.4 (19 Nov 2024 16:52)  HR: 74 (20 Nov 2024 04:26) (74 - 84)  BP: 157/80 (20 Nov 2024 04:26) (138/73 - 167/90)  BP(mean): 105 (20 Nov 2024 04:26) (105 - 105)  RR: 18 (20 Nov 2024 04:26) (18 - 18)  SpO2: 99% (20 Nov 2024 04:26) (99% - 99%)    Parameters below as of 20 Nov 2024 04:26  Patient On (Oxygen Delivery Method): room air              GEN: NAD,   HEENT: normocephalic and atraumatic. EOMI. CARTER.    NECK: Supple. No carotid bruits.  No lymphadenopathy or thyromegaly.  LUNGS: Clear to auscultation.  HEART: Regular rate and rhythm without murmur.  ABDOMEN: Soft, nontender, and nondistended.  Positive bowel sounds.    : No CVA tenderness  EXTREMITIES: RIGHT FOOT   S/P 4TH ,5TH AMPUITATION  DECREASED EDEMA SURGICAL SITE APPEARS CLEAN NO ODOR   MSK: no joint swelling  NEUROLOGIC: Cranial nerves II through XII are grossly intact.  PSYCHIATRIC: Appropriate affect .  SKIN: No ulceration or induration present.        Labs:  Vitals:  ============  T(F): 97.9 (19 Nov 2024 05:16), Max: 98.5 (18 Nov 2024 20:10)  HR: 79 (19 Nov 2024 05:16)  BP: 156/91 (19 Nov 2024 07:01)  RR: 18 (19 Nov 2024 05:16)  SpO2: 96% (19 Nov 2024 05:16) (95% - 100%)  temp max in last 48H T(F): , Max: 101.5 (11-17-24 @ 13:48)    =======================================================  Current Antibiotics:  piperacillin/tazobactam IVPB.. 3.375 Gram(s) IV Intermittent every 8 hours  vancomycin  IVPB 1500 milliGRAM(s) IV Intermittent every 12 hours    Other medications:  dextrose 5%. 1000 milliLiter(s) IV Continuous <Continuous>  dextrose 5%. 1000 milliLiter(s) IV Continuous <Continuous>  dextrose 5%. 1000 milliLiter(s) IV Continuous <Continuous>  dextrose 5%. 1000 milliLiter(s) IV Continuous <Continuous>  dextrose 50% Injectable 25 Gram(s) IV Push once  dextrose 50% Injectable 12.5 Gram(s) IV Push once  dextrose 50% Injectable 25 Gram(s) IV Push once  dextrose 50% Injectable 25 Gram(s) IV Push once  dextrose 50% Injectable 12.5 Gram(s) IV Push once  dextrose 50% Injectable 25 Gram(s) IV Push once  enoxaparin Injectable 40 milliGRAM(s) SubCutaneous every 24 hours  glucagon  Injectable 1 milliGRAM(s) IntraMuscular once  glucagon  Injectable 1 milliGRAM(s) IntraMuscular once  influenza   Vaccine 0.5 milliLiter(s) IntraMuscular once  insulin glargine Injectable (LANTUS) 30 Unit(s) SubCutaneous at bedtime  insulin lispro (ADMELOG) corrective regimen sliding scale   SubCutaneous three times a day before meals  insulin lispro Injectable (ADMELOG) 5 Unit(s) SubCutaneous three times a day before meals  metoprolol succinate ER 50 milliGRAM(s) Oral daily  rosuvastatin 40 milliGRAM(s) Oral at bedtime  sodium chloride 0.9%. 1000 milliLiter(s) IV Continuous <Continuous>  valsartan 320 milliGRAM(s) Oral daily      =======================================================  Labs:                           8.0    7.44  )-----------( 315      ( 20 Nov 2024 04:00 )             24.9   11-20    137  |  103  |  12.5  ----------------------------<  245[H]  4.3   |  17.0[L]  |  0.76    Ca    8.2[L]      20 Nov 2024 04:00  Phos  2.9     11-20  Mg     1.8     11-20          Culture - Tissue with Gram Stain (collected 11-18-24 @ 21:20)  Source: Tissue  Gram Stain (11-19-24 @ 06:14):    No polymorphonuclear cells seen per low power field    No organisms seen per oil power field    Culture - Tissue with Gram Stain (collected 11-18-24 @ 21:19)  Source: Tissue  Gram Stain (11-19-24 @ 06:14):    No polymorphonuclear cells seen per low power field    No organisms seen per oil power field    Culture - Wound Aerobic/Anaerobic (collected 11-17-24 @ 13:05)  Source: Skin/Wound  Preliminary Report (11-18-24 @ 22:05):    Moderate Streptococcus constellatus    "Susceptibilities not performed"    Culture - Blood (collected 11-17-24 @ 05:30)  Source: .Blood BLOOD  Preliminary Report (11-19-24 @ 09:01):    No growth at 48 Hours      Creatinine: 0.87 mg/dL (11-19-24 @ 03:58)  Creatinine: 0.93 mg/dL (11-18-24 @ 03:56)  Creatinine: 1.36 mg/dL (11-17-24 @ 05:30)    Procalcitonin: 0.28 ng/mL (11-17-24 @ 15:00)        C-Reactive Protein: 239 mg/L (11-19-24 @ 03:58)  C-Reactive Protein: 401 mg/L (11-17-24 @ 05:30)    WBC Count: 8.22 K/uL (11-19-24 @ 03:58)  WBC Count: 9.42 K/uL (11-18-24 @ 03:56)  WBC Count: 13.22 K/uL (11-17-24 @ 05:30)        Alkaline Phosphatase: 137 U/L (11-17-24 @ 05:30)  Alanine Aminotransferase (ALT/SGPT): 28 U/L (11-17-24 @ 05:30)  Aspartate Aminotransferase (AST/SGOT): 26 U/L (11-17-24 @ 05:30)  Bilirubin Total: 0.4 mg/dL (11-17-24 @ 05:30)

## 2024-11-20 NOTE — PROGRESS NOTE ADULT - ATTENDING COMMENTS
Plan for primary closure tomorrow   Amputation site appears viable without worsening necrosis  there is persistent venous ooze from site   Intraop path and cultures pending would likely treat for full 6 weeks iv abx given severity of bone osteomyelitis intraoperative finding

## 2024-11-20 NOTE — PROGRESS NOTE ADULT - ASSESSMENT
54M who presented with continued right foot swelling and fifth toe discoloration. The patient reported that he had first noted a blister to the fifth toe two weeks prior and was evaluated by his primary care physician and podiatrist without further intervention. The patient reported that the blister opened with drainage of fluid. He denied any pain but reported that he had decreased sensation due to neuropathy. He did not have any fevers or chills but noted a malodorous odor from the foot.. He continued to put a dressing on the toe but the discoloration to the toe and swelling to the ankle did not improve. He denied any recent injury to the toe and denied any similar findings on the other toes.  AS ABOVE WITH NECROTIC CHANGES TO FOOT AS PER PHOTO OF PT  PT WITH FOUL SMELLING FOOT  AND WAS BROUGHT TO THE OR YESTERDAY 11/18  PT S/P 4TH AND 5TH TOE Amputation  PUS NOTED GANGRENOUS TOE REMOVED   OPERATIVE CX SO FAR NEGATIVE  WILL CONTINUE ZOSYN/VANCO     FOOT EXAMINED WITH PODIATRY TODAY  APPEARS CLEAN  NO ODR CONTINUE IV ABX AND WILL FOLLOWUP CX

## 2024-11-20 NOTE — PROGRESS NOTE ADULT - SUBJECTIVE AND OBJECTIVE BOX
INTERVAL EVENTS:  Follow up diabetes management. Glucoses elevated, pt denies acute complaints at time of exam.     MEDICATIONS  (STANDING):  dextrose 5%. 1000 milliLiter(s) (50 mL/Hr) IV Continuous <Continuous>  dextrose 5%. 1000 milliLiter(s) (50 mL/Hr) IV Continuous <Continuous>  dextrose 5%. 1000 milliLiter(s) (100 mL/Hr) IV Continuous <Continuous>  dextrose 5%. 1000 milliLiter(s) (100 mL/Hr) IV Continuous <Continuous>  dextrose 50% Injectable 25 Gram(s) IV Push once  dextrose 50% Injectable 12.5 Gram(s) IV Push once  dextrose 50% Injectable 25 Gram(s) IV Push once  dextrose 50% Injectable 25 Gram(s) IV Push once  dextrose 50% Injectable 12.5 Gram(s) IV Push once  dextrose 50% Injectable 25 Gram(s) IV Push once  enoxaparin Injectable 40 milliGRAM(s) SubCutaneous every 24 hours  glucagon  Injectable 1 milliGRAM(s) IntraMuscular once  glucagon  Injectable 1 milliGRAM(s) IntraMuscular once  influenza   Vaccine 0.5 milliLiter(s) IntraMuscular once  insulin glargine Injectable (LANTUS) 35 Unit(s) SubCutaneous at bedtime  insulin lispro (ADMELOG) corrective regimen sliding scale   SubCutaneous three times a day before meals  insulin lispro Injectable (ADMELOG) 16 Unit(s) SubCutaneous three times a day before meals  metoprolol succinate ER 50 milliGRAM(s) Oral daily  piperacillin/tazobactam IVPB.. 3.375 Gram(s) IV Intermittent every 8 hours  rosuvastatin 40 milliGRAM(s) Oral at bedtime  sodium chloride 0.9%. 1000 milliLiter(s) (100 mL/Hr) IV Continuous <Continuous>  valsartan 320 milliGRAM(s) Oral daily  vancomycin  IVPB 1500 milliGRAM(s) IV Intermittent every 12 hours    MEDICATIONS  (PRN):  acetaminophen     Tablet .. 650 milliGRAM(s) Oral every 6 hours PRN Temp greater or equal to 38C (100.4F), Mild Pain (1 - 3)  albuterol    0.083% 2.5 milliGRAM(s) Nebulizer every 6 hours PRN Shortness of Breath and/or Wheezing  albuterol    90 MICROgram(s) HFA Inhaler 1 Puff(s) Inhalation every 4 hours PRN Shortness of Breath and/or Wheezing  dextrose Oral Gel 15 Gram(s) Oral once PRN Blood Glucose LESS THAN 70 milliGRAM(s)/deciliter  dextrose Oral Gel 15 Gram(s) Oral once PRN Blood Glucose LESS THAN 70 milliGRAM(s)/deciliter    Allergies  No Known Allergies    Vital Signs Last 24 Hrs  T(C): 36.6 (20 Nov 2024 09:45), Max: 36.9 (19 Nov 2024 16:52)  T(F): 97.8 (20 Nov 2024 09:45), Max: 98.4 (19 Nov 2024 16:52)  HR: 73 (20 Nov 2024 09:45) (73 - 84)  BP: 166/79 (20 Nov 2024 09:45) (138/73 - 166/79)  BP(mean): 105 (20 Nov 2024 04:26) (105 - 105)  RR: 18 (20 Nov 2024 09:45) (18 - 18)  SpO2: 98% (20 Nov 2024 09:45) (98% - 99%)    Parameters below as of 20 Nov 2024 04:26  Patient On (Oxygen Delivery Method): room air    PHYSICAL EXAM:  General: No apparent distress  Respiratory: Lungs clear bilaterally  Cardiac: +S1, S2, no m/r/g  GI: +BS, soft, non tender, non distended  Extremities: R foot in dressing  Neuro: A+O X3    LABS:                        8.0    7.44  )-----------( 315      ( 20 Nov 2024 04:00 )             24.9     11-20    137  |  103  |  12.5  ----------------------------<  245[H]  4.3   |  17.0[L]  |  0.76    Ca    8.2[L]      20 Nov 2024 04:00  Phos  2.9     11-20  Mg     1.8     11-20      Urinalysis Basic - ( 20 Nov 2024 04:00 )    Color: x / Appearance: x / SG: x / pH: x  Gluc: 245 mg/dL / Ketone: x  / Bili: x / Urobili: x   Blood: x / Protein: x / Nitrite: x   Leuk Esterase: x / RBC: x / WBC x   Sq Epi: x / Non Sq Epi: x / Bacteria: x    POCT Blood Glucose.: 267 mg/dL (11-20-24 @ 07:49)  POCT Blood Glucose.: 272 mg/dL (11-20-24 @ 05:27)  POCT Blood Glucose.: 293 mg/dL (11-20-24 @ 02:42)  POCT Blood Glucose.: 434 mg/dL (11-20-24 @ 01:10)  POCT Blood Glucose.: 352 mg/dL (11-19-24 @ 23:43)  POCT Blood Glucose.: 357 mg/dL (11-19-24 @ 22:03)  POCT Blood Glucose.: 443 mg/dL (11-19-24 @ 19:53)

## 2024-11-20 NOTE — PROGRESS NOTE ADULT - ASSESSMENT
A:  R foot necrotic 5th digit  S/P right partial 4th and 5th ray amputation  11/18    P:   Patient seen and evaluated at bedside and Chart reviewed.  leukocytosis present with vitals stable  Discussed diagnosis and treatment with patient  X-rays evaluated  CT reviewed- results noted above   Venous duplex negative   Culture and Intraop culture results pending   Appreciate ID recommendations  Appreciate vascular recommendations  Surgicell, packing, guaze, ABD, kerlix and ACE applied  Patient to keep the dressing clean, dry and intact  Patient is scheduled to go back to OR tomorrow Thursday 11/21 for wound debridement with delay primary closure. Patient is aware and amenable.  Please keep patient medically optimized.   NPO midnight  T&S appreciated  Patient to be nonweight bearing. Recommend staying in bed with leg elevated at all times due to bleeding from foot.  Discussed importance of daily foot examinations and proper shoe gear and to importance of lower Fasting Blood Glucose levels.   Podiatry to follow while in house.  Seen, reviewed, and treated with Dr. Vidal

## 2024-11-20 NOTE — PROGRESS NOTE ADULT - ASSESSMENT
54yoM with PMHx of hypertension, hyperlipidemia, diabetes, and asthma with neuropathy, who presented with a two week history of right toe discoloration and leg swelling with malodorous odor.    #Sepsis / Cellulitis  - s/p I&D with podiatry   - Xray of the foot: Diffuse soft tissue swelling with subcutaneous air. Suspect pathologic fracture of the fifth proximal phalanx. Second and third metatarsal bone diaphyseal periostitis. Pes planus. Suspect anterior talus have fracture.  - s/p right foot partial 4th and 5th ray resection, left open  - zosyn, vanc   - Blood culture pending   - LE duplex to r/o DVT: negative   - ELLIE, adequate flow in both LE   - scheduled for OR tomorrow for wound debridement with delay primary closure   - nonweight bearing, elevating leg today     #Diabetes  - Metformin to be held  - holding insulin pump  - sugars in 400s  - endocrine consulted, c/w lantus 35units qhs and increase admelog 16units tid w/ sliding scale   - continue to monitor     #Hypertension  - Close blood pressure monitoring  - On metoprolol and valsartan    #Anemia  - The patient reported a history of anemia in the past for which he was taking B12 and multivitamin  - Monitoring hemoglobin levels    #OMID, likely pre-renal   - Cr 1.36, improved to 0.93 w/ IVF     Dispo: medically acute, pending OR tomorrow on iv abx

## 2024-11-21 ENCOUNTER — APPOINTMENT (OUTPATIENT)
Facility: HOSPITAL | Age: 54
End: 2024-11-21

## 2024-11-21 ENCOUNTER — TRANSCRIPTION ENCOUNTER (OUTPATIENT)
Age: 54
End: 2024-11-21

## 2024-11-21 LAB
ANION GAP SERPL CALC-SCNC: 11 MMOL/L — SIGNIFICANT CHANGE UP (ref 5–17)
BLD GP AB SCN SERPL QL: SIGNIFICANT CHANGE UP
BUN SERPL-MCNC: 6.6 MG/DL — LOW (ref 8–20)
CALCIUM SERPL-MCNC: 8.2 MG/DL — LOW (ref 8.4–10.5)
CHLORIDE SERPL-SCNC: 101 MMOL/L — SIGNIFICANT CHANGE UP (ref 96–108)
CO2 SERPL-SCNC: 23 MMOL/L — SIGNIFICANT CHANGE UP (ref 22–29)
CREAT SERPL-MCNC: 0.76 MG/DL — SIGNIFICANT CHANGE UP (ref 0.5–1.3)
EGFR: 107 ML/MIN/1.73M2 — SIGNIFICANT CHANGE UP
GLUCOSE BLDC GLUCOMTR-MCNC: 229 MG/DL — HIGH (ref 70–99)
GLUCOSE BLDC GLUCOMTR-MCNC: 243 MG/DL — HIGH (ref 70–99)
GLUCOSE BLDC GLUCOMTR-MCNC: 263 MG/DL — HIGH (ref 70–99)
GLUCOSE BLDC GLUCOMTR-MCNC: 296 MG/DL — HIGH (ref 70–99)
GLUCOSE BLDC GLUCOMTR-MCNC: 304 MG/DL — HIGH (ref 70–99)
GLUCOSE BLDC GLUCOMTR-MCNC: 316 MG/DL — HIGH (ref 70–99)
GLUCOSE SERPL-MCNC: 284 MG/DL — HIGH (ref 70–99)
HCT VFR BLD CALC: 26 % — LOW (ref 39–50)
HGB BLD-MCNC: 8.6 G/DL — LOW (ref 13–17)
MAGNESIUM SERPL-MCNC: 1.6 MG/DL — SIGNIFICANT CHANGE UP (ref 1.6–2.6)
MCHC RBC-ENTMCNC: 29.2 PG — SIGNIFICANT CHANGE UP (ref 27–34)
MCHC RBC-ENTMCNC: 33.1 G/DL — SIGNIFICANT CHANGE UP (ref 32–36)
MCV RBC AUTO: 88.1 FL — SIGNIFICANT CHANGE UP (ref 80–100)
PHOSPHATE SERPL-MCNC: 3 MG/DL — SIGNIFICANT CHANGE UP (ref 2.4–4.7)
PLATELET # BLD AUTO: 361 K/UL — SIGNIFICANT CHANGE UP (ref 150–400)
POTASSIUM SERPL-MCNC: 4 MMOL/L — SIGNIFICANT CHANGE UP (ref 3.5–5.3)
POTASSIUM SERPL-SCNC: 4 MMOL/L — SIGNIFICANT CHANGE UP (ref 3.5–5.3)
RBC # BLD: 2.95 M/UL — LOW (ref 4.2–5.8)
RBC # FLD: 13 % — SIGNIFICANT CHANGE UP (ref 10.3–14.5)
SODIUM SERPL-SCNC: 135 MMOL/L — SIGNIFICANT CHANGE UP (ref 135–145)
WBC # BLD: 7.84 K/UL — SIGNIFICANT CHANGE UP (ref 3.8–10.5)
WBC # FLD AUTO: 7.84 K/UL — SIGNIFICANT CHANGE UP (ref 3.8–10.5)

## 2024-11-21 PROCEDURE — 99232 SBSQ HOSP IP/OBS MODERATE 35: CPT

## 2024-11-21 PROCEDURE — 13160 SEC CLSR SURG WND/DEHSN XTN: CPT | Mod: 58,RT

## 2024-11-21 RX ORDER — VANCOMYCIN HCL 900 MCG/MG
1500 POWDER (GRAM) MISCELLANEOUS EVERY 12 HOURS
Refills: 0 | Status: DISCONTINUED | OUTPATIENT
Start: 2024-11-21 | End: 2024-11-21

## 2024-11-21 RX ORDER — ROSUVASTATIN CALCIUM 5 MG/1
40 TABLET, FILM COATED ORAL AT BEDTIME
Refills: 0 | Status: DISCONTINUED | OUTPATIENT
Start: 2024-11-21 | End: 2024-11-22

## 2024-11-21 RX ORDER — PIPERACILLIN SODIUM AND TAZOBACTAM SODIUM 4; .5 G/20ML; G/20ML
3.38 INJECTION, POWDER, LYOPHILIZED, FOR SOLUTION INTRAVENOUS EVERY 8 HOURS
Refills: 0 | Status: DISCONTINUED | OUTPATIENT
Start: 2024-11-21 | End: 2024-11-22

## 2024-11-21 RX ORDER — ACETAMINOPHEN 500MG 500 MG/1
650 TABLET, COATED ORAL EVERY 6 HOURS
Refills: 0 | Status: DISCONTINUED | OUTPATIENT
Start: 2024-11-21 | End: 2024-11-22

## 2024-11-21 RX ORDER — SODIUM CHLORIDE 9 MG/ML
1000 INJECTION, SOLUTION INTRAMUSCULAR; INTRAVENOUS; SUBCUTANEOUS
Refills: 0 | Status: DISCONTINUED | OUTPATIENT
Start: 2024-11-21 | End: 2024-11-22

## 2024-11-21 RX ORDER — 0.9 % SODIUM CHLORIDE 0.9 %
1000 INTRAVENOUS SOLUTION INTRAVENOUS
Refills: 0 | Status: DISCONTINUED | OUTPATIENT
Start: 2024-11-21 | End: 2024-11-22

## 2024-11-21 RX ORDER — VALSARTAN 320 MG/1
320 TABLET ORAL DAILY
Refills: 0 | Status: DISCONTINUED | OUTPATIENT
Start: 2024-11-21 | End: 2024-11-22

## 2024-11-21 RX ORDER — GLUCAGON INJECTION, SOLUTION 0.5 MG/.1ML
1 INJECTION, SOLUTION SUBCUTANEOUS ONCE
Refills: 0 | Status: DISCONTINUED | OUTPATIENT
Start: 2024-11-21 | End: 2024-11-22

## 2024-11-21 RX ORDER — METOPROLOL TARTRATE 100 MG/1
50 TABLET, FILM COATED ORAL DAILY
Refills: 0 | Status: DISCONTINUED | OUTPATIENT
Start: 2024-11-21 | End: 2024-11-22

## 2024-11-21 RX ORDER — INSULIN GLARGINE 100 [IU]/ML
42 INJECTION, SOLUTION SUBCUTANEOUS AT BEDTIME
Refills: 0 | Status: DISCONTINUED | OUTPATIENT
Start: 2024-11-21 | End: 2024-11-21

## 2024-11-21 RX ORDER — ALBUTEROL 90 MCG
1 AEROSOL (GRAM) INHALATION EVERY 4 HOURS
Refills: 0 | Status: DISCONTINUED | OUTPATIENT
Start: 2024-11-21 | End: 2024-11-22

## 2024-11-21 RX ORDER — INSULIN REG, HUM S-S BUFF 100/ML
8 VIAL (ML) INJECTION ONCE
Refills: 0 | Status: COMPLETED | OUTPATIENT
Start: 2024-11-21 | End: 2024-11-21

## 2024-11-21 RX ORDER — ALBUTEROL 90 MCG
2.5 AEROSOL (GRAM) INHALATION EVERY 6 HOURS
Refills: 0 | Status: DISCONTINUED | OUTPATIENT
Start: 2024-11-21 | End: 2024-11-22

## 2024-11-21 RX ORDER — INSULIN GLARGINE 100 [IU]/ML
35 INJECTION, SOLUTION SUBCUTANEOUS AT BEDTIME
Refills: 0 | Status: DISCONTINUED | OUTPATIENT
Start: 2024-11-21 | End: 2024-11-22

## 2024-11-21 RX ORDER — ENOXAPARIN SODIUM 30 MG/.3ML
40 INJECTION SUBCUTANEOUS EVERY 24 HOURS
Refills: 0 | Status: DISCONTINUED | OUTPATIENT
Start: 2024-11-21 | End: 2024-11-22

## 2024-11-21 RX ADMIN — VALSARTAN 320 MILLIGRAM(S): 320 TABLET ORAL at 05:14

## 2024-11-21 RX ADMIN — PIPERACILLIN SODIUM AND TAZOBACTAM SODIUM 25 GRAM(S): 4; .5 INJECTION, POWDER, LYOPHILIZED, FOR SOLUTION INTRAVENOUS at 13:01

## 2024-11-21 RX ADMIN — SODIUM CHLORIDE 100 MILLILITER(S): 9 INJECTION, SOLUTION INTRAMUSCULAR; INTRAVENOUS; SUBCUTANEOUS at 07:50

## 2024-11-21 RX ADMIN — METOPROLOL TARTRATE 50 MILLIGRAM(S): 100 TABLET, FILM COATED ORAL at 05:14

## 2024-11-21 RX ADMIN — PIPERACILLIN SODIUM AND TAZOBACTAM SODIUM 25 GRAM(S): 4; .5 INJECTION, POWDER, LYOPHILIZED, FOR SOLUTION INTRAVENOUS at 05:14

## 2024-11-21 RX ADMIN — ROSUVASTATIN CALCIUM 40 MILLIGRAM(S): 5 TABLET, FILM COATED ORAL at 23:12

## 2024-11-21 RX ADMIN — Medication 8 UNIT(S): at 20:03

## 2024-11-21 RX ADMIN — INSULIN GLARGINE 35 UNIT(S): 100 INJECTION, SOLUTION SUBCUTANEOUS at 23:13

## 2024-11-21 RX ADMIN — Medication 300 MILLIGRAM(S): at 07:50

## 2024-11-21 RX ADMIN — ACETAMINOPHEN 500MG 650 MILLIGRAM(S): 500 TABLET, COATED ORAL at 09:48

## 2024-11-21 RX ADMIN — PIPERACILLIN SODIUM AND TAZOBACTAM SODIUM 25 GRAM(S): 4; .5 INJECTION, POWDER, LYOPHILIZED, FOR SOLUTION INTRAVENOUS at 23:12

## 2024-11-21 RX ADMIN — SODIUM CHLORIDE 100 MILLILITER(S): 9 INJECTION, SOLUTION INTRAMUSCULAR; INTRAVENOUS; SUBCUTANEOUS at 23:17

## 2024-11-21 RX ADMIN — Medication 10: at 11:09

## 2024-11-21 RX ADMIN — Medication 2.5 MILLIGRAM(S): at 21:22

## 2024-11-21 RX ADMIN — ACETAMINOPHEN 500MG 650 MILLIGRAM(S): 500 TABLET, COATED ORAL at 08:48

## 2024-11-21 RX ADMIN — Medication 8: at 07:41

## 2024-11-21 NOTE — PROGRESS NOTE ADULT - SUBJECTIVE AND OBJECTIVE BOX
INTERVAL EVENTS:  Follow up diabetes management. Glucoses elevated, NPO today for procedure.     MEDICATIONS  (STANDING):  dextrose 5%. 1000 milliLiter(s) (100 mL/Hr) IV Continuous <Continuous>  dextrose 5%. 1000 milliLiter(s) (100 mL/Hr) IV Continuous <Continuous>  dextrose 5%. 1000 milliLiter(s) (50 mL/Hr) IV Continuous <Continuous>  dextrose 5%. 1000 milliLiter(s) (50 mL/Hr) IV Continuous <Continuous>  dextrose 50% Injectable 25 Gram(s) IV Push once  dextrose 50% Injectable 12.5 Gram(s) IV Push once  dextrose 50% Injectable 25 Gram(s) IV Push once  dextrose 50% Injectable 25 Gram(s) IV Push once  dextrose 50% Injectable 12.5 Gram(s) IV Push once  dextrose 50% Injectable 25 Gram(s) IV Push once  enoxaparin Injectable 40 milliGRAM(s) SubCutaneous every 24 hours  glucagon  Injectable 1 milliGRAM(s) IntraMuscular once  glucagon  Injectable 1 milliGRAM(s) IntraMuscular once  influenza   Vaccine 0.5 milliLiter(s) IntraMuscular once  insulin glargine Injectable (LANTUS) 42 Unit(s) SubCutaneous at bedtime  insulin lispro (ADMELOG) corrective regimen sliding scale   SubCutaneous three times a day before meals  metoprolol succinate ER 50 milliGRAM(s) Oral daily  piperacillin/tazobactam IVPB.. 3.375 Gram(s) IV Intermittent every 8 hours  rosuvastatin 40 milliGRAM(s) Oral at bedtime  sodium chloride 0.9%. 1000 milliLiter(s) (100 mL/Hr) IV Continuous <Continuous>  valsartan 320 milliGRAM(s) Oral daily  vancomycin  IVPB 1500 milliGRAM(s) IV Intermittent every 12 hours    MEDICATIONS  (PRN):  acetaminophen     Tablet .. 650 milliGRAM(s) Oral every 6 hours PRN Temp greater or equal to 38C (100.4F), Mild Pain (1 - 3)  albuterol    0.083% 2.5 milliGRAM(s) Nebulizer every 6 hours PRN Shortness of Breath and/or Wheezing  albuterol    90 MICROgram(s) HFA Inhaler 1 Puff(s) Inhalation every 4 hours PRN Shortness of Breath and/or Wheezing  dextrose Oral Gel 15 Gram(s) Oral once PRN Blood Glucose LESS THAN 70 milliGRAM(s)/deciliter  dextrose Oral Gel 15 Gram(s) Oral once PRN Blood Glucose LESS THAN 70 milliGRAM(s)/deciliter    Allergies  No Known Allergies    Vital Signs Last 24 Hrs  T(C): 36.9 (21 Nov 2024 04:53), Max: 36.9 (20 Nov 2024 20:52)  T(F): 98.4 (21 Nov 2024 04:53), Max: 98.4 (20 Nov 2024 20:52)  HR: 71 (21 Nov 2024 10:35) (71 - 80)  BP: 169/98 (21 Nov 2024 10:35) (154/79 - 169/98)  BP(mean): --  RR: 18 (21 Nov 2024 04:53) (18 - 18)  SpO2: 97% (21 Nov 2024 04:53) (97% - 98%)    Parameters below as of 21 Nov 2024 04:53  Patient On (Oxygen Delivery Method): room air    PHYSICAL EXAM:  General: No apparent distress  Respiratory: Lungs clear bilaterally  Cardiac: +S1, S2, no m/r/g  GI: +BS, soft, non tender, non distended  Extremities: right foot in dressing  Neuro: A+O X3    LABS:                        8.6    7.84  )-----------( 361      ( 21 Nov 2024 06:05 )             26.0     11-21    135  |  101  |  6.6[L]  ----------------------------<  284[H]  4.0   |  23.0  |  0.76    Ca    8.2[L]      21 Nov 2024 06:05  Phos  3.0     11-21  Mg     1.6     11-21      Urinalysis Basic - ( 21 Nov 2024 06:05 )    Color: x / Appearance: x / SG: x / pH: x  Gluc: 284 mg/dL / Ketone: x  / Bili: x / Urobili: x   Blood: x / Protein: x / Nitrite: x   Leuk Esterase: x / RBC: x / WBC x   Sq Epi: x / Non Sq Epi: x / Bacteria: x    POCT Blood Glucose.: 316 mg/dL (11-21-24 @ 07:40)  POCT Blood Glucose.: 328 mg/dL (11-20-24 @ 22:08)  POCT Blood Glucose.: 298 mg/dL (11-20-24 @ 16:13)  POCT Blood Glucose.: 366 mg/dL (11-20-24 @ 11:56)

## 2024-11-21 NOTE — BRIEF OPERATIVE NOTE - NSICDXBRIEFPROCEDURE_GEN_ALL_CORE_FT
PROCEDURES:  Delayed primary closure of foot 21-Nov-2024 19:03:36  Brooke Llamas  
PROCEDURES:  Detachment at right foot, partial 4th ray, open approach 18-Nov-2024 18:32:49  Brooke Llamas  Detachment at right foot, partial 5th ray, open approach 18-Nov-2024 18:33:08  Brooke Llamas

## 2024-11-21 NOTE — PROGRESS NOTE ADULT - ASSESSMENT
A:  R foot necrotic 5th digit  S/P right partial 4th and 5th ray amputation  11/18    P:   Patient seen and evaluated at bedside and Chart reviewed.  leukocytosis present with vitals stable  Discussed diagnosis and treatment with patient  X-rays evaluated  CT reviewed- results noted above   Venous duplex negative   Culture and Intraop culture results pending   Appreciate ID recommendations  Appreciate vascular recommendations  Surgicell, packing, guaze, ABD, kerlix and ACE applied  Patient to keep the dressing clean, dry and intact  Patient is scheduled to go back to OR today Thursday 11/21 for wound debridement with delay primary closure. Patient is aware and amenable.  Please keep patient medically optimized.   NPO   T&S appreciated  Patient to be nonweight bearing. Recommend staying in bed with leg elevated at all times due to bleeding from foot.  Discussed importance of daily foot examinations and proper shoe gear and to importance of lower Fasting Blood Glucose levels.   Podiatry to follow while in house.  Discussed with Dr. Vidal

## 2024-11-21 NOTE — BRIEF OPERATIVE NOTE - OPERATION/FINDINGS
See operative report.   Patient underwent wound debridement and delay primary closure. Tissue was noted to be healthy and free of infection. Healthy bleeding was noted. Incision was closed completely.   Excisional debridment of tissue down to the level of subcutaneous tissue. No more bone was removed.

## 2024-11-21 NOTE — BRIEF OPERATIVE NOTE - NSICDXBRIEFPREOP_GEN_ALL_CORE_FT
PRE-OP DIAGNOSIS:  Gas gangrene 18-Nov-2024 18:33:23  Brooke Llamas  
PRE-OP DIAGNOSIS:  Gas gangrene 18-Nov-2024 18:33:23  Brooke Llamas

## 2024-11-21 NOTE — PROGRESS NOTE ADULT - ASSESSMENT
54yoM with PMHx of hypertension, hyperlipidemia, diabetes, and asthma with neuropathy, who presented with a two week history of right toe discoloration and leg swelling with malodorous odor.    #Sepsis / Cellulitis  - s/p I&D with podiatry   - Xray of the foot: Diffuse soft tissue swelling with subcutaneous air. Suspect pathologic fracture of the fifth proximal phalanx. Second and third metatarsal bone diaphyseal periostitis. Pes planus. Suspect anterior talus have fracture  - s/p right foot partial 4th and 5th ray resection, left open  - zosyn, vanc   - Blood culture pending   - LE duplex to r/o DVT: negative   - ELLIE, adequate flow in both LE   - scheduled for OR today  - RCRI: 1 points 6% risk of major cardiac event  - ARISCAT: 14 points 1.6% of in hospital post op pulm complications  - pt at moderate risk for surgery; medically optimized  - IS, telemetry post op    - nonweight bearing, elevating leg today     #Diabetes  - Metformin to be held  - holding insulin pump  - sugars in 400s  - endocrine consulted, recs appreciated  - given NPO status, admelog stopped, ISS increased to high dose; lantus increased to 42u qhs; when admelog restarts will increase to 20u TID  - continue to monitor     #Hypertension  - Close blood pressure monitoring  - On metoprolol and valsartan    #Anemia  - The patient reported a history of anemia in the past for which he was taking B12 and multivitamin  - Monitoring hemoglobin levels    #OMID, likely pre-renal, resolved   - Cr 1.36, improved to 0.93 w/ IVF     Dispo: medically acute, pending OR today; on iv abx   54yoM with PMHx of hypertension, hyperlipidemia, diabetes, and asthma with neuropathy, who presented with a two week history of right toe discoloration and leg swelling with malodorous odor.    #Sepsis / Cellulitis  - s/p I&D with podiatry   - Xray of the foot: Diffuse soft tissue swelling with subcutaneous air. Suspect pathologic fracture of the fifth proximal phalanx. Second and third metatarsal bone diaphyseal periostitis. Pes planus. Suspect anterior talus have fracture  - s/p right foot partial 4th and 5th ray resection, left open  - zosyn, vanc   - Blood culture pending   - LE duplex to r/o DVT: negative   - ELLIE, adequate flow in both LE   - scheduled for OR today  - RCRI: 1 points 6% risk of major cardiac event  - ARISCAT: 14 points 1.6% of in hospital post op pulm complications  - pt at moderate risk for surgery; medically optimized  - IS, telemetry post op    - nonweight bearing, elevating leg today     #Diabetes  - Metformin to be held  - holding insulin pump  - sugars in 400s  - endocrine consulted, recs appreciated  - given NPO status, admelog stopped, ISS increased to high dose; lantus increased to 42u qhs; when admelog restarts will increase to 20u TID  - continue to monitor     #Hypertension  - Close blood pressure monitoring  - On metoprolol and valsartan    #Anemia  - The patient reported a history of anemia in the past for which he was taking B12 and multivitamin  - Monitoring hemoglobin levels    #OMID, likely pre-renal, resolved   - Cr 1.36, improved to 0.93 w/ IVF     Dispo: medically acute, pending OR today; on iv abx - need final ID recs pending final cx; plan for home with home care, has sister who is very involved

## 2024-11-21 NOTE — PROGRESS NOTE ADULT - TIME BILLING
reviewing the chart documentation, reviewing labs and imaging studies, evaluating the patient, discussing the plan of care with the consultants & medical team, and documenting.

## 2024-11-21 NOTE — PROGRESS NOTE ADULT - ASSESSMENT
54M with PMHx HTN, HLD, DM, asthma, neuropathy, presented with right toe discoloration and leg swelling. S/p right partial 4th and 5th ray amputation.    Consulted for diabetes management  Home regimen: States he was on insulin pump prior to admission, last office notes have basal/bolus dosing  Current a1c: 9.1%  Outpatient Endocrinologist: Dr Cross    1. Uncontrolled DM with hyperglycemia  - NPO today, increase admelog coverage scale to high dose due to hyperglycemia  - Increase lantus to 42 units qhs  - When restarting admelog, increase to 20 units TID  - Has follow up with our office 12/31     2. Right foot wound  - S/p right partial 4th and 5th ray amputation  - Patient is scheduled to go back to OR tomorrow today for wound debridement with delay primary closure  - Care per podiatry    3. HLD  - Continue statin

## 2024-11-21 NOTE — PROGRESS NOTE ADULT - ASSESSMENT
54M who presented with continued right foot swelling and fifth toe discoloration. The patient reported that he had first noted a blister to the fifth toe two weeks prior and was evaluated by his primary care physician and podiatrist without further intervention. The patient reported that the blister opened with drainage of fluid. He denied any pain but reported that he had decreased sensation due to neuropathy. He did not have any fevers or chills but noted a malodorous odor from the foot.. He continued to put a dressing on the toe but the discoloration to the toe and swelling to the ankle did not improve. He denied any recent injury to the toe and denied any similar findings on the other toes.  AS ABOVE WITH NECROTIC CHANGES TO FOOT AS PER PHOTO OF PT  PT WITH FOUL SMELLING FOOT  AND WAS BROUGHT TO THE OR YESTERDAY 11/18  PT S/P 4TH AND 5TH TOE Amputation  PUS NOTED GANGRENOUS TOE REMOVED   OPERATIVE CX  ARE NEGATIVE SO FAR   WILL CONTINUE ZOSYN/VANCO     PATIENT FOR OR TODAY  WILL JESUS

## 2024-11-21 NOTE — PROGRESS NOTE ADULT - SUBJECTIVE AND OBJECTIVE BOX
[facility] Division of Hospital Medicine    INTERVAL HISTORY:  Overnight, no acute events.     Patient seen and examined at bedside this morning. Patient denies chest pain, SOB, abd pain, N/V, fever, chills, dysuria or any other complaints. Upset at how late surgery is going to be as he wants to eat. Reports wanting to have a neb treatment because he has nasal congestion, asking for tissues.     MEDICATIONS  (STANDING):  dextrose 5%. 1000 milliLiter(s) (100 mL/Hr) IV Continuous <Continuous>  dextrose 5%. 1000 milliLiter(s) (100 mL/Hr) IV Continuous <Continuous>  dextrose 5%. 1000 milliLiter(s) (50 mL/Hr) IV Continuous <Continuous>  dextrose 5%. 1000 milliLiter(s) (50 mL/Hr) IV Continuous <Continuous>  dextrose 50% Injectable 25 Gram(s) IV Push once  dextrose 50% Injectable 12.5 Gram(s) IV Push once  dextrose 50% Injectable 25 Gram(s) IV Push once  dextrose 50% Injectable 25 Gram(s) IV Push once  dextrose 50% Injectable 12.5 Gram(s) IV Push once  dextrose 50% Injectable 25 Gram(s) IV Push once  enoxaparin Injectable 40 milliGRAM(s) SubCutaneous every 24 hours  glucagon  Injectable 1 milliGRAM(s) IntraMuscular once  glucagon  Injectable 1 milliGRAM(s) IntraMuscular once  influenza   Vaccine 0.5 milliLiter(s) IntraMuscular once  insulin glargine Injectable (LANTUS) 42 Unit(s) SubCutaneous at bedtime  insulin lispro (ADMELOG) corrective regimen sliding scale   SubCutaneous three times a day before meals  metoprolol succinate ER 50 milliGRAM(s) Oral daily  piperacillin/tazobactam IVPB.. 3.375 Gram(s) IV Intermittent every 8 hours  rosuvastatin 40 milliGRAM(s) Oral at bedtime  sodium chloride 0.9%. 1000 milliLiter(s) (100 mL/Hr) IV Continuous <Continuous>  valsartan 320 milliGRAM(s) Oral daily  vancomycin  IVPB 1500 milliGRAM(s) IV Intermittent every 12 hours    MEDICATIONS  (PRN):  acetaminophen     Tablet .. 650 milliGRAM(s) Oral every 6 hours PRN Temp greater or equal to 38C (100.4F), Mild Pain (1 - 3)  albuterol    0.083% 2.5 milliGRAM(s) Nebulizer every 6 hours PRN Shortness of Breath and/or Wheezing  albuterol    90 MICROgram(s) HFA Inhaler 1 Puff(s) Inhalation every 4 hours PRN Shortness of Breath and/or Wheezing  dextrose Oral Gel 15 Gram(s) Oral once PRN Blood Glucose LESS THAN 70 milliGRAM(s)/deciliter  dextrose Oral Gel 15 Gram(s) Oral once PRN Blood Glucose LESS THAN 70 milliGRAM(s)/deciliter        I&O's Summary    20 Nov 2024 07:01  -  21 Nov 2024 07:00  --------------------------------------------------------  IN: 2450 mL / OUT: 1350 mL / NET: 1100 mL    21 Nov 2024 07:01  -  21 Nov 2024 13:21  --------------------------------------------------------  IN: 600 mL / OUT: 2830 mL / NET: -2230 mL        PHYSICAL EXAM:  Vital Signs Last 24 Hrs  T(C): 36.7 (21 Nov 2024 10:35), Max: 36.9 (20 Nov 2024 20:52)  T(F): 98.1 (21 Nov 2024 10:35), Max: 98.4 (20 Nov 2024 20:52)  HR: 71 (21 Nov 2024 10:35) (71 - 80)  BP: 169/98 (21 Nov 2024 10:35) (154/79 - 169/98)  BP(mean): --  RR: 18 (21 Nov 2024 10:35) (18 - 18)  SpO2: 99% (21 Nov 2024 10:35) (97% - 99%)    Parameters below as of 21 Nov 2024 10:35  Patient On (Oxygen Delivery Method): room air      CONSTITUTIONAL: No apparent distress  HEENT: Normocephalic, Atraumatic.   RESPIRATORY:  lungs are clear to auscultation bilaterally, No crackles, rhonchi, wheezes  CARDIOVASCULAR: Regular rate and rhythm  EXT: +LLE edema, L foot wrapped and bleeding through dressing  ABDOMEN: Soft, non-distended, nontender to palpation, +BS  MUSCLOSKELETAL: moving all 4 extremities spontaneously  PSYCH: thoughts linear, affect appropriate  NEUROLOGY: Alert, Oriented x3    LABS:                        8.6    7.84  )-----------( 361      ( 21 Nov 2024 06:05 )             26.0     11-21    135  |  101  |  6.6[L]  ----------------------------<  284[H]  4.0   |  23.0  |  0.76    Ca    8.2[L]      21 Nov 2024 06:05  Phos  3.0     11-21  Mg     1.6     11-21            Urinalysis Basic - ( 21 Nov 2024 06:05 )    Color: x / Appearance: x / SG: x / pH: x  Gluc: 284 mg/dL / Ketone: x  / Bili: x / Urobili: x   Blood: x / Protein: x / Nitrite: x   Leuk Esterase: x / RBC: x / WBC x   Sq Epi: x / Non Sq Epi: x / Bacteria: x        Culture - Wound Aerobic/Anaerobic (collected 18 Nov 2024 21:21)  Source: .Surgical Swab  Preliminary Report (21 Nov 2024 11:48):    Few Streptococcus constellatus    "Susceptibilities not performed"    Culture - Tissue with Gram Stain (collected 18 Nov 2024 21:20)  Source: Tissue  Gram Stain (19 Nov 2024 06:14):    No polymorphonuclear cells seen per low power field    No organisms seen per oil power field  Preliminary Report (20 Nov 2024 07:10):    No growth    Culture - Tissue with Gram Stain (collected 18 Nov 2024 21:19)  Source: Tissue  Gram Stain (19 Nov 2024 06:14):    No polymorphonuclear cells seen per low power field    No organisms seen per oil power field  Preliminary Report (20 Nov 2024 07:25):    No growth      CAPILLARY BLOOD GLUCOSE      POCT Blood Glucose.: 304 mg/dL (21 Nov 2024 11:08)  POCT Blood Glucose.: 316 mg/dL (21 Nov 2024 07:40)  POCT Blood Glucose.: 328 mg/dL (20 Nov 2024 22:08)  POCT Blood Glucose.: 298 mg/dL (20 Nov 2024 16:13)        RADIOLOGY & ADDITIONAL TESTS:  Results Reviewed

## 2024-11-21 NOTE — PROGRESS NOTE ADULT - SUBJECTIVE AND OBJECTIVE BOX
Interval: Patient was seen resting in bed. Patient s/p right foot partial 4th and 5th ray resection. Patient was satisfied with procedure as his swelling has gone down in his leg as well as his pain levels. Patient is going to OR today for delay primary closure of his incision. Patient aware and amenable.    HPI:  A 54-year-old male with a history of neuropathy and recurrent foot ulcers presents with right foot swelling and fifth toe discoloration. He developed a blister on the fifth toe on November 8th, which subsequently ruptured with fluid drainage. Despite regular dressing changes, the discoloration and ankle swelling persisted. He denies pain, fever, or chills but reports a malodorous odor from the foot. Prior evaluations by his PCP and podiatrist resulted in no intervention. He denied any recent injury to the toe and denied any similar findings on the other toes.    Medications acetaminophen     Tablet .. 650 milliGRAM(s) Oral every 6 hours PRN  albuterol    0.083% 2.5 milliGRAM(s) Nebulizer every 6 hours PRN  albuterol    90 MICROgram(s) HFA Inhaler 1 Puff(s) Inhalation every 4 hours PRN  dextrose 5%. 1000 milliLiter(s) IV Continuous <Continuous>  dextrose 5%. 1000 milliLiter(s) IV Continuous <Continuous>  dextrose 5%. 1000 milliLiter(s) IV Continuous <Continuous>  dextrose 5%. 1000 milliLiter(s) IV Continuous <Continuous>  dextrose 50% Injectable 25 Gram(s) IV Push once  dextrose 50% Injectable 12.5 Gram(s) IV Push once  dextrose 50% Injectable 25 Gram(s) IV Push once  dextrose 50% Injectable 25 Gram(s) IV Push once  dextrose 50% Injectable 12.5 Gram(s) IV Push once  dextrose 50% Injectable 25 Gram(s) IV Push once  dextrose Oral Gel 15 Gram(s) Oral once PRN  dextrose Oral Gel 15 Gram(s) Oral once PRN  enoxaparin Injectable 40 milliGRAM(s) SubCutaneous every 24 hours  glucagon  Injectable 1 milliGRAM(s) IntraMuscular once  glucagon  Injectable 1 milliGRAM(s) IntraMuscular once  influenza   Vaccine 0.5 milliLiter(s) IntraMuscular once  insulin glargine Injectable (LANTUS) 35 Unit(s) SubCutaneous at bedtime  insulin lispro (ADMELOG) corrective regimen sliding scale   SubCutaneous three times a day before meals  metoprolol succinate ER 50 milliGRAM(s) Oral daily  piperacillin/tazobactam IVPB.. 3.375 Gram(s) IV Intermittent every 8 hours  rosuvastatin 40 milliGRAM(s) Oral at bedtime  sodium chloride 0.9%. 1000 milliLiter(s) IV Continuous <Continuous>  valsartan 320 milliGRAM(s) Oral daily  vancomycin  IVPB 1500 milliGRAM(s) IV Intermittent every 12 hours    FH,   PMH   PSH    Labs                          8.6    7.84  )-----------( 361      ( 21 Nov 2024 06:05 )             26.0      11-21    135  |  101  |  6.6[L]  ----------------------------<  284[H]  4.0   |  23.0  |  0.76    Ca    8.2[L]      21 Nov 2024 06:05  Phos  3.0     11-21  Mg     1.6     11-21    Vital Signs Last 24 Hrs  T(C): 36.9 (21 Nov 2024 04:53), Max: 36.9 (20 Nov 2024 20:52)  T(F): 98.4 (21 Nov 2024 04:53), Max: 98.4 (20 Nov 2024 20:52)  HR: 71 (21 Nov 2024 10:35) (71 - 80)  BP: 169/98 (21 Nov 2024 10:35) (154/79 - 169/98)  BP(mean): --  RR: 18 (21 Nov 2024 04:53) (18 - 18)  SpO2: 97% (21 Nov 2024 04:53) (97% - 98%)    Parameters below as of 21 Nov 2024 04:53  Patient On (Oxygen Delivery Method): room air    Sedimentation Rate, Erythrocyte: 114 mm/hr (11-19-24 @ 03:58)  Sedimentation Rate, Erythrocyte: 98 mm/hr (11-17-24 @ 05:30)         C-Reactive Protein: 239 mg/L (11-19-24 @ 03:58)  C-Reactive Protein: 401 mg/L (11-17-24 @ 05:30)   WBC Count: 7.84 K/uL (11-21-24 @ 06:05)    PHYSICAL EXAM  LE Focused:    Vasc:  DP & PT non-palpable b/l. CFT<3 seconds to all 10 digits b/l. TG warm to warm b/l  Derm: Right foot 5th digit wound. Necrotic and fluctuant. Wound probes to bone. Soft tissue crepitus present, increased malodor, 5cc of seropurulent drainage noted upon expression of the wound. Erythema noted spanning from R 5th digit to ankle.   Neuro: Gross and light touch sensation diminshed b/l  MSK: No pain on palpation to foot. Able to move remaining digits.     IMAGING:< from: CT Lower Extremity No Cont, Right (11.17.24 @ 23:42) >  IMPRESSION:  1.  Osseous destruction is again seen around the 5th proximal   interphalangeal joint with intraosseous gas consistent with emphysematous   osteomyelitis.  2.  Surrounding soft tissue gas is present with soft tissue wound   extending along the dorsum of the 4th webspace to the level of the 4th   and 5th metatarsal necks with presumed packing material versus complex   gas.  3.  Changes of the ankle and midfoot suggest early Charcot arthropathy.  4.  Right inguinal/groin adenopathy.    < end of copied text >      CULTURES: pending

## 2024-11-21 NOTE — PROGRESS NOTE ADULT - SUBJECTIVE AND OBJECTIVE BOX
INFECTIOUS DISEASES AND INTERNAL MEDICINE at [place]  =======================================================  Chris Voss MD  Diplomates American Board of Internal Medicine and Infectious Diseases  Telephone [phone]  Fax            [phone]  =======================================================    JANIS LEON [id number]    Follow up: right foot diabetic foot osteo    Allergies:  No Known Allergies      Medications:  acetaminophen     Tablet .. 650 milliGRAM(s) Oral every 6 hours PRN  albuterol    0.083% 2.5 milliGRAM(s) Nebulizer every 6 hours PRN  albuterol    90 MICROgram(s) HFA Inhaler 1 Puff(s) Inhalation every 4 hours PRN  dextrose 5%. 1000 milliLiter(s) IV Continuous <Continuous>  dextrose 5%. 1000 milliLiter(s) IV Continuous <Continuous>  dextrose 5%. 1000 milliLiter(s) IV Continuous <Continuous>  dextrose 5%. 1000 milliLiter(s) IV Continuous <Continuous>  dextrose 50% Injectable 25 Gram(s) IV Push once  dextrose 50% Injectable 12.5 Gram(s) IV Push once  dextrose 50% Injectable 25 Gram(s) IV Push once  dextrose 50% Injectable 25 Gram(s) IV Push once  dextrose 50% Injectable 12.5 Gram(s) IV Push once  dextrose 50% Injectable 25 Gram(s) IV Push once  dextrose Oral Gel 15 Gram(s) Oral once PRN  dextrose Oral Gel 15 Gram(s) Oral once PRN  enoxaparin Injectable 40 milliGRAM(s) SubCutaneous every 24 hours  glucagon  Injectable 1 milliGRAM(s) IntraMuscular once  glucagon  Injectable 1 milliGRAM(s) IntraMuscular once  influenza   Vaccine 0.5 milliLiter(s) IntraMuscular once  insulin glargine Injectable (LANTUS) 30 Unit(s) SubCutaneous at bedtime  insulin lispro (ADMELOG) corrective regimen sliding scale   SubCutaneous three times a day before meals  insulin lispro Injectable (ADMELOG) 5 Unit(s) SubCutaneous three times a day before meals  metoprolol succinate ER 50 milliGRAM(s) Oral daily  piperacillin/tazobactam IVPB.. 3.375 Gram(s) IV Intermittent every 8 hours  rosuvastatin 40 milliGRAM(s) Oral at bedtime  sodium chloride 0.9%. 1000 milliLiter(s) IV Continuous <Continuous>  valsartan 320 milliGRAM(s) Oral daily  vancomycin  IVPB 1500 milliGRAM(s) IV Intermittent every 12 hours    SOCIAL       FAMILY   FAMILY HISTORY:    REVIEW OF SYSTEMS:  CONSTITUTIONAL:  No Fever or chills  HEENT:   No diplopia or blurred vision.  No earache, sore throat or runny nose.  CARDIOVASCULAR:  No pressure, squeezing, strangling, tightness, heaviness or aching about the chest, neck, axilla or epigastrium.  RESPIRATORY:  No cough, shortness of breath, PND or orthopnea.  GASTROINTESTINAL:  No nausea, vomiting or diarrhea.  GENITOURINARY:  No dysuria, frequency or urgency. No Blood in urine  MUSCULOSKELETAL:   aS   Per HPI  NEUROLOGIC:  No paresthesias, fasciculations, seizures or weakness.  PSYCHIATRIC:  No disorder of thought or mood.  ENDOCRINE:  No heat or cold intolerance, polyuria or polydipsia.  HEMATOLOGICAL:  No easy bruising or bleeding.            Physical Exam:    Vital Signs Last 24 Hrs  T(C): 36.9 (21 Nov 2024 04:53), Max: 36.9 (20 Nov 2024 20:52)  T(F): 98.4 (21 Nov 2024 04:53), Max: 98.4 (20 Nov 2024 20:52)  HR: 80 (21 Nov 2024 04:53) (73 - 80)  BP: 167/80 (21 Nov 2024 04:53) (154/79 - 167/80)  BP(mean): --  RR: 18 (21 Nov 2024 04:53) (18 - 18)  SpO2: 97% (21 Nov 2024 04:53) (97% - 98%)    Parameters below as of 21 Nov 2024 04:53  Patient On (Oxygen Delivery Method): room air                  GEN: NAD,   HEENT: normocephalic and atraumatic. EOMI. CARTER.    NECK: Supple. No carotid bruits.  No lymphadenopathy or thyromegaly.  LUNGS: Clear to auscultation.  HEART: Regular rate and rhythm without murmur.  ABDOMEN: Soft, nontender, and nondistended.  Positive bowel sounds.    : No CVA tenderness  EXTREMITIES: RIGHT FOOT   S/P 4TH ,5TH AMPUITATION  DECREASED EDEMA SURGICAL SITE APPEARS CLEAN NO ODOR   MSK: no joint swelling  NEUROLOGIC: Cranial nerves II through XII are grossly intact.  PSYCHIATRIC: Appropriate affect .  SKIN: No ulceration or induration present.        Labs:  Vitals:   =======================================================  Current Antibiotics:  piperacillin/tazobactam IVPB.. 3.375 Gram(s) IV Intermittent every 8 hours  vancomycin  IVPB 1500 milliGRAM(s) IV Intermittent every 12 hours    Other medications:  dextrose 5%. 1000 milliLiter(s) IV Continuous <Continuous>  dextrose 5%. 1000 milliLiter(s) IV Continuous <Continuous>  dextrose 5%. 1000 milliLiter(s) IV Continuous <Continuous>  dextrose 5%. 1000 milliLiter(s) IV Continuous <Continuous>  dextrose 50% Injectable 25 Gram(s) IV Push once  dextrose 50% Injectable 12.5 Gram(s) IV Push once  dextrose 50% Injectable 25 Gram(s) IV Push once  dextrose 50% Injectable 25 Gram(s) IV Push once  dextrose 50% Injectable 12.5 Gram(s) IV Push once  dextrose 50% Injectable 25 Gram(s) IV Push once  enoxaparin Injectable 40 milliGRAM(s) SubCutaneous every 24 hours  glucagon  Injectable 1 milliGRAM(s) IntraMuscular once  glucagon  Injectable 1 milliGRAM(s) IntraMuscular once  influenza   Vaccine 0.5 milliLiter(s) IntraMuscular once  insulin glargine Injectable (LANTUS) 30 Unit(s) SubCutaneous at bedtime  insulin lispro (ADMELOG) corrective regimen sliding scale   SubCutaneous three times a day before meals  insulin lispro Injectable (ADMELOG) 5 Unit(s) SubCutaneous three times a day before meals  metoprolol succinate ER 50 milliGRAM(s) Oral daily  rosuvastatin 40 milliGRAM(s) Oral at bedtime  sodium chloride 0.9%. 1000 milliLiter(s) IV Continuous <Continuous>  valsartan 320 milliGRAM(s) Oral daily      =======================================================  Labs:                                 8.6    7.84  )-----------( 361      ( 21 Nov 2024 06:05 )             26.0   11-21    135  |  101  |  6.6[L]  ----------------------------<  284[H]  4.0   |  23.0  |  0.76    Ca    8.2[L]      21 Nov 2024 06:05  Phos  3.0     11-21  Mg     1.6     11-21              Culture - Tissue with Gram Stain (collected 11-18-24 @ 21:20)  Source: Tissue  Gram Stain (11-19-24 @ 06:14):    No polymorphonuclear cells seen per low power field    No organisms seen per oil power field    Culture - Tissue with Gram Stain (collected 11-18-24 @ 21:19)  Source: Tissue  Gram Stain (11-19-24 @ 06:14):    No polymorphonuclear cells seen per low power field    No organisms seen per oil power field    Culture - Wound Aerobic/Anaerobic (collected 11-17-24 @ 13:05)  Source: Skin/Wound  Preliminary Report (11-18-24 @ 22:05):    Moderate Streptococcus constellatus    "Susceptibilities not performed"    Culture - Blood (collected 11-17-24 @ 05:30)  Source: .Blood BLOOD  Preliminary Report (11-19-24 @ 09:01):    No growth at 48 Hours      Creatinine: 0.87 mg/dL (11-19-24 @ 03:58)  Creatinine: 0.93 mg/dL (11-18-24 @ 03:56)  Creatinine: 1.36 mg/dL (11-17-24 @ 05:30)    Procalcitonin: 0.28 ng/mL (11-17-24 @ 15:00)        C-Reactive Protein: 239 mg/L (11-19-24 @ 03:58)  C-Reactive Protein: 401 mg/L (11-17-24 @ 05:30)    WBC Count: 8.22 K/uL (11-19-24 @ 03:58)  WBC Count: 9.42 K/uL (11-18-24 @ 03:56)  WBC Count: 13.22 K/uL (11-17-24 @ 05:30)        Alkaline Phosphatase: 137 U/L (11-17-24 @ 05:30)  Alanine Aminotransferase (ALT/SGPT): 28 U/L (11-17-24 @ 05:30)  Aspartate Aminotransferase (AST/SGOT): 26 U/L (11-17-24 @ 05:30)  Bilirubin Total: 0.4 mg/dL (11-17-24 @ 05:30)

## 2024-11-22 ENCOUNTER — TRANSCRIPTION ENCOUNTER (OUTPATIENT)
Age: 54
End: 2024-11-22

## 2024-11-22 VITALS
SYSTOLIC BLOOD PRESSURE: 164 MMHG | TEMPERATURE: 98 F | RESPIRATION RATE: 18 BRPM | OXYGEN SATURATION: 98 % | DIASTOLIC BLOOD PRESSURE: 84 MMHG | HEART RATE: 86 BPM

## 2024-11-22 LAB
ANION GAP SERPL CALC-SCNC: 12 MMOL/L — SIGNIFICANT CHANGE UP (ref 5–17)
BUN SERPL-MCNC: 10 MG/DL — SIGNIFICANT CHANGE UP (ref 8–20)
CALCIUM SERPL-MCNC: 8.7 MG/DL — SIGNIFICANT CHANGE UP (ref 8.4–10.5)
CHLORIDE SERPL-SCNC: 100 MMOL/L — SIGNIFICANT CHANGE UP (ref 96–108)
CO2 SERPL-SCNC: 26 MMOL/L — SIGNIFICANT CHANGE UP (ref 22–29)
CREAT SERPL-MCNC: 0.95 MG/DL — SIGNIFICANT CHANGE UP (ref 0.5–1.3)
CULTURE RESULTS: SIGNIFICANT CHANGE UP
EGFR: 96 ML/MIN/1.73M2 — SIGNIFICANT CHANGE UP
GLUCOSE BLDC GLUCOMTR-MCNC: 293 MG/DL — HIGH (ref 70–99)
GLUCOSE BLDC GLUCOMTR-MCNC: 304 MG/DL — HIGH (ref 70–99)
GLUCOSE BLDC GLUCOMTR-MCNC: 332 MG/DL — HIGH (ref 70–99)
GLUCOSE BLDC GLUCOMTR-MCNC: 351 MG/DL — HIGH (ref 70–99)
GLUCOSE BLDC GLUCOMTR-MCNC: 382 MG/DL — HIGH (ref 70–99)
GLUCOSE SERPL-MCNC: 335 MG/DL — HIGH (ref 70–99)
HCT VFR BLD CALC: 24.8 % — LOW (ref 39–50)
HGB BLD-MCNC: 8.2 G/DL — LOW (ref 13–17)
MAGNESIUM SERPL-MCNC: 1.7 MG/DL — SIGNIFICANT CHANGE UP (ref 1.6–2.6)
MCHC RBC-ENTMCNC: 29.3 PG — SIGNIFICANT CHANGE UP (ref 27–34)
MCHC RBC-ENTMCNC: 33.1 G/DL — SIGNIFICANT CHANGE UP (ref 32–36)
MCV RBC AUTO: 88.6 FL — SIGNIFICANT CHANGE UP (ref 80–100)
PHOSPHATE SERPL-MCNC: 3.3 MG/DL — SIGNIFICANT CHANGE UP (ref 2.4–4.7)
PLATELET # BLD AUTO: 389 K/UL — SIGNIFICANT CHANGE UP (ref 150–400)
POTASSIUM SERPL-MCNC: 4.2 MMOL/L — SIGNIFICANT CHANGE UP (ref 3.5–5.3)
POTASSIUM SERPL-SCNC: 4.2 MMOL/L — SIGNIFICANT CHANGE UP (ref 3.5–5.3)
RBC # BLD: 2.8 M/UL — LOW (ref 4.2–5.8)
RBC # FLD: 13.2 % — SIGNIFICANT CHANGE UP (ref 10.3–14.5)
SODIUM SERPL-SCNC: 137 MMOL/L — SIGNIFICANT CHANGE UP (ref 135–145)
SPECIMEN SOURCE: SIGNIFICANT CHANGE UP
VANCOMYCIN TROUGH SERPL-MCNC: 8 UG/ML — LOW (ref 10–20)
WBC # BLD: 9.47 K/UL — SIGNIFICANT CHANGE UP (ref 3.8–10.5)
WBC # FLD AUTO: 9.47 K/UL — SIGNIFICANT CHANGE UP (ref 3.8–10.5)

## 2024-11-22 PROCEDURE — 88311 DECALCIFY TISSUE: CPT

## 2024-11-22 PROCEDURE — 83735 ASSAY OF MAGNESIUM: CPT

## 2024-11-22 PROCEDURE — 80202 ASSAY OF VANCOMYCIN: CPT

## 2024-11-22 PROCEDURE — 84100 ASSAY OF PHOSPHORUS: CPT

## 2024-11-22 PROCEDURE — 85025 COMPLETE CBC W/AUTO DIFF WBC: CPT

## 2024-11-22 PROCEDURE — 80053 COMPREHEN METABOLIC PANEL: CPT

## 2024-11-22 PROCEDURE — 99232 SBSQ HOSP IP/OBS MODERATE 35: CPT | Mod: 24

## 2024-11-22 PROCEDURE — 93923 UPR/LXTR ART STDY 3+ LVLS: CPT

## 2024-11-22 PROCEDURE — 82010 KETONE BODYS QUAN: CPT

## 2024-11-22 PROCEDURE — 93971 EXTREMITY STUDY: CPT

## 2024-11-22 PROCEDURE — 87077 CULTURE AEROBIC IDENTIFY: CPT

## 2024-11-22 PROCEDURE — 85610 PROTHROMBIN TIME: CPT

## 2024-11-22 PROCEDURE — 86900 BLOOD TYPING SEROLOGIC ABO: CPT

## 2024-11-22 PROCEDURE — 82962 GLUCOSE BLOOD TEST: CPT

## 2024-11-22 PROCEDURE — 99285 EMERGENCY DEPT VISIT HI MDM: CPT | Mod: 25

## 2024-11-22 PROCEDURE — 73630 X-RAY EXAM OF FOOT: CPT

## 2024-11-22 PROCEDURE — 87040 BLOOD CULTURE FOR BACTERIA: CPT

## 2024-11-22 PROCEDURE — 80048 BASIC METABOLIC PNL TOTAL CA: CPT

## 2024-11-22 PROCEDURE — 71045 X-RAY EXAM CHEST 1 VIEW: CPT | Mod: 26

## 2024-11-22 PROCEDURE — 99232 SBSQ HOSP IP/OBS MODERATE 35: CPT

## 2024-11-22 PROCEDURE — 99233 SBSQ HOSP IP/OBS HIGH 50: CPT

## 2024-11-22 PROCEDURE — 93005 ELECTROCARDIOGRAM TRACING: CPT

## 2024-11-22 PROCEDURE — 85027 COMPLETE CBC AUTOMATED: CPT

## 2024-11-22 PROCEDURE — 99239 HOSP IP/OBS DSCHRG MGMT >30: CPT

## 2024-11-22 PROCEDURE — 84145 PROCALCITONIN (PCT): CPT

## 2024-11-22 PROCEDURE — 88305 TISSUE EXAM BY PATHOLOGIST: CPT

## 2024-11-22 PROCEDURE — 73700 CT LOWER EXTREMITY W/O DYE: CPT | Mod: MC

## 2024-11-22 PROCEDURE — 94640 AIRWAY INHALATION TREATMENT: CPT

## 2024-11-22 PROCEDURE — 85730 THROMBOPLASTIN TIME PARTIAL: CPT

## 2024-11-22 PROCEDURE — 71045 X-RAY EXAM CHEST 1 VIEW: CPT

## 2024-11-22 PROCEDURE — 36415 COLL VENOUS BLD VENIPUNCTURE: CPT

## 2024-11-22 PROCEDURE — 87075 CULTR BACTERIA EXCEPT BLOOD: CPT

## 2024-11-22 PROCEDURE — 96365 THER/PROPH/DIAG IV INF INIT: CPT

## 2024-11-22 PROCEDURE — 83036 HEMOGLOBIN GLYCOSYLATED A1C: CPT

## 2024-11-22 PROCEDURE — 86850 RBC ANTIBODY SCREEN: CPT

## 2024-11-22 PROCEDURE — 86140 C-REACTIVE PROTEIN: CPT

## 2024-11-22 PROCEDURE — 85652 RBC SED RATE AUTOMATED: CPT

## 2024-11-22 PROCEDURE — 83605 ASSAY OF LACTIC ACID: CPT

## 2024-11-22 PROCEDURE — 86901 BLOOD TYPING SEROLOGIC RH(D): CPT

## 2024-11-22 PROCEDURE — 87070 CULTURE OTHR SPECIMN AEROBIC: CPT

## 2024-11-22 RX ORDER — INSULIN ASPART 100 [IU]/ML
0 INJECTION, SOLUTION INTRAVENOUS; SUBCUTANEOUS
Refills: 0 | DISCHARGE

## 2024-11-22 RX ORDER — SODIUM CHLORIDE 9 MG/ML
10 INJECTION, SOLUTION INTRAMUSCULAR; INTRAVENOUS; SUBCUTANEOUS
Refills: 0 | Status: DISCONTINUED | OUTPATIENT
Start: 2024-11-22 | End: 2024-11-22

## 2024-11-22 RX ORDER — INSULIN GLARGINE 100 [IU]/ML
42 INJECTION, SOLUTION SUBCUTANEOUS AT BEDTIME
Refills: 0 | Status: DISCONTINUED | OUTPATIENT
Start: 2024-11-22 | End: 2024-11-22

## 2024-11-22 RX ORDER — PIPERACILLIN SODIUM AND TAZOBACTAM SODIUM 4; .5 G/20ML; G/20ML
3.38 INJECTION, POWDER, LYOPHILIZED, FOR SOLUTION INTRAVENOUS EVERY 8 HOURS
Refills: 0 | Status: DISCONTINUED | OUTPATIENT
Start: 2024-11-22 | End: 2024-11-22

## 2024-11-22 RX ORDER — ERTAPENEM 1 G/1
1000 INJECTION, POWDER, LYOPHILIZED, FOR SOLUTION INTRAMUSCULAR; INTRAVENOUS EVERY 24 HOURS
Refills: 0 | Status: DISCONTINUED | OUTPATIENT
Start: 2024-11-22 | End: 2024-11-22

## 2024-11-22 RX ORDER — INSULIN GLARGINE 100 [IU]/ML
42 INJECTION, SOLUTION SUBCUTANEOUS
Qty: 0 | Refills: 0 | DISCHARGE
Start: 2024-11-22

## 2024-11-22 RX ORDER — CHLORHEXIDINE GLUCONATE 1.2 MG/ML
1 RINSE ORAL
Refills: 0 | Status: DISCONTINUED | OUTPATIENT
Start: 2024-11-22 | End: 2024-11-22

## 2024-11-22 RX ORDER — ERTAPENEM 1 G/1
1 INJECTION, POWDER, LYOPHILIZED, FOR SOLUTION INTRAMUSCULAR; INTRAVENOUS
Qty: 1 | Refills: 0
Start: 2024-11-22

## 2024-11-22 RX ADMIN — ENOXAPARIN SODIUM 40 MILLIGRAM(S): 30 INJECTION SUBCUTANEOUS at 05:47

## 2024-11-22 RX ADMIN — METOPROLOL TARTRATE 50 MILLIGRAM(S): 100 TABLET, FILM COATED ORAL at 05:47

## 2024-11-22 RX ADMIN — ACETAMINOPHEN 500MG 650 MILLIGRAM(S): 500 TABLET, COATED ORAL at 06:23

## 2024-11-22 RX ADMIN — Medication 10: at 11:48

## 2024-11-22 RX ADMIN — PIPERACILLIN SODIUM AND TAZOBACTAM SODIUM 25 GRAM(S): 4; .5 INJECTION, POWDER, LYOPHILIZED, FOR SOLUTION INTRAVENOUS at 05:48

## 2024-11-22 RX ADMIN — ERTAPENEM 120 MILLIGRAM(S): 1 INJECTION, POWDER, LYOPHILIZED, FOR SOLUTION INTRAMUSCULAR; INTRAVENOUS at 11:46

## 2024-11-22 RX ADMIN — Medication 10: at 07:39

## 2024-11-22 RX ADMIN — Medication 6: at 16:03

## 2024-11-22 RX ADMIN — Medication 20 UNIT(S): at 07:40

## 2024-11-22 RX ADMIN — VALSARTAN 320 MILLIGRAM(S): 320 TABLET ORAL at 05:47

## 2024-11-22 RX ADMIN — Medication 20 UNIT(S): at 11:50

## 2024-11-22 RX ADMIN — Medication 24 UNIT(S): at 16:04

## 2024-11-22 NOTE — DISCHARGE NOTE PROVIDER - NSDCCPCAREPLAN_GEN_ALL_CORE_FT
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

## 2024-11-22 NOTE — PHARMACOTHERAPY INTERVENTION NOTE - COMMENTS
Patient is currently on vancomycin 1500mG Q12hr regimen and level on 11/20/2024 @ 4am resulted at 15.6 with stable renal function (Scr 0.87 > 0.96 > 0.76)  Vancomycin level of 15.6 correlated to an AUC of 444 on current regimen, goal AUC is 400-600, so patient may continue with current dose and interval.   Will continue follow patient to monitor vancomycin levels and trend renal function to assist in therapeutic monitoring while receiving vancomycin   
Surgical cultures growing strep constellatus and patient being treated with vancomycin and zosyn for diabetic foot infection   Spoke with Dr. Marks regarding culture results and zosyn is to be continued pending sensitivities, D/C vancomycin

## 2024-11-22 NOTE — CHART NOTE - NSCHARTNOTEFT_GEN_A_CORE
Called for persistently high BGL despite increased sliding scale and pre-meal insulin.   Patient asymptomatic, no changes in diet, VSS.   Additional 5 units admelog ordered, RN to recheck glucose in 1 hour.   Nightly Lantus was increased earlier to day by Endocrine.   RN to continue to monitor, to alert provider w/ any change in patient status.
Follow up with blood glucose finger stick following 5U admelog along with increase in ISS and premeal. Repeat blood glucose post 2 hours was 443. Ordered 10U admelog, 1L NS, BMP, acetone and repeat POC glucose for 21:30.  Continue to monitor. RN to call PA with any change in patient status. Will follow repeat blood glucose.
Letter of Medical Necessity     Manual Standard Wheelchair  Patient is unable to ambulate with a cane or a walker and requires a manual standard wheelchair due to neuropathy. Patient has sufficient upper body strength to self propel. The use of a manual wheelchair will greatly improve the patient's ability to participate in MRADLs, will use it regularly, has not expressed an unwillingness to use the manual wheelchair and has a caregiver willing to assist. Patient requires elevated leg rests due to recent right foot partial 4th and 5th ray resection.
Patient is s/p right foot partial 4th and 5th ray amputation with removal of all nonviable skin, soft tissue and bone.   Approx 5 cc purulent drainage was expressed from the area.   The incision site was left open for any remaining purulent drainage to leave the area and the patient will need to return to the operating room later this week or early next week for second procedure.   Patient can be WBAT to right heel.
November 22nd, 2024     [place] [address] [place] [address]      To Whom It May Concern,     Mr. Pilo Hood was admitted on November 17th, 2024, treated and discharged on November 22nd, 2024 from [facility] with restriction on driving or operate machinery. Please excuse him from work during this time until November 29th, 2024.     If there are any questions or concerns please call [phone]      Sincerely,    Ellie Bach PA-C  Medicine Department   [facility]
Patient is s/p right foot wound debridement with closure.  Excisional debridement took place down to the level of the subcutaneous tissue.  Incision was closed completely.   Patient to be non-weight bearing to [facility].   No cultures were sent during this procedure.
Pt. spiked oral Temp 101.5 upon arrival to the floor.   Pt. denies any SOB, CP, palpitations, diaphoresis, abd.p/N/V, dysuria    Vital Signs Last 24 Hrs  T(C): 38.6 (17 Nov 2024 13:48), Max: 38.6 (17 Nov 2024 13:48)  T(F): 101.5 (17 Nov 2024 13:48), Max: 101.5 (17 Nov 2024 13:48)  HR: 73 (17 Nov 2024 13:48) (73 - 109)  BP: 163/80 (17 Nov 2024 13:48) (125/75 - 163/80)  BP(mean): --  RR: 18 (17 Nov 2024 13:48) (18 - 20)  SpO2: 98% (17 Nov 2024 13:48) (95% - 98%)    Parameters below as of 17 Nov 2024 04:06  Patient On (Oxygen Delivery Method): room air    A/P: 54M with a history of hypertension, hyperlipidemia, diabetes, and asthma with neuropathy, who presented with a two week history of right toe discoloration and leg swelling with malodorous odor.  Sepsis / Cellulitis    Fever  Ofirmev IVPB  Cold compresses  IVF Bolus  additional blood work ordered: lactate, procal, PT/PTT/INR  blood cs drawn at the admission time (in am)  Patient advised by Medicien attending to discontinue insulin pump for now pending possible MRI or surgical intervention  c/w Zosyn  Podiatry on board  D/w Medicine attending agreed w/the plan

## 2024-11-22 NOTE — PHYSICAL THERAPY INITIAL EVALUATION ADULT - PERTINENT HX OF CURRENT PROBLEM, REHAB EVAL
Pt is a 53 y/o male who presented from home with right wound (+) OM. Pt had ray amputations and then went back to OR on 11/21 for wound closure.

## 2024-11-22 NOTE — PHYSICAL THERAPY INITIAL EVALUATION ADULT - LEVEL OF INDEPENDENCE: SIT/SUPINE, REHAB EVAL
Patient: Ragini Dozier    Procedure Summary     Date: 10/01/21 Room / Location: [facility] ENDOSCOPY 4 / [facility] ENDOSCOPY    Anesthesia Start: 0911 Anesthesia Stop: 0931    Procedure: ESOPHAGOGASTRODUODENOSCOPY WITH BIOPSY (N/A ) Diagnosis:       Esophageal dysphagia      Globus sensation      (Esophageal dysphagia [R13.10])      (Globus sensation [R09.89])    Surgeons: Hue Meyer MD Provider: Shalonda Gamez DO    Anesthesia Type: general ASA Status: 3          Anesthesia Type: general    Vitals  Vitals Value Taken Time   BP 92/66 10/01/21 0942   Temp 36.7 °C (98.1 °F) 10/01/21 0932   Pulse 68 10/01/21 0942   Resp 18 10/01/21 0942   SpO2 99 % 10/01/21 0942           Post Anesthesia Care and Evaluation    Patient location during evaluation: bedside  Patient participation: complete - patient participated  Level of consciousness: awake  Pain management: adequate  Airway patency: patent  Anesthetic complications: No anesthetic complications  PONV Status: none  Cardiovascular status: acceptable and stable  Respiratory status: acceptable  Hydration status: acceptable    Comments: An Anesthesiologist personally participated in the most demanding procedures (including induction and emergence if applicable) in the anesthesia plan, monitored the course of anesthesia administration at frequent intervals and remained physically present and available for immediate diagnosis and treatment of emergencies.         
independent

## 2024-11-22 NOTE — PROGRESS NOTE ADULT - SUBJECTIVE AND OBJECTIVE BOX
seen for to necrosis    feels ok  pain ok  FS running high  ros negative     MEDICATIONS  (STANDING):  dextrose 5%. 1000 milliLiter(s) (50 mL/Hr) IV Continuous <Continuous>  dextrose 5%. 1000 milliLiter(s) (100 mL/Hr) IV Continuous <Continuous>  dextrose 5%. 1000 milliLiter(s) (50 mL/Hr) IV Continuous <Continuous>  dextrose 5%. 1000 milliLiter(s) (100 mL/Hr) IV Continuous <Continuous>  dextrose 50% Injectable 25 Gram(s) IV Push once  dextrose 50% Injectable 25 Gram(s) IV Push once  dextrose 50% Injectable 12.5 Gram(s) IV Push once  dextrose 50% Injectable 25 Gram(s) IV Push once  dextrose 50% Injectable 25 Gram(s) IV Push once  dextrose 50% Injectable 12.5 Gram(s) IV Push once  enoxaparin Injectable 40 milliGRAM(s) SubCutaneous every 24 hours  ertapenem  IVPB 1000 milliGRAM(s) IV Intermittent every 24 hours  glucagon  Injectable 1 milliGRAM(s) IntraMuscular once  glucagon  Injectable 1 milliGRAM(s) IntraMuscular once  influenza   Vaccine 0.5 milliLiter(s) IntraMuscular once  insulin glargine Injectable (LANTUS) 42 Unit(s) SubCutaneous at bedtime  insulin lispro (ADMELOG) corrective regimen sliding scale   SubCutaneous three times a day before meals  insulin lispro Injectable (ADMELOG) 20 Unit(s) SubCutaneous three times a day before meals  metoprolol succinate ER 50 milliGRAM(s) Oral daily  rosuvastatin 40 milliGRAM(s) Oral at bedtime  valsartan 320 milliGRAM(s) Oral daily    MEDICATIONS  (PRN):  acetaminophen     Tablet .. 650 milliGRAM(s) Oral every 6 hours PRN Temp greater or equal to 38C (100.4F), Mild Pain (1 - 3)  albuterol    0.083% 2.5 milliGRAM(s) Nebulizer every 6 hours PRN Shortness of Breath and/or Wheezing  albuterol    90 MICROgram(s) HFA Inhaler 1 Puff(s) Inhalation every 4 hours PRN Shortness of Breath and/or Wheezing  dextrose Oral Gel 15 Gram(s) Oral once PRN Blood Glucose LESS THAN 70 milliGRAM(s)/deciliter  dextrose Oral Gel 15 Gram(s) Oral once PRN Blood Glucose LESS THAN 70 milliGRAM(s)/deciliter      Allergies    No Known Allergies         Vital Signs Last 24 Hrs  T(C): 36.7 (22 Nov 2024 11:44), Max: 37.4 (21 Nov 2024 15:20)  T(F): 98.1 (22 Nov 2024 11:44), Max: 99.4 (21 Nov 2024 15:20)  HR: 80 (22 Nov 2024 11:44) (75 - 86)  BP: 159/91 (22 Nov 2024 11:44) (143/77 - 171/90)  BP(mean): --  RR: 18 (22 Nov 2024 11:44) (10 - 18)  SpO2: 99% (22 Nov 2024 11:44) (97% - 100%)    Parameters below as of 22 Nov 2024 11:44  Patient On (Oxygen Delivery Method): room air        PHYSICAL EXAM:    GENERAL: NAD  CHEST/LUNG: Clear to ausculation bilaterally  HEART: Regular rate and rhythm; S1 S2  ABDOMEN: Soft, ; Bowel sounds present  EXTREMITIES:  right foot bandaged. some swelling of all ext more pronounced in LUE swelling   NERVOUS SYSTEM:  Alert & Oriented X3, Motor Strength 5/5 B/L upper and lower extremities  PSYCH: normal mood, appropriate response.    LABS:                        8.2    9.47  )-----------( 389      ( 22 Nov 2024 04:08 )             24.8     11-22    137  |  100  |  10.0  ----------------------------<  335[H]  4.2   |  26.0  |  0.95    Ca    8.7      22 Nov 2024 04:08  Phos  3.3     11-22  Mg     1.7     11-22        Urinalysis Basic - ( 22 Nov 2024 04:08 )    Color: x / Appearance: x / SG: x / pH: x  Gluc: 335 mg/dL / Ketone: x  / Bili: x / Urobili: x   Blood: x / Protein: x / Nitrite: x   Leuk Esterase: x / RBC: x / WBC x   Sq Epi: x / Non Sq Epi: x / Bacteria: x        CAPILLARY BLOOD GLUCOSE      POCT Blood Glucose.: 382 mg/dL (22 Nov 2024 11:47)  POCT Blood Glucose.: 351 mg/dL (22 Nov 2024 07:36)  POCT Blood Glucose.: 243 mg/dL (21 Nov 2024 23:11)  POCT Blood Glucose.: 296 mg/dL (21 Nov 2024 19:06)  POCT Blood Glucose.: 263 mg/dL (21 Nov 2024 18:46)  POCT Blood Glucose.: 229 mg/dL (21 Nov 2024 15:47)        RADIOLOGY & ADDITIONAL TESTS:

## 2024-11-22 NOTE — DISCHARGE NOTE PROVIDER - NSDCMRMEDTOKEN_GEN_ALL_CORE_FT
Albuterol (Eqv-Proventil HFA) 90 mcg/inh inhalation aerosol: 2 inhaled 4 times a day as needed for  shortness of breath and/or wheezing  ertapenem 1 g injection: 1 gram(s) intravenously once a day weekly cbc cmp sed rate crp  hydroCHLOROthiazide 25 mg oral tablet: 1 tab(s) orally once a day  insulin glargine 100 units/mL subcutaneous solution: 42 unit(s) subcutaneous once a day (at bedtime)  insulin lispro 100 units/mL injectable solution: 24 unit(s) injectable 3 times a day (with meals)  MetFORMIN (Eqv-Glucophage XR) 500 mg oral tablet, extended release: 1 tab(s) orally 3 times a day  rosuvastatin 40 mg oral tablet: 1 tab(s) orally once a day  semaglutide 2 mg/1.5 mL (1 mg dose) subcutaneous solution: 2 milligram(s) subcutaneous once a week  Toprol-XL 50 mg oral tablet, extended release: 1 tab(s) orally once a day  valsartan 320 mg oral tablet: 1 tab(s) orally once a day

## 2024-11-22 NOTE — PROGRESS NOTE ADULT - SUBJECTIVE AND OBJECTIVE BOX
INFECTIOUS DISEASES AND INTERNAL MEDICINE at [place]  =======================================================  Chris Voss MD  Diplomates [employer] Board of Internal Medicine and Infectious Diseases  Telephone [phone]  Fax            [phone]  =======================================================    JANIS LEON [id number]    Follow up: right foot diabetic foot osteo    Allergies:  No Known Allergies      Medications:  acetaminophen     Tablet .. 650 milliGRAM(s) Oral every 6 hours PRN  albuterol    0.083% 2.5 milliGRAM(s) Nebulizer every 6 hours PRN  albuterol    90 MICROgram(s) HFA Inhaler 1 Puff(s) Inhalation every 4 hours PRN  dextrose 5%. 1000 milliLiter(s) IV Continuous <Continuous>  dextrose 5%. 1000 milliLiter(s) IV Continuous <Continuous>  dextrose 5%. 1000 milliLiter(s) IV Continuous <Continuous>  dextrose 5%. 1000 milliLiter(s) IV Continuous <Continuous>  dextrose 50% Injectable 25 Gram(s) IV Push once  dextrose 50% Injectable 12.5 Gram(s) IV Push once  dextrose 50% Injectable 25 Gram(s) IV Push once  dextrose 50% Injectable 25 Gram(s) IV Push once  dextrose 50% Injectable 12.5 Gram(s) IV Push once  dextrose 50% Injectable 25 Gram(s) IV Push once  dextrose Oral Gel 15 Gram(s) Oral once PRN  dextrose Oral Gel 15 Gram(s) Oral once PRN  enoxaparin Injectable 40 milliGRAM(s) SubCutaneous every 24 hours  glucagon  Injectable 1 milliGRAM(s) IntraMuscular once  glucagon  Injectable 1 milliGRAM(s) IntraMuscular once  influenza   Vaccine 0.5 milliLiter(s) IntraMuscular once  insulin glargine Injectable (LANTUS) 30 Unit(s) SubCutaneous at bedtime  insulin lispro (ADMELOG) corrective regimen sliding scale   SubCutaneous three times a day before meals  insulin lispro Injectable (ADMELOG) 5 Unit(s) SubCutaneous three times a day before meals  metoprolol succinate ER 50 milliGRAM(s) Oral daily  piperacillin/tazobactam IVPB.. 3.375 Gram(s) IV Intermittent every 8 hours  rosuvastatin 40 milliGRAM(s) Oral at bedtime  sodium chloride 0.9%. 1000 milliLiter(s) IV Continuous <Continuous>  valsartan 320 milliGRAM(s) Oral daily  vancomycin  IVPB 1500 milliGRAM(s) IV Intermittent every 12 hours    SOCIAL       FAMILY   FAMILY HISTORY:    REVIEW OF SYSTEMS:  CONSTITUTIONAL:  No Fever or chills  HEENT:   No diplopia or blurred vision.  No earache, sore throat or runny nose.  CARDIOVASCULAR:  No pressure, squeezing, strangling, tightness, heaviness or aching about the chest, neck, axilla or epigastrium.  RESPIRATORY:  No cough, shortness of breath, PND or orthopnea.  GASTROINTESTINAL:  No nausea, vomiting or diarrhea.  GENITOURINARY:  No dysuria, frequency or urgency. No Blood in urine  MUSCULOSKELETAL:   aS   Per HPI  NEUROLOGIC:  No paresthesias, fasciculations, seizures or weakness.  PSYCHIATRIC:  No disorder of thought or mood.  ENDOCRINE:  No heat or cold intolerance, polyuria or polydipsia.  HEMATOLOGICAL:  No easy bruising or bleeding.            Physical Exam:     Vital Signs Last 24 Hrs  T(C): 36.6 (22 Nov 2024 05:42), Max: 37.4 (21 Nov 2024 15:20)  T(F): 97.8 (22 Nov 2024 05:42), Max: 99.4 (21 Nov 2024 15:20)  HR: 78 (22 Nov 2024 05:42) (71 - 86)  BP: 150/77 (22 Nov 2024 05:42) (143/77 - 171/90)  BP(mean): --  RR: 18 (22 Nov 2024 05:42) (10 - 18)  SpO2: 98% (22 Nov 2024 05:42) (97% - 100%)    Parameters below as of 22 Nov 2024 00:52  Patient On (Oxygen Delivery Method): room air                    GEN: NAD,   HEENT: normocephalic and atraumatic. EOMI. CARTER.    NECK: Supple. No carotid bruits.  No lymphadenopathy or thyromegaly.  LUNGS: Clear to auscultation.  HEART: Regular rate and rhythm without murmur.  ABDOMEN: Soft, nontender, and nondistended.  Positive bowel sounds.    : No CVA tenderness  EXTREMITIES: RIGHT FOOT   S/P 4TH ,5TH AMPUITATION  DECREASED EDEMA SURGICAL SITE APPEARS CLEAN NO ODOR   MSK: no joint swelling  NEUROLOGIC: Cranial nerves II through XII are grossly intact.  PSYCHIATRIC: Appropriate affect .  SKIN: No ulceration or induration present.        Labs:  Vitals:   =======================================================  Current Antibiotics:    invanz    Other medications:  dextrose 5%. 1000 milliLiter(s) IV Continuous <Continuous>  dextrose 5%. 1000 milliLiter(s) IV Continuous <Continuous>  dextrose 5%. 1000 milliLiter(s) IV Continuous <Continuous>  dextrose 5%. 1000 milliLiter(s) IV Continuous <Continuous>  dextrose 50% Injectable 25 Gram(s) IV Push once  dextrose 50% Injectable 12.5 Gram(s) IV Push once  dextrose 50% Injectable 25 Gram(s) IV Push once  dextrose 50% Injectable 25 Gram(s) IV Push once  dextrose 50% Injectable 12.5 Gram(s) IV Push once  dextrose 50% Injectable 25 Gram(s) IV Push once  enoxaparin Injectable 40 milliGRAM(s) SubCutaneous every 24 hours  glucagon  Injectable 1 milliGRAM(s) IntraMuscular once  glucagon  Injectable 1 milliGRAM(s) IntraMuscular once  influenza   Vaccine 0.5 milliLiter(s) IntraMuscular once  insulin glargine Injectable (LANTUS) 30 Unit(s) SubCutaneous at bedtime  insulin lispro (ADMELOG) corrective regimen sliding scale   SubCutaneous three times a day before meals  insulin lispro Injectable (ADMELOG) 5 Unit(s) SubCutaneous three times a day before meals  metoprolol succinate ER 50 milliGRAM(s) Oral daily  rosuvastatin 40 milliGRAM(s) Oral at bedtime  sodium chloride 0.9%. 1000 milliLiter(s) IV Continuous <Continuous>  valsartan 320 milliGRAM(s) Oral daily      =======================================================  Labs:                                         8.2    9.47  )-----------( 389      ( 22 Nov 2024 04:08 )             24.8   11-22    137  |  100  |  10.0  ----------------------------<  335[H]  4.2   |  26.0  |  0.95    Ca    8.7      22 Nov 2024 04:08  Phos  3.3     11-22  Mg     1.7     11-22                Culture - Tissue with Gram Stain (collected 11-18-24 @ 21:20)  Source: Tissue  Gram Stain (11-19-24 @ 06:14):    No polymorphonuclear cells seen per low power field    No organisms seen per oil power field    Culture - Tissue with Gram Stain (collected 11-18-24 @ 21:19)  Source: Tissue  Gram Stain (11-19-24 @ 06:14):    No polymorphonuclear cells seen per low power field    No organisms seen per oil power field    Culture - Wound Aerobic/Anaerobic (collected 11-17-24 @ 13:05)  Source: Skin/Wound  Preliminary Report (11-18-24 @ 22:05):    Moderate Streptococcus constellatus    "Susceptibilities not performed"    Culture - Blood (collected 11-17-24 @ 05:30)  Source: .Blood BLOOD  Preliminary Report (11-19-24 @ 09:01):    No growth at 48 Hours      Creatinine: 0.87 mg/dL (11-19-24 @ 03:58)  Creatinine: 0.93 mg/dL (11-18-24 @ 03:56)  Creatinine: 1.36 mg/dL (11-17-24 @ 05:30)    Procalcitonin: 0.28 ng/mL (11-17-24 @ 15:00)        C-Reactive Protein: 239 mg/L (11-19-24 @ 03:58)  C-Reactive Protein: 401 mg/L (11-17-24 @ 05:30)    WBC Count: 8.22 K/uL (11-19-24 @ 03:58)  WBC Count: 9.42 K/uL (11-18-24 @ 03:56)  WBC Count: 13.22 K/uL (11-17-24 @ 05:30)        Alkaline Phosphatase: 137 U/L (11-17-24 @ 05:30)  Alanine Aminotransferase (ALT/SGPT): 28 U/L (11-17-24 @ 05:30)  Aspartate Aminotransferase (AST/SGOT): 26 U/L (11-17-24 @ 05:30)  Bilirubin Total: 0.4 mg/dL (11-17-24 @ 05:30)

## 2024-11-22 NOTE — DISCHARGE NOTE PROVIDER - HOSPITAL COURSE
54yoM with PMHx of hypertension, hyperlipidemia, diabetes, and asthma with neuropathy, who presented with a two week history of right toe discoloration and leg swelling with malodorous odor.  treated for #Sepsis / Cellulitis, # toe necrosis with IV antibiotics per ID with plan for iv invanz via picc line until 12/30.  podiatry consulted, underwent partial 4th/5th ray resection. non weight bearing off RLE  endocrine managed insulin regimen and ok with dc home with elevated FS . krishna resolved. anemia stable

## 2024-11-22 NOTE — PROCEDURE NOTE - ADDITIONAL PROCEDURE DETAILS
4FR 55CM 32CIRC ADINCON POWER PICC LINE good flash ns flush left basilic vein 4FR 55CM 32CIRC fitkit POWER PICC LINE good flash ns flush left basilic vein      s/p Chest X-Ray CATHETER WITHDRAWN 3CM FOR OPTIMAL PLACEMENT OF DISTAL TIP    THERE IS NOW 3CM OF INSERTABLE CATHETER EXTERNAL TO PATIENT 4FR 55CM 32CIRC Netcipia POWER PICC LINE good flash ns flush left basilic vein      s/p Chest X-Ray CATHETER WITHDRAWN 3CM FOR OPTIMAL PLACEMENT OF DISTAL TIP    THERE IS NOW 3CM OF INSERTABLE CATHETER EXTERNAL TO PATIENT    MAY USE PICC FOR INTRAVENOUS THERAPY

## 2024-11-22 NOTE — PROCEDURE NOTE - ESTIMATED BLOOD LOSS
 Wears contact lenses     LEFT EYE ONLY       Past Surgical History:   Procedure Laterality Date    CARDIAC SURGERY      2 stents  placed 2015    CORONARY ANGIOPLASTY WITH STENT PLACEMENT      HYSTERECTOMY  30 years     JOINT REPLACEMENT Left 03/2016    bilat. knees    KNEE ARTHROSCOPY Left     TOTAL KNEE ARTHROPLASTY Right 07/21/2016       Family History   Problem Relation Age of Onset    Stroke Mother     Heart Disease Mother     Heart Disease Father     Other Sister      migraines    Heart Disease Brother        Social History     Social History    Marital status: Single     Spouse name: N/A    Number of children: N/A    Years of education: N/A     Social History Main Topics    Smoking status: Never Smoker    Smokeless tobacco: Never Used    Alcohol use No    Drug use: No    Sexual activity: Not Asked     Other Topics Concern    None     Social History Narrative    None       Current Outpatient Prescriptions   Medication Sig Dispense Refill    losartan (COZAAR) 50 MG tablet take 1 tablet by mouth once daily 30 tablet 3    oxybutynin (DITROPAN) 5 MG tablet TAKE 1 TABLET EVERY DAY 90 tablet 5    amLODIPine (NORVASC) 5 MG tablet Take 1 tablet by mouth nightly 30 tablet 3    clopidogrel (PLAVIX) 75 MG tablet Take 1 tablet by mouth daily 30 tablet 3    amitriptyline (ELAVIL) 50 MG tablet Take 1 tablet by mouth nightly 30 tablet 3    atorvastatin (LIPITOR) 80 MG tablet Take 1 tablet by mouth nightly 30 tablet 3    glucose blood VI test strips (ONETOUCH VERIO) strip 1 each by In Vitro route daily Daily. She has a OneTouch Verio machine. Needs compatible test strips.  50 each 3    ibuprofen (ADVIL;MOTRIN) 400 MG tablet Take 1 tablet by mouth every 8 hours as needed for Pain or Fever 90 tablet 0    acetaminophen (TYLENOL) 325 MG tablet Take 1 tablet by mouth every 6 hours as needed for Pain 90 tablet 0    Respiratory Therapy Supplies AMANDEEP Lower nasal CPAP pressure 14 cm H20 1 Device 0    metFORMIN (GLUCOPHAGE) 500 MG tablet Take 1,000 mg by mouth 2 times daily (with meals)      docusate sodium (COLACE) 100 MG capsule Take 100 mg by mouth daily as needed for Constipation      magnesium hydroxide (MILK OF MAGNESIA) 400 MG/5ML suspension Take 5 mLs by mouth daily as needed for Constipation      furosemide (LASIX) 20 MG tablet Take 1 tablet by mouth daily 60 tablet 3    aspirin 81 MG tablet Take 1 tablet by mouth daily 30 tablet 3     No current facility-administered medications for this visit. Allergies   Allergen Reactions    Lisinopril Itching     Inflammation, burning, itching    Pcn [Penicillins] Hives       Review of Systems   Constitutional: Positive for fatigue. HENT: Negative. Eyes: Negative. Respiratory: Negative. Cardiovascular: Negative. Gastrointestinal: Positive for constipation. Endocrine: Negative. Genitourinary: Negative. Musculoskeletal: Negative. Skin: Negative. Allergic/Immunologic: Negative. Neurological: Negative. Hematological: Negative. Psychiatric/Behavioral: Negative. Objective:   Physical Exam    Vitals:    03/13/18 1502   BP: (!) 144/98   Pulse: 88     weight: 286 lb 9.6 oz (130 kg)    Neurological Examination  Constitutional .General exam well groomed   Ears /Nose/Throat: external ear . Normal exam  Neck and thyroid . Normal size  Cardiovascular: Auscultation of heart with regular rate and rhythm   Musculoskeletal. Muscle bulk and tone normal                                                           Muscle strength 5/5 strength throughout                                                                                 No dysmetria or dysdiadokinesis  No tremor   Normal fine motor  Orientation Alert and oriented x 3   Language process and speech normal . No aphasia   Cranial nerve 2 normal acuety and visual fields  Cranial nerve 3, 4 and 6 . Extraocular muscles are intact . Pupils are equal and reactive   Cranial nerve 5 . Minimal

## 2024-11-22 NOTE — PHYSICAL THERAPY INITIAL EVALUATION ADULT - ADDITIONAL COMMENTS
Pt will stay with sister in an apartment with a ramp and an elevator. Pt is independent at baseline and has no DME.

## 2024-11-22 NOTE — PROGRESS NOTE ADULT - SUBJECTIVE AND OBJECTIVE BOX
Interval: Patient was seen resting in bed. Patient s/p wound debridement with full closure. Patient states his pain is minimal.     HPI:  A 54-year-old male with a history of neuropathy and recurrent foot ulcers presents with right foot swelling and fifth toe discoloration. He developed a blister on the fifth toe on November 8th, which subsequently ruptured with fluid drainage. Despite regular dressing changes, the discoloration and ankle swelling persisted. He denies pain, fever, or chills but reports a malodorous odor from the foot. Prior evaluations by his PCP and podiatrist resulted in no intervention. He denied any recent injury to the toe and denied any similar findings on the other toes.    Medications acetaminophen     Tablet .. 650 milliGRAM(s) Oral every 6 hours PRN  albuterol    0.083% 2.5 milliGRAM(s) Nebulizer every 6 hours PRN  albuterol    90 MICROgram(s) HFA Inhaler 1 Puff(s) Inhalation every 4 hours PRN  dextrose 5%. 1000 milliLiter(s) IV Continuous <Continuous>  dextrose 5%. 1000 milliLiter(s) IV Continuous <Continuous>  dextrose 5%. 1000 milliLiter(s) IV Continuous <Continuous>  dextrose 5%. 1000 milliLiter(s) IV Continuous <Continuous>  dextrose 50% Injectable 25 Gram(s) IV Push once  dextrose 50% Injectable 12.5 Gram(s) IV Push once  dextrose 50% Injectable 25 Gram(s) IV Push once  dextrose 50% Injectable 12.5 Gram(s) IV Push once  dextrose 50% Injectable 25 Gram(s) IV Push once  dextrose 50% Injectable 25 Gram(s) IV Push once  dextrose Oral Gel 15 Gram(s) Oral once PRN  dextrose Oral Gel 15 Gram(s) Oral once PRN  enoxaparin Injectable 40 milliGRAM(s) SubCutaneous every 24 hours  glucagon  Injectable 1 milliGRAM(s) IntraMuscular once  glucagon  Injectable 1 milliGRAM(s) IntraMuscular once  influenza   Vaccine 0.5 milliLiter(s) IntraMuscular once  insulin glargine Injectable (LANTUS) 42 Unit(s) SubCutaneous at bedtime  insulin lispro (ADMELOG) corrective regimen sliding scale   SubCutaneous three times a day before meals  insulin lispro Injectable (ADMELOG) 20 Unit(s) SubCutaneous three times a day before meals  metoprolol succinate ER 50 milliGRAM(s) Oral daily  piperacillin/tazobactam IVPB.. 3.375 Gram(s) IV Intermittent every 8 hours  rosuvastatin 40 milliGRAM(s) Oral at bedtime  sodium chloride 0.9%. 1000 milliLiter(s) IV Continuous <Continuous>  valsartan 320 milliGRAM(s) Oral daily    FH,   PMH   PSH    Labs                          8.2    9.47  )-----------( 389      ( 22 Nov 2024 04:08 )             24.8      11-22    137  |  100  |  10.0  ----------------------------<  335[H]  4.2   |  26.0  |  0.95    Ca    8.7      22 Nov 2024 04:08  Phos  3.3     11-22  Mg     1.7     11-22       Vital Signs Last 24 Hrs  T(C): 36.6 (22 Nov 2024 05:42), Max: 37.4 (21 Nov 2024 15:20)  T(F): 97.8 (22 Nov 2024 05:42), Max: 99.4 (21 Nov 2024 15:20)  HR: 78 (22 Nov 2024 05:42) (71 - 86)  BP: 150/77 (22 Nov 2024 05:42) (143/77 - 171/90)  BP(mean): --  RR: 18 (22 Nov 2024 05:42) (10 - 18)  SpO2: 98% (22 Nov 2024 05:42) (97% - 100%)    Parameters below as of 22 Nov 2024 00:52  Patient On (Oxygen Delivery Method): room air      Sedimentation Rate, Erythrocyte: 114 mm/hr (11-19-24 @ 03:58)  Sedimentation Rate, Erythrocyte: 98 mm/hr (11-17-24 @ 05:30)         C-Reactive Protein: 239 mg/L (11-19-24 @ 03:58)  C-Reactive Protein: 401 mg/L (11-17-24 @ 05:30)   WBC Count: 9.47 K/uL (11-22-24 @ 04:08)    PHYSICAL EXAM  LE Focused:    Vasc:  DP & PT non-palpable b/l. CFT<3 seconds to all 10 digits b/l. TG warm to warm b/l  Derm: Right foot 5th digit wound. Necrotic and fluctuant. Wound probes to bone. Soft tissue crepitus present, increased malodor, 5cc of seropurulent drainage noted upon expression of the wound. Erythema noted spanning from R 5th digit to ankle.   Neuro: Gross and light touch sensation diminshed b/l  MSK: No pain on palpation to foot. Able to move remaining digits.     IMAGING:< from: CT Lower Extremity No Cont, Right (11.17.24 @ 23:42) >  IMPRESSION:  1.  Osseous destruction is again seen around the 5th proximal   interphalangeal joint with intraosseous gas consistent with emphysematous   osteomyelitis.  2.  Surrounding soft tissue gas is present with soft tissue wound   extending along the dorsum of the 4th webspace to the level of the 4th   and 5th metatarsal necks with presumed packing material versus complex   gas.  3.  Changes of the ankle and midfoot suggest early Charcot arthropathy.  4.  Right inguinal/groin adenopathy.    < end of copied text >      CULTURES: pending            Interval: Patient was seen resting in bed. Patient s/p wound debridement with full closure. Patient states his pain is minimal.     HPI:  A 54-year-old male with a history of neuropathy and recurrent foot ulcers presents with right foot swelling and fifth toe discoloration. He developed a blister on the fifth toe on November 8th, which subsequently ruptured with fluid drainage. Despite regular dressing changes, the discoloration and ankle swelling persisted. He denies pain, fever, or chills but reports a malodorous odor from the foot. Prior evaluations by his PCP and podiatrist resulted in no intervention. He denied any recent injury to the toe and denied any similar findings on the other toes.    Medications acetaminophen     Tablet .. 650 milliGRAM(s) Oral every 6 hours PRN  albuterol    0.083% 2.5 milliGRAM(s) Nebulizer every 6 hours PRN  albuterol    90 MICROgram(s) HFA Inhaler 1 Puff(s) Inhalation every 4 hours PRN  dextrose 5%. 1000 milliLiter(s) IV Continuous <Continuous>  dextrose 5%. 1000 milliLiter(s) IV Continuous <Continuous>  dextrose 5%. 1000 milliLiter(s) IV Continuous <Continuous>  dextrose 5%. 1000 milliLiter(s) IV Continuous <Continuous>  dextrose 50% Injectable 25 Gram(s) IV Push once  dextrose 50% Injectable 12.5 Gram(s) IV Push once  dextrose 50% Injectable 25 Gram(s) IV Push once  dextrose 50% Injectable 12.5 Gram(s) IV Push once  dextrose 50% Injectable 25 Gram(s) IV Push once  dextrose 50% Injectable 25 Gram(s) IV Push once  dextrose Oral Gel 15 Gram(s) Oral once PRN  dextrose Oral Gel 15 Gram(s) Oral once PRN  enoxaparin Injectable 40 milliGRAM(s) SubCutaneous every 24 hours  glucagon  Injectable 1 milliGRAM(s) IntraMuscular once  glucagon  Injectable 1 milliGRAM(s) IntraMuscular once  influenza   Vaccine 0.5 milliLiter(s) IntraMuscular once  insulin glargine Injectable (LANTUS) 42 Unit(s) SubCutaneous at bedtime  insulin lispro (ADMELOG) corrective regimen sliding scale   SubCutaneous three times a day before meals  insulin lispro Injectable (ADMELOG) 20 Unit(s) SubCutaneous three times a day before meals  metoprolol succinate ER 50 milliGRAM(s) Oral daily  piperacillin/tazobactam IVPB.. 3.375 Gram(s) IV Intermittent every 8 hours  rosuvastatin 40 milliGRAM(s) Oral at bedtime  sodium chloride 0.9%. 1000 milliLiter(s) IV Continuous <Continuous>  valsartan 320 milliGRAM(s) Oral daily    FH,   PMH   PSH    Labs                          8.2    9.47  )-----------( 389      ( 22 Nov 2024 04:08 )             24.8      11-22    137  |  100  |  10.0  ----------------------------<  335[H]  4.2   |  26.0  |  0.95    Ca    8.7      22 Nov 2024 04:08  Phos  3.3     11-22  Mg     1.7     11-22       Vital Signs Last 24 Hrs  T(C): 36.6 (22 Nov 2024 05:42), Max: 37.4 (21 Nov 2024 15:20)  T(F): 97.8 (22 Nov 2024 05:42), Max: 99.4 (21 Nov 2024 15:20)  HR: 78 (22 Nov 2024 05:42) (71 - 86)  BP: 150/77 (22 Nov 2024 05:42) (143/77 - 171/90)  BP(mean): --  RR: 18 (22 Nov 2024 05:42) (10 - 18)  SpO2: 98% (22 Nov 2024 05:42) (97% - 100%)    Parameters below as of 22 Nov 2024 00:52  Patient On (Oxygen Delivery Method): room air      Sedimentation Rate, Erythrocyte: 114 mm/hr (11-19-24 @ 03:58)  Sedimentation Rate, Erythrocyte: 98 mm/hr (11-17-24 @ 05:30)         C-Reactive Protein: 239 mg/L (11-19-24 @ 03:58)  C-Reactive Protein: 401 mg/L (11-17-24 @ 05:30)   WBC Count: 9.47 K/uL (11-22-24 @ 04:08)    PHYSICAL EXAM  LE Focused:    Vasc:  DP & PT non-palpable b/l. CFT<3 seconds to all 10 digits b/l. TG warm to warm b/l  Derm: Right foot 5th digit wound. Necrotic and fluctuant. Wound probes to bone. Soft tissue crepitus present, increased malodor, 5cc of seropurulent drainage noted upon expression of the wound. Erythema noted spanning from R 5th digit to ankle.   Post op: Incision is well coapted with no signs of dehiscence. Sutures and staples all intact. No drainage noted. Mild maceration noted on central incision. Plantar flap noted to be warm to touch with good CFT within normal limits.  Neuro: Gross and light touch sensation diminshed b/l  MSK: No pain on palpation to foot. Able to move remaining digits.     IMAGING:< from: CT Lower Extremity No Cont, Right (11.17.24 @ 23:42) >  IMPRESSION:  1.  Osseous destruction is again seen around the 5th proximal   interphalangeal joint with intraosseous gas consistent with emphysematous   osteomyelitis.  2.  Surrounding soft tissue gas is present with soft tissue wound   extending along the dorsum of the 4th webspace to the level of the 4th   and 5th metatarsal necks with presumed packing material versus complex   gas.  3.  Changes of the ankle and midfoot suggest early Charcot arthropathy.  4.  Right inguinal/groin adenopathy.    < end of copied text >      CULTURES: pending

## 2024-11-22 NOTE — PROGRESS NOTE ADULT - SUBJECTIVE AND OBJECTIVE BOX
INTERVAL EVENTS:  Follow up diabetes management. Glucoses above goal. Insulin doses were adjusted yesterday but he did not receive them due to being in the OR.     MEDICATIONS  (STANDING):  dextrose 5%. 1000 milliLiter(s) (50 mL/Hr) IV Continuous <Continuous>  dextrose 5%. 1000 milliLiter(s) (100 mL/Hr) IV Continuous <Continuous>  dextrose 5%. 1000 milliLiter(s) (100 mL/Hr) IV Continuous <Continuous>  dextrose 5%. 1000 milliLiter(s) (50 mL/Hr) IV Continuous <Continuous>  dextrose 50% Injectable 25 Gram(s) IV Push once  dextrose 50% Injectable 12.5 Gram(s) IV Push once  dextrose 50% Injectable 25 Gram(s) IV Push once  dextrose 50% Injectable 12.5 Gram(s) IV Push once  dextrose 50% Injectable 25 Gram(s) IV Push once  dextrose 50% Injectable 25 Gram(s) IV Push once  enoxaparin Injectable 40 milliGRAM(s) SubCutaneous every 24 hours  ertapenem  IVPB 1000 milliGRAM(s) IV Intermittent every 24 hours  glucagon  Injectable 1 milliGRAM(s) IntraMuscular once  glucagon  Injectable 1 milliGRAM(s) IntraMuscular once  influenza   Vaccine 0.5 milliLiter(s) IntraMuscular once  insulin glargine Injectable (LANTUS) 42 Unit(s) SubCutaneous at bedtime  insulin lispro (ADMELOG) corrective regimen sliding scale   SubCutaneous three times a day before meals  insulin lispro Injectable (ADMELOG) 20 Unit(s) SubCutaneous three times a day before meals  metoprolol succinate ER 50 milliGRAM(s) Oral daily  rosuvastatin 40 milliGRAM(s) Oral at bedtime  sodium chloride 0.9%. 1000 milliLiter(s) (100 mL/Hr) IV Continuous <Continuous>  valsartan 320 milliGRAM(s) Oral daily    MEDICATIONS  (PRN):  acetaminophen     Tablet .. 650 milliGRAM(s) Oral every 6 hours PRN Temp greater or equal to 38C (100.4F), Mild Pain (1 - 3)  albuterol    0.083% 2.5 milliGRAM(s) Nebulizer every 6 hours PRN Shortness of Breath and/or Wheezing  albuterol    90 MICROgram(s) HFA Inhaler 1 Puff(s) Inhalation every 4 hours PRN Shortness of Breath and/or Wheezing  dextrose Oral Gel 15 Gram(s) Oral once PRN Blood Glucose LESS THAN 70 milliGRAM(s)/deciliter  dextrose Oral Gel 15 Gram(s) Oral once PRN Blood Glucose LESS THAN 70 milliGRAM(s)/deciliter    Allergies  No Known Allergies    Vital Signs Last 24 Hrs  T(C): 36.6 (22 Nov 2024 05:42), Max: 37.4 (21 Nov 2024 15:20)  T(F): 97.8 (22 Nov 2024 05:42), Max: 99.4 (21 Nov 2024 15:20)  HR: 78 (22 Nov 2024 05:42) (75 - 86)  BP: 150/77 (22 Nov 2024 05:42) (143/77 - 171/90)  BP(mean): --  RR: 18 (22 Nov 2024 05:42) (10 - 18)  SpO2: 98% (22 Nov 2024 05:42) (97% - 100%)    Parameters below as of 22 Nov 2024 00:52  Patient On (Oxygen Delivery Method): room air    PHYSICAL EXAM:  General: No apparent distress  Respiratory: Lungs clear bilaterally  Cardiac: +S1, S2, no m/r/g  GI: +BS, soft, non tender, non distended  Extremities: R foot in ace wrap dressing  Neuro: A+O X3    LABS:                        8.2    9.47  )-----------( 389      ( 22 Nov 2024 04:08 )             24.8     11-22    137  |  100  |  10.0  ----------------------------<  335[H]  4.2   |  26.0  |  0.95    Ca    8.7      22 Nov 2024 04:08  Phos  3.3     11-22  Mg     1.7     11-22      Urinalysis Basic - ( 22 Nov 2024 04:08 )    Color: x / Appearance: x / SG: x / pH: x  Gluc: 335 mg/dL / Ketone: x  / Bili: x / Urobili: x   Blood: x / Protein: x / Nitrite: x   Leuk Esterase: x / RBC: x / WBC x   Sq Epi: x / Non Sq Epi: x / Bacteria: x    POCT Blood Glucose.: 351 mg/dL (11-22-24 @ 07:36)  POCT Blood Glucose.: 243 mg/dL (11-21-24 @ 23:11)  POCT Blood Glucose.: 296 mg/dL (11-21-24 @ 19:06)  POCT Blood Glucose.: 263 mg/dL (11-21-24 @ 18:46)  POCT Blood Glucose.: 229 mg/dL (11-21-24 @ 15:47)  POCT Blood Glucose.: 304 mg/dL (11-21-24 @ 11:08)

## 2024-11-22 NOTE — DISCHARGE NOTE PROVIDER - CARE PROVIDER_API CALL
Betty Cross  Endocrinology/Metab/Diabetes  [address] [place] [address]  Phone: [phone]  Fax: [phone]  Follow Up Time:     Saeid Marks  Infectious Disease  [address] [facility] [address] [facility] [address]  [place] [address]  Phone: [phone]  Fax: [phone]  Follow Up Time:

## 2024-11-22 NOTE — PROGRESS NOTE ADULT - ASSESSMENT
Recent PCI for unstable angina.  For reasons that are not clear, he is not on aspirin.  This was resumed today at 81 mg and should continue until 6 December when it can be discontinued.  Clopidogrel should continue for 1 year.  He is recommended cardiac rehab.   54M with PMHx HTN, HLD, DM, asthma, neuropathy, presented with right toe discoloration and leg swelling. S/p right partial 4th and 5th ray amputation.    Consulted for diabetes management  Home regimen: States he was on insulin pump prior to admission, last office notes have basal/bolus dosing  Current a1c: 9.1%  Outpatient Endocrinologist: Dr Cross    1. Uncontrolled DM with hyperglycemia  - Doses adjusted but did not receive because he was NPO  - Admelog 20 units TID (if still elevated at lunch, will increase)  - Lantus 42 units qhs  - Correction scale  - Has follow up with our office 12/31     2. Right foot wound  - S/p right partial 4th and 5th ray amputation  - S/p wound debridement with delay primary closure  - Care per podiatry    3. HLD  - Continue statin 54M with PMHx HTN, HLD, DM, asthma, neuropathy, presented with right toe discoloration and leg swelling. S/p right partial 4th and 5th ray amputation.    Consulted for diabetes management  Home regimen: States he was on insulin pump prior to admission, last office notes have basal/bolus dosing  Current a1c: 9.1%  Outpatient Endocrinologist: Dr Cross    1. Uncontrolled DM with hyperglycemia  - Doses adjusted but did not receive because he was NPO  - Increase admelog to 24 units TID  - Lantus increased to 42 units qhs for tonight  - Correction scale  - Has follow up with our office 12/31     2. Right foot wound  - S/p right partial 4th and 5th ray amputation  - S/p wound debridement with delay primary closure  - Care per podiatry    3. HLD  - Continue statin 54M with PMHx HTN, HLD, DM, asthma, neuropathy, presented with right toe discoloration and leg swelling. S/p right partial 4th and 5th ray amputation.    Consulted for diabetes management  Home regimen: States he was on insulin pump prior to admission, last office notes have basal/bolus dosing  Current a1c: 9.1%  Outpatient Endocrinologist: Dr Cross    1. Uncontrolled DM with hyperglycemia  - Doses adjusted yesterday but did not receive because he was NPO  - Increase admelog to 24 units TID  - Lantus increased to 42 units qhs for tonight  - Correction scale  - Has follow up with our office 12/31, he can call office if having hyperglycemia at home after discharge    2. Right foot wound  - S/p right partial 4th and 5th ray amputation  - S/p wound debridement with delay primary closure  - Care per podiatry    3. HLD  - Continue statin

## 2024-11-22 NOTE — PROGRESS NOTE ADULT - ASSESSMENT
A:  R foot necrotic 5th digit  S/P right partial 4th and 5th ray amputation  11/18    P:   Patient seen and evaluated at bedside and Chart reviewed.  leukocytosis present with vitals stable  Discussed diagnosis and treatment with patient  X-rays evaluated  CT reviewed- results noted above   Venous duplex negative   Culture and Intraop culture reviewed- Appreciate ID recommendations  Appreciate vascular recommendations  Betadine, dsd  Patient to keep the dressing clean, dry and intact  Patient to be nonweight bearing. Recommend staying in bed with leg elevated at all time.  Discussed importance of daily foot examinations and proper shoe gear and to importance of lower Fasting Blood Glucose levels.   Patient is stable from podiatry standpoint with no more surgical intervention needed at this time.  Upon discharge, patient can follow up at [address] [facility] or [facility]   Podiatry to follow while in house.  Seen, reviewed, treated with Dr. Valdivia   Discussed with Dr. Vidal A:  R foot necrotic 5th digit  S/P right partial 4th and 5th ray amputation  11/18    P:   Patient seen and evaluated at bedside and Chart reviewed.  leukocytosis present with vitals stable  Discussed diagnosis and treatment with patient  X-rays evaluated  CT reviewed- results noted above   Venous duplex negative   Culture and Intraop culture reviewed- Appreciate ID recommendations  Appreciate vascular recommendations  Betadine, dsd  Patient to keep the dressing clean, dry and intact  Patient to be nonweight bearing. Recommend staying in bed with leg elevated at all time.  Discussed importance of daily foot examinations and proper shoe gear and to importance of lower Fasting Blood Glucose levels.   Patient is stable from podiatry standpoint with no more surgical intervention needed at this time.  Upon discharge, patient can follow up with Dr. Vidal at [address] [facility] or [address] [facility]   Podiatry to follow while in house.  Seen, reviewed, treated with Dr. Valdivia   Discussed with Dr. Vidal

## 2024-11-22 NOTE — DISCHARGE NOTE NURSING/CASE MANAGEMENT/SOCIAL WORK - FINANCIAL ASSISTANCE
[facility] provides services at a reduced cost to those who are determined to be eligible through [facility]’s financial assistance program. Information regarding [facility]’s financial assistance program can be found by going to [email or web address] or by calling [phone].

## 2024-11-22 NOTE — PROGRESS NOTE ADULT - ASSESSMENT
54M who presented with continued right foot swelling and fifth toe discoloration. The patient reported that he had first noted a blister to the fifth toe two weeks prior and was evaluated by his primary care physician and podiatrist without further intervention. The patient reported that the blister opened with drainage of fluid. He denied any pain but reported that he had decreased sensation due to neuropathy. He did not have any fevers or chills but noted a malodorous odor from the foot.. He continued to put a dressing on the toe but the discoloration to the toe and swelling to the ankle did not improve. He denied any recent injury to the toe and denied any similar findings on the other toes.  AS ABOVE WITH NECROTIC CHANGES TO FOOT AS PER PHOTO OF PT  PT WITH FOUL SMELLING FOOT  AND WAS BROUGHT TO THE OR YESTERDAY 11/18  PT S/P 4TH AND 5TH TOE Amputation  PUS NOTED GANGRENOUS TOE REMOVED   OPERATIVE CX  STREP constellatus    OVERALL IMPROVED S/P OR AGAIN FOR CLOSURE  ALTHOUGH OPERATIVE CX ONLY STREP I AM CONCERNED ABOUT POLYMICROBIAL INFECTION   WILL CHANGE TO INVANZ  FOR ONCE A DAY DOSING  PICC LINE ORDERED PLAN 6 WEEKS IV ABX THROUGH DEC 30  WEEKLY CBC SED RATE CRP   WILL FOLLOW UP   WILL GIVE RX TO CASE MANAGEMENT

## 2024-11-22 NOTE — PHYSICAL THERAPY INITIAL EVALUATION ADULT - SIT-TO-STAND BALANCE
Subjective:      Patient ID: Lee Hendrix is a 53 y.o. male.    Chief Complaint: Medication Reaction    HPI     He had a problem getting on the citalopram as he states that he had fatigue with this and he states that he only had one pill and he quit it but he states that he has not felt good since then. He complains that he has chills feeling weak and he states that he has had low back pain noted.  He did see the heart doc after me and he was put on plavix, coreg and aspirin.  He has had DARK stools but they were not black. He is on ASA and plavix and they were just started by his heart doctor also.  He is not eating well now. halina bridges has back pain and he had frequent urination. He states that he went 16 x the other evening.  He states to me that he felt the same way when he was on plavix in the past and he was just put on plavix again recently by Dr. Matt.  He had been feeling good off of the plavix but he states that he is feeling just like when he was on the plavix in the past.      An EKG today shows a rate of 58 with a nonspecific intraventricular block that was reported on his ER visit at [place] last month.  This was new then but he has since had a heart cath.    The urinalysis is normal.   The CBC is normal.   Lab Results   Component Value Date    WBC 7.59 02/19/2018    HGB 15.4 02/19/2018    HCT 44.1 02/19/2018    MCV 83 02/19/2018     02/19/2018         Health Maintenance Due   Topic Date Due    Colonoscopy  01/04/2016       Past Medical History:  Past Medical History:   Diagnosis Date    Anxiety     Coronary artery disease     Hypertension     Kidney stone      Past Surgical History:   Procedure Laterality Date    CORONARY ARTERY BYPASS GRAFT N/A     4 vessel bypass 4/2014    KNEE SURGERY       Review of patient's allergies indicates:   Allergen Reactions    Amoxicillin Nausea And Vomiting     Current Outpatient Prescriptions on File Prior to Visit   Medication Sig Dispense Refill     ALPRAZolam (XANAX) 0.5 MG tablet Take 1 tablet (0.5 mg total) by mouth 2 (two) times daily as needed. 60 tablet 0    aspirin (ECOTRIN) 81 MG EC tablet Take 81 mg by mouth once daily.      carvedilol (COREG) 3.125 MG tablet Take 1 tablet (3.125 mg total) by mouth 2 (two) times daily with meals. 180 tablet 0    clopidogrel (PLAVIX) 75 mg tablet Take 75 mg by mouth once daily.      promethazine (PHENERGAN) 25 MG tablet Take 1 tablet (25 mg total) by mouth every 6 (six) hours as needed for Nausea. 6 tablet 0    rosuvastatin (CRESTOR) 40 MG Tab Take 1 tablet (40 mg total) by mouth once daily. 90 tablet 3    sodium,potassium,mag sulfates (SUPREP BOWEL PREP KIT) 17.5-3.13-1.6 gram SolR Take as instructed on prep sheet 354 mL 0    tamsulosin (FLOMAX) 0.4 mg Cp24 Take 1 capsule (0.4 mg total) by mouth once daily. 30 capsule 11    [DISCONTINUED] citalopram (CELEXA) 20 MG tablet Take 1 tablet (20 mg total) by mouth once daily. 30 tablet 11    [DISCONTINUED] ibuprofen (ADVIL,MOTRIN) 800 MG tablet Take 1 tablet (800 mg total) by mouth 3 (three) times daily. 270 tablet 1     No current facility-administered medications on file prior to visit.      Social History     Social History    Marital status:      Spouse name: N/A    Number of children: N/A    Years of education: N/A     Occupational History    Not on file.     Social History Main Topics    Smoking status: Never Smoker    Smokeless tobacco: Never Used    Alcohol use No    Drug use: No    Sexual activity: Yes     Partners: Female     Other Topics Concern    Not on file     Social History Narrative    No narrative on file     Family History   Problem Relation Age of Onset    Heart disease Father     Heart disease Paternal Grandmother     Stroke Neg Hx     Hypertension Neg Hx     Diabetes Neg Hx     Cancer Neg Hx        Review of Systems   Constitutional: Positive for chills. Negative for fever.   Respiratory: Negative for cough, shortness  of breath and wheezing.    Cardiovascular: Positive for chest pain. Negative for palpitations.   Musculoskeletal: Positive for back pain.   Neurological: Positive for weakness.       Objective:   /81   Pulse 70   Temp 97.9 °F (36.6 °C) (Oral)   Ht 6' (1.829 m)   Wt 96.4 kg (212 lb 8.4 oz)   BMI 28.82 kg/m²     Physical Exam   Constitutional: He is oriented to person, place, and time. He appears well-developed and well-nourished.   HENT:   Head: Normocephalic and atraumatic.   Right Ear: External ear normal.   Left Ear: External ear normal.   Nose: Nose normal.   Mouth/Throat: Oropharynx is clear and moist. No oropharyngeal exudate.   Eyes: Conjunctivae and EOM are normal. Pupils are equal, round, and reactive to light. Right eye exhibits no discharge. Left eye exhibits no discharge. No scleral icterus.   Neck: Normal range of motion. Neck supple. No JVD present. No thyromegaly present.   Cardiovascular: Normal rate, regular rhythm, normal heart sounds and intact distal pulses.  Exam reveals no gallop and no friction rub.    No murmur heard.  Pulmonary/Chest: Effort normal and breath sounds normal. No respiratory distress. He has no wheezes. He has no rales. He exhibits no tenderness.   Abdominal: Soft. Bowel sounds are normal. He exhibits no distension and no mass. There is no tenderness. There is no rebound and no guarding.   Musculoskeletal: Normal range of motion. He exhibits no edema or tenderness.   Lymphadenopathy:     He has no cervical adenopathy.   Neurological: He is alert and oriented to person, place, and time. No cranial nerve deficit. Coordination normal.   Skin: Skin is warm and dry. He is not diaphoretic.   Psychiatric: He has a normal mood and affect.       Assessment:     1. Weakness    2. Urinary frequency    3. Myalgia        Plan:   Lee was seen today for medication reaction.    Diagnoses and all orders for this visit:    Weakness  -     Urinalysis; Future  -     CBC auto  differential; Future  -     Comprehensive metabolic panel; Future  -     EKG 12-lead; Future  -     Urinalysis    Urinary frequency  -     Urinalysis; Future  -     Urinalysis  -     Ambulatory referral to Urology    Myalgia  -     CK; Future      I feel that he needs to be off of the plavix as he is feeling the same way that he was on the plavix in the past and he had an interim where he felt better and was placed back on it recently.  He has not felt good since then. Also, he was placed on crestor.  I want to confirm that he is not in rhabdomyolysis at this time.  I will check his CPK.   I placed a call to Dr. Matt's office to [place] and she will ask DR. Matt to see if he will consider changing from plavix to some other anticoagulant.  They will handle it from there.  Stop the flomax as it is not helping his symptoms.  See the urologist.   fair plus

## 2024-11-22 NOTE — DISCHARGE NOTE PROVIDER - CARE PROVIDERS DIRECT ADDRESSES
[email or web address]

## 2024-11-22 NOTE — PROGRESS NOTE ADULT - PROVIDER SPECIALTY LIST ADULT
Endocrinology
Endocrinology
Hospitalist
Podiatry
Endocrinology
Infectious Disease
Infectious Disease
Internal Medicine
Podiatry
Infectious Disease
Infectious Disease
Podiatry
Hospitalist

## 2024-11-22 NOTE — PROGRESS NOTE ADULT - NS ATTEND AMEND GEN_ALL_CORE FT
Sugars are high with meals, increase premeal as above
NPO today, numbers are still high, intensify the sliding scale for now. Increase the lantus as above and when diet restarts, increase ademolog as above
Did not get the insulin increments yesterday as was NPO and went to the ER. Increase insulin dose as above. Advise to discharge on the basal bolus regimen as insulin requirements are totally different from the pump

## 2024-11-22 NOTE — PROGRESS NOTE ADULT - ATTENDING COMMENTS
s/p right partial 4th and 5th ray amputation, then subsequent wound debridement/closure with Dr. Vidal  surgical site appears stable, no visible signs of infection  discussed the importance of NWB right foot  recommend abx therapy per ID recs  patient to follow up with Dr. Vidal outpatient  podiatry stable for d/c

## 2024-11-22 NOTE — PROGRESS NOTE ADULT - ASSESSMENT
54yoM with PMHx of hypertension, hyperlipidemia, diabetes, and asthma with neuropathy, who presented with a two week history of right toe discoloration and leg swelling with malodorous odor.    #Sepsis / Cellulitis  # toe necrosis   - s/p I&D with podiatry   - Xray of the foot: Diffuse soft tissue swelling with subcutaneous air. Suspect pathologic fracture of the fifth proximal phalanx. Second and third metatarsal bone diaphyseal periostitis. Pes planus. Suspect anterior talus have fracture  - s/p right foot partial 4th and 5th ray resection,  - Blood culture negative   - LE duplex to r/o DVT: negative   - ELLIE, adequate flow in both LE   - nonweight bearing  switch to IV invanz per ID until 12/30 via picc line    #Diabetes  - Metformin to be held  - holding insulin pump  sugars still high  - endocrine  following, managing FS    #Hypertension  - Close blood pressure monitoring  - On metoprolol and valsartan    #Anemia  - The patient reported a history of anemia in the past for which he was taking B12 and multivitamin  trend h/h  check iron panel    #OMID, likely pre-renal, resolved   - Cr 1.36, improved to 0.93  dc fluids as getting some edema     Dispo: home with home service once FS improved and IV abx set up

## 2024-11-22 NOTE — DISCHARGE NOTE NURSING/CASE MANAGEMENT/SOCIAL WORK - PATIENT PORTAL LINK FT
You can access the FollowMyHealth Patient Portal offered by [facility] by registering at the following website: [email or web address]. By joining [facility]’s FollowMyHealth portal, you will also be able to view your health information using other applications (apps) compatible with our system.

## 2024-11-22 NOTE — DISCHARGE NOTE PROVIDER - NSDCHHENCOUNTER_GEN_ALL_CORE
Electrodesiccation Text: The wound bed was treated with electrodesiccation after the biopsy was performed. 22-Nov-2024

## 2024-11-22 NOTE — DISCHARGE NOTE PROVIDER - DATE OF DISCHARGE SERVICE:
Patient scheduled one appointment this week due to carlos holiday.           Celso Hicks, PTA 22-Nov-2024

## 2024-11-22 NOTE — DISCHARGE NOTE PROVIDER - NSDCFUSCHEDAPPT_GEN_ALL_CORE_FT
Betty Cross  [facility]  ENDOCRIN [address]  Scheduled Appointment: 12/31/2024    Quintin Lennon  [facility]  GASTRO [address]  Scheduled Appointment: 01/14/2025    Yair Villagran  [facility]  INTMED [address] [facility]  Scheduled Appointment: 01/29/2025

## 2024-11-24 LAB
CULTURE RESULTS: ABNORMAL
CULTURE RESULTS: NO GROWTH — SIGNIFICANT CHANGE UP
SPECIMEN SOURCE: SIGNIFICANT CHANGE UP
SPECIMEN SOURCE: SIGNIFICANT CHANGE UP

## 2024-11-25 LAB
CULTURE RESULTS: SIGNIFICANT CHANGE UP
SPECIMEN SOURCE: SIGNIFICANT CHANGE UP

## 2024-11-27 ENCOUNTER — APPOINTMENT (OUTPATIENT)
Dept: PODIATRY | Facility: CLINIC | Age: 54
End: 2024-11-27
Payer: COMMERCIAL

## 2024-11-27 DIAGNOSIS — T81.329S: ICD-10-CM

## 2024-11-27 DIAGNOSIS — E11.40 TYPE 2 DIABETES MELLITUS WITH DIABETIC NEUROPATHY, UNSPECIFIED: ICD-10-CM

## 2024-11-27 DIAGNOSIS — R60.0 LOCALIZED EDEMA: ICD-10-CM

## 2024-11-27 DIAGNOSIS — T81.31XA DISRUPTION OF EXTERNAL OPERATION (SURGICAL) WOUND, NOT ELSEWHERE CLASSIFIED, INITIAL ENCOUNTER: ICD-10-CM

## 2024-11-27 LAB — SURGICAL PATHOLOGY STUDY: SIGNIFICANT CHANGE UP

## 2024-11-27 PROCEDURE — 99214 OFFICE O/P EST MOD 30 MIN: CPT | Mod: 24

## 2024-12-02 ENCOUNTER — NON-APPOINTMENT (OUTPATIENT)
Age: 54
End: 2024-12-02

## 2024-12-18 ENCOUNTER — APPOINTMENT (OUTPATIENT)
Dept: PODIATRY | Facility: CLINIC | Age: 54
End: 2024-12-18
Payer: COMMERCIAL

## 2024-12-18 DIAGNOSIS — T81.329S: ICD-10-CM

## 2024-12-18 DIAGNOSIS — T14.8XXA OTHER INJURY OF UNSPECIFIED BODY REGION, INITIAL ENCOUNTER: ICD-10-CM

## 2024-12-18 PROCEDURE — 97597 DBRDMT OPN WND 1ST 20 CM/<: CPT | Mod: RT,79

## 2024-12-18 PROCEDURE — 99213 OFFICE O/P EST LOW 20 MIN: CPT | Mod: 25,24

## 2024-12-19 ENCOUNTER — APPOINTMENT (OUTPATIENT)
Dept: INTERNAL MEDICINE | Facility: CLINIC | Age: 54
End: 2024-12-19
Payer: COMMERCIAL

## 2024-12-19 VITALS
BODY MASS INDEX: 41.75 KG/M2 | DIASTOLIC BLOOD PRESSURE: 80 MMHG | SYSTOLIC BLOOD PRESSURE: 135 MMHG | HEIGHT: 73 IN | WEIGHT: 315 LBS

## 2024-12-19 DIAGNOSIS — Z79.4 TYPE 2 DIABETES MELLITUS WITH HYPERGLYCEMIA: ICD-10-CM

## 2024-12-19 DIAGNOSIS — S91.301A UNSPECIFIED OPEN WOUND, RIGHT FOOT, INITIAL ENCOUNTER: ICD-10-CM

## 2024-12-19 DIAGNOSIS — E11.621 TYPE 2 DIABETES MELLITUS WITH FOOT ULCER: ICD-10-CM

## 2024-12-19 DIAGNOSIS — I10 ESSENTIAL (PRIMARY) HYPERTENSION: ICD-10-CM

## 2024-12-19 DIAGNOSIS — Z79.2 LONG TERM (CURRENT) USE OF ANTIBIOTICS: ICD-10-CM

## 2024-12-19 DIAGNOSIS — Z09 ENCOUNTER FOR FOLLOW-UP EXAMINATION AFTER COMPLETED TREATMENT FOR CONDITIONS OTHER THAN MALIGNANT NEOPLASM: ICD-10-CM

## 2024-12-19 DIAGNOSIS — M86.171 OTHER ACUTE OSTEOMYELITIS, RIGHT ANKLE AND FOOT: ICD-10-CM

## 2024-12-19 DIAGNOSIS — L97.411 TYPE 2 DIABETES MELLITUS WITH FOOT ULCER: ICD-10-CM

## 2024-12-19 DIAGNOSIS — T81.31XA DISRUPTION OF EXTERNAL OPERATION (SURGICAL) WOUND, NOT ELSEWHERE CLASSIFIED, INITIAL ENCOUNTER: ICD-10-CM

## 2024-12-19 DIAGNOSIS — S98.131A COMPLETE TRAUMATIC AMPUTATION OF ONE RIGHT LESSER TOE, INITIAL ENCOUNTER: ICD-10-CM

## 2024-12-19 DIAGNOSIS — E11.65 TYPE 2 DIABETES MELLITUS WITH HYPERGLYCEMIA: ICD-10-CM

## 2024-12-19 DIAGNOSIS — E11.40 TYPE 2 DIABETES MELLITUS WITH DIABETIC NEUROPATHY, UNSPECIFIED: ICD-10-CM

## 2024-12-19 PROCEDURE — 99214 OFFICE O/P EST MOD 30 MIN: CPT

## 2024-12-19 PROCEDURE — 99215 OFFICE O/P EST HI 40 MIN: CPT

## 2024-12-21 PROBLEM — Z09 HOSPITAL DISCHARGE FOLLOW-UP: Status: ACTIVE | Noted: 2024-12-21

## 2024-12-21 PROBLEM — S98.131A AMPUTATION OF TOE OF RIGHT FOOT: Status: ACTIVE | Noted: 2024-12-21

## 2024-12-21 PROBLEM — Z79.2 RECEIVING INTRAVENOUS ANTIBIOTIC TREATMENT AT HOME: Status: ACTIVE | Noted: 2024-12-21

## 2024-12-21 PROBLEM — M86.171 OTHER ACUTE OSTEOMYELITIS OF RIGHT FOOT: Status: ACTIVE | Noted: 2024-12-21

## 2024-12-23 DIAGNOSIS — M62.838 OTHER MUSCLE SPASM: ICD-10-CM

## 2024-12-23 RX ORDER — CYCLOBENZAPRINE HYDROCHLORIDE 5 MG/1
5 TABLET, FILM COATED ORAL
Qty: 15 | Refills: 0 | Status: ACTIVE | COMMUNITY
Start: 2024-12-23 | End: 1900-01-01

## 2024-12-30 ENCOUNTER — APPOINTMENT (OUTPATIENT)
Dept: PODIATRY | Facility: CLINIC | Age: 54
End: 2024-12-30
Payer: COMMERCIAL

## 2024-12-30 DIAGNOSIS — S98.131A COMPLETE TRAUMATIC AMPUTATION OF ONE RIGHT LESSER TOE, INITIAL ENCOUNTER: ICD-10-CM

## 2024-12-30 DIAGNOSIS — E11.40 TYPE 2 DIABETES MELLITUS WITH DIABETIC NEUROPATHY, UNSPECIFIED: ICD-10-CM

## 2024-12-30 DIAGNOSIS — S91.301A UNSPECIFIED OPEN WOUND, RIGHT FOOT, INITIAL ENCOUNTER: ICD-10-CM

## 2024-12-30 DIAGNOSIS — I87.2 VENOUS INSUFFICIENCY (CHRONIC) (PERIPHERAL): ICD-10-CM

## 2024-12-30 DIAGNOSIS — T81.329S: ICD-10-CM

## 2024-12-30 DIAGNOSIS — B35.1 TINEA UNGUIUM: ICD-10-CM

## 2024-12-30 DIAGNOSIS — M86.171 OTHER ACUTE OSTEOMYELITIS, RIGHT ANKLE AND FOOT: ICD-10-CM

## 2024-12-30 PROCEDURE — 99213 OFFICE O/P EST LOW 20 MIN: CPT | Mod: 25,24

## 2024-12-30 PROCEDURE — 11042 DBRDMT SUBQ TIS 1ST 20SQCM/<: CPT | Mod: RT,79

## 2024-12-30 PROCEDURE — 11721 DEBRIDE NAIL 6 OR MORE: CPT | Mod: 59,79

## 2024-12-30 RX ORDER — CLOTRIMAZOLE 10 MG/ML
1 SOLUTION TOPICAL
Qty: 60 | Refills: 3 | Status: ACTIVE | COMMUNITY
Start: 2024-12-30 | End: 1900-01-01

## 2025-01-06 ENCOUNTER — APPOINTMENT (OUTPATIENT)
Dept: ENDOCRINOLOGY | Facility: CLINIC | Age: 55
End: 2025-01-06

## 2025-01-13 ENCOUNTER — APPOINTMENT (OUTPATIENT)
Dept: PODIATRY | Facility: CLINIC | Age: 55
End: 2025-01-13
Payer: COMMERCIAL

## 2025-01-13 DIAGNOSIS — M79.89 OTHER SPECIFIED SOFT TISSUE DISORDERS: ICD-10-CM

## 2025-01-13 DIAGNOSIS — T81.329S: ICD-10-CM

## 2025-01-13 DIAGNOSIS — E11.40 TYPE 2 DIABETES MELLITUS WITH DIABETIC NEUROPATHY, UNSPECIFIED: ICD-10-CM

## 2025-01-13 DIAGNOSIS — M86.171 OTHER ACUTE OSTEOMYELITIS, RIGHT ANKLE AND FOOT: ICD-10-CM

## 2025-01-13 DIAGNOSIS — S91.301A UNSPECIFIED OPEN WOUND, RIGHT FOOT, INITIAL ENCOUNTER: ICD-10-CM

## 2025-01-13 PROCEDURE — 99204 OFFICE O/P NEW MOD 45 MIN: CPT | Mod: 25

## 2025-01-13 PROCEDURE — 97597 DBRDMT OPN WND 1ST 20 CM/<: CPT | Mod: RT

## 2025-01-13 PROCEDURE — 99213 OFFICE O/P EST LOW 20 MIN: CPT | Mod: 25

## 2025-01-13 PROCEDURE — 99214 OFFICE O/P EST MOD 30 MIN: CPT | Mod: 25

## 2025-01-13 PROCEDURE — 99024 POSTOP FOLLOW-UP VISIT: CPT | Mod: 25

## 2025-01-13 RX ORDER — INSULIN GLARGINE-YFGN 100 [IU]/ML
100 INJECTION, SOLUTION SUBCUTANEOUS AT BEDTIME
Qty: 1 | Refills: 0 | Status: ACTIVE | COMMUNITY
Start: 2025-01-13 | End: 1900-01-01

## 2025-01-14 ENCOUNTER — APPOINTMENT (OUTPATIENT)
Dept: GASTROENTEROLOGY | Facility: CLINIC | Age: 55
End: 2025-01-14

## 2025-01-15 ENCOUNTER — APPOINTMENT (OUTPATIENT)
Dept: ENDOCRINOLOGY | Facility: CLINIC | Age: 55
End: 2025-01-15
Payer: COMMERCIAL

## 2025-01-15 VITALS — SYSTOLIC BLOOD PRESSURE: 136 MMHG | DIASTOLIC BLOOD PRESSURE: 80 MMHG

## 2025-01-15 VITALS
WEIGHT: 315 LBS | SYSTOLIC BLOOD PRESSURE: 150 MMHG | OXYGEN SATURATION: 98 % | DIASTOLIC BLOOD PRESSURE: 80 MMHG | HEIGHT: 73 IN | HEART RATE: 83 BPM | BODY MASS INDEX: 41.75 KG/M2

## 2025-01-15 DIAGNOSIS — Z79.4 TYPE 2 DIABETES MELLITUS WITH HYPERGLYCEMIA: ICD-10-CM

## 2025-01-15 DIAGNOSIS — E78.5 HYPERLIPIDEMIA, UNSPECIFIED: ICD-10-CM

## 2025-01-15 DIAGNOSIS — I10 ESSENTIAL (PRIMARY) HYPERTENSION: ICD-10-CM

## 2025-01-15 DIAGNOSIS — E11.65 TYPE 2 DIABETES MELLITUS WITH HYPERGLYCEMIA: ICD-10-CM

## 2025-01-15 DIAGNOSIS — E66.01 MORBID (SEVERE) OBESITY DUE TO EXCESS CALORIES: ICD-10-CM

## 2025-01-15 LAB — GLUCOSE BLDC GLUCOMTR-MCNC: 171

## 2025-01-15 PROCEDURE — 82962 GLUCOSE BLOOD TEST: CPT

## 2025-01-15 PROCEDURE — 99214 OFFICE O/P EST MOD 30 MIN: CPT

## 2025-01-22 ENCOUNTER — APPOINTMENT (OUTPATIENT)
Dept: PODIATRY | Facility: CLINIC | Age: 55
End: 2025-01-22

## 2025-01-27 ENCOUNTER — APPOINTMENT (OUTPATIENT)
Dept: PODIATRY | Facility: CLINIC | Age: 55
End: 2025-01-27
Payer: COMMERCIAL

## 2025-01-27 ENCOUNTER — APPOINTMENT (OUTPATIENT)
Dept: ENDOCRINOLOGY | Facility: CLINIC | Age: 55
End: 2025-01-27

## 2025-01-27 DIAGNOSIS — S98.131A COMPLETE TRAUMATIC AMPUTATION OF ONE RIGHT LESSER TOE, INITIAL ENCOUNTER: ICD-10-CM

## 2025-01-27 DIAGNOSIS — E11.621 TYPE 2 DIABETES MELLITUS WITH FOOT ULCER: ICD-10-CM

## 2025-01-27 DIAGNOSIS — I87.2 VENOUS INSUFFICIENCY (CHRONIC) (PERIPHERAL): ICD-10-CM

## 2025-01-27 DIAGNOSIS — R42 DIZZINESS AND GIDDINESS: ICD-10-CM

## 2025-01-27 DIAGNOSIS — T81.329S: ICD-10-CM

## 2025-01-27 DIAGNOSIS — L97.411 TYPE 2 DIABETES MELLITUS WITH FOOT ULCER: ICD-10-CM

## 2025-01-27 DIAGNOSIS — R60.0 LOCALIZED EDEMA: ICD-10-CM

## 2025-01-27 PROCEDURE — 99213 OFFICE O/P EST LOW 20 MIN: CPT | Mod: 25,24

## 2025-01-27 PROCEDURE — 97597 DBRDMT OPN WND 1ST 20 CM/<: CPT | Mod: RT,79

## 2025-01-28 ENCOUNTER — APPOINTMENT (OUTPATIENT)
Dept: ENDOCRINOLOGY | Facility: CLINIC | Age: 55
End: 2025-01-28

## 2025-02-10 ENCOUNTER — RX RENEWAL (OUTPATIENT)
Age: 55
End: 2025-02-10

## 2025-02-10 ENCOUNTER — APPOINTMENT (OUTPATIENT)
Dept: PODIATRY | Facility: CLINIC | Age: 55
End: 2025-02-10
Payer: COMMERCIAL

## 2025-02-10 ENCOUNTER — APPOINTMENT (OUTPATIENT)
Dept: VASCULAR SURGERY | Facility: CLINIC | Age: 55
End: 2025-02-10

## 2025-02-10 VITALS
BODY MASS INDEX: 41.75 KG/M2 | SYSTOLIC BLOOD PRESSURE: 162 MMHG | HEIGHT: 73 IN | HEART RATE: 79 BPM | WEIGHT: 315 LBS | DIASTOLIC BLOOD PRESSURE: 98 MMHG | OXYGEN SATURATION: 98 %

## 2025-02-10 DIAGNOSIS — M86.171 OTHER ACUTE OSTEOMYELITIS, RIGHT ANKLE AND FOOT: ICD-10-CM

## 2025-02-10 DIAGNOSIS — R60.0 LOCALIZED EDEMA: ICD-10-CM

## 2025-02-10 DIAGNOSIS — E11.65 TYPE 2 DIABETES MELLITUS WITH HYPERGLYCEMIA: ICD-10-CM

## 2025-02-10 DIAGNOSIS — I87.2 VENOUS INSUFFICIENCY (CHRONIC) (PERIPHERAL): ICD-10-CM

## 2025-02-10 DIAGNOSIS — Z79.4 TYPE 2 DIABETES MELLITUS WITH HYPERGLYCEMIA: ICD-10-CM

## 2025-02-10 DIAGNOSIS — S91.301A UNSPECIFIED OPEN WOUND, RIGHT FOOT, INITIAL ENCOUNTER: ICD-10-CM

## 2025-02-10 DIAGNOSIS — S98.131A COMPLETE TRAUMATIC AMPUTATION OF ONE RIGHT LESSER TOE, INITIAL ENCOUNTER: ICD-10-CM

## 2025-02-10 DIAGNOSIS — E11.40 TYPE 2 DIABETES MELLITUS WITH DIABETIC NEUROPATHY, UNSPECIFIED: ICD-10-CM

## 2025-02-10 PROCEDURE — 99213 OFFICE O/P EST LOW 20 MIN: CPT | Mod: 25

## 2025-02-10 PROCEDURE — 99024 POSTOP FOLLOW-UP VISIT: CPT | Mod: 25

## 2025-02-10 PROCEDURE — 97597 DBRDMT OPN WND 1ST 20 CM/<: CPT | Mod: RT

## 2025-02-17 ENCOUNTER — APPOINTMENT (OUTPATIENT)
Dept: INTERNAL MEDICINE | Facility: CLINIC | Age: 55
End: 2025-02-17

## 2025-02-18 RX ORDER — INSULIN GLARGINE 100 [IU]/ML
100 INJECTION, SOLUTION SUBCUTANEOUS
Qty: 1 | Refills: 1 | Status: ACTIVE | COMMUNITY
Start: 2025-02-18 | End: 1900-01-01

## 2025-02-24 ENCOUNTER — APPOINTMENT (OUTPATIENT)
Dept: PODIATRY | Facility: CLINIC | Age: 55
End: 2025-02-24
Payer: COMMERCIAL

## 2025-02-24 DIAGNOSIS — L97.411 TYPE 2 DIABETES MELLITUS WITH FOOT ULCER: ICD-10-CM

## 2025-02-24 DIAGNOSIS — I87.2 VENOUS INSUFFICIENCY (CHRONIC) (PERIPHERAL): ICD-10-CM

## 2025-02-24 DIAGNOSIS — M86.171 OTHER ACUTE OSTEOMYELITIS, RIGHT ANKLE AND FOOT: ICD-10-CM

## 2025-02-24 DIAGNOSIS — L85.3 XEROSIS CUTIS: ICD-10-CM

## 2025-02-24 DIAGNOSIS — E11.621 TYPE 2 DIABETES MELLITUS WITH FOOT ULCER: ICD-10-CM

## 2025-02-24 DIAGNOSIS — S98.131A COMPLETE TRAUMATIC AMPUTATION OF ONE RIGHT LESSER TOE, INITIAL ENCOUNTER: ICD-10-CM

## 2025-02-24 PROCEDURE — 99213 OFFICE O/P EST LOW 20 MIN: CPT

## 2025-03-07 ENCOUNTER — APPOINTMENT (OUTPATIENT)
Dept: VASCULAR SURGERY | Facility: CLINIC | Age: 55
End: 2025-03-07

## 2025-03-07 VITALS
WEIGHT: 290 LBS | SYSTOLIC BLOOD PRESSURE: 145 MMHG | BODY MASS INDEX: 38.43 KG/M2 | HEIGHT: 73 IN | OXYGEN SATURATION: 97 % | HEART RATE: 81 BPM | DIASTOLIC BLOOD PRESSURE: 81 MMHG

## 2025-03-07 PROCEDURE — 99214 OFFICE O/P EST MOD 30 MIN: CPT

## 2025-03-17 ENCOUNTER — APPOINTMENT (OUTPATIENT)
Dept: PODIATRY | Facility: CLINIC | Age: 55
End: 2025-03-17
Payer: COMMERCIAL

## 2025-03-17 DIAGNOSIS — B35.1 TINEA UNGUIUM: ICD-10-CM

## 2025-03-17 DIAGNOSIS — S91.301A UNSPECIFIED OPEN WOUND, RIGHT FOOT, INITIAL ENCOUNTER: ICD-10-CM

## 2025-03-17 DIAGNOSIS — I89.0 LYMPHEDEMA, NOT ELSEWHERE CLASSIFIED: ICD-10-CM

## 2025-03-17 DIAGNOSIS — E11.40 TYPE 2 DIABETES MELLITUS WITH DIABETIC NEUROPATHY, UNSPECIFIED: ICD-10-CM

## 2025-03-17 PROCEDURE — 99213 OFFICE O/P EST LOW 20 MIN: CPT | Mod: 25

## 2025-03-17 PROCEDURE — 11721 DEBRIDE NAIL 6 OR MORE: CPT

## 2025-03-25 ENCOUNTER — APPOINTMENT (OUTPATIENT)
Dept: GASTROENTEROLOGY | Facility: CLINIC | Age: 55
End: 2025-03-25

## 2025-03-26 ENCOUNTER — RX RENEWAL (OUTPATIENT)
Age: 55
End: 2025-03-26

## 2025-03-31 ENCOUNTER — APPOINTMENT (OUTPATIENT)
Dept: INTERNAL MEDICINE | Facility: CLINIC | Age: 55
End: 2025-03-31
Payer: COMMERCIAL

## 2025-03-31 VITALS
SYSTOLIC BLOOD PRESSURE: 140 MMHG | DIASTOLIC BLOOD PRESSURE: 80 MMHG | WEIGHT: 300 LBS | HEART RATE: 83 BPM | OXYGEN SATURATION: 98 % | HEIGHT: 73 IN | BODY MASS INDEX: 39.76 KG/M2

## 2025-03-31 DIAGNOSIS — E78.5 HYPERLIPIDEMIA, UNSPECIFIED: ICD-10-CM

## 2025-03-31 DIAGNOSIS — I10 ESSENTIAL (PRIMARY) HYPERTENSION: ICD-10-CM

## 2025-03-31 DIAGNOSIS — E11.40 TYPE 2 DIABETES MELLITUS WITH DIABETIC NEUROPATHY, UNSPECIFIED: ICD-10-CM

## 2025-03-31 DIAGNOSIS — I87.2 VENOUS INSUFFICIENCY (CHRONIC) (PERIPHERAL): ICD-10-CM

## 2025-03-31 DIAGNOSIS — E66.01 MORBID (SEVERE) OBESITY DUE TO EXCESS CALORIES: ICD-10-CM

## 2025-03-31 DIAGNOSIS — I89.0 LYMPHEDEMA, NOT ELSEWHERE CLASSIFIED: ICD-10-CM

## 2025-03-31 DIAGNOSIS — S98.131A COMPLETE TRAUMATIC AMPUTATION OF ONE RIGHT LESSER TOE, INITIAL ENCOUNTER: ICD-10-CM

## 2025-03-31 PROCEDURE — 99214 OFFICE O/P EST MOD 30 MIN: CPT

## 2025-03-31 PROCEDURE — 36415 COLL VENOUS BLD VENIPUNCTURE: CPT

## 2025-03-31 PROCEDURE — G2211 COMPLEX E/M VISIT ADD ON: CPT | Mod: NC

## 2025-04-03 LAB
ALBUMIN SERPL ELPH-MCNC: 4.5 G/DL
ALP BLD-CCNC: 67 U/L
ALT SERPL-CCNC: 20 U/L
ANION GAP SERPL CALC-SCNC: 12 MMOL/L
AST SERPL-CCNC: 19 U/L
BASOPHILS # BLD AUTO: 0.03 K/UL
BASOPHILS NFR BLD AUTO: 0.5 %
BILIRUB SERPL-MCNC: 0.2 MG/DL
BUN SERPL-MCNC: 16 MG/DL
CALCIUM SERPL-MCNC: 9.5 MG/DL
CHLORIDE SERPL-SCNC: 105 MMOL/L
CHOLEST SERPL-MCNC: 121 MG/DL
CO2 SERPL-SCNC: 25 MMOL/L
CREAT SERPL-MCNC: 1.23 MG/DL
CREAT SPEC-SCNC: 255 MG/DL
EGFRCR SERPLBLD CKD-EPI 2021: 70 ML/MIN/1.73M2
EOSINOPHIL # BLD AUTO: 0.1 K/UL
EOSINOPHIL NFR BLD AUTO: 1.8 %
ESTIMATED AVERAGE GLUCOSE: 206 MG/DL
GLUCOSE SERPL-MCNC: 117 MG/DL
HBA1C MFR BLD HPLC: 8.8 %
HCT VFR BLD CALC: 41.9 %
HDLC SERPL-MCNC: 49 MG/DL
HGB BLD-MCNC: 13.3 G/DL
IMM GRANULOCYTES NFR BLD AUTO: 0.2 %
LDLC SERPL-MCNC: 57 MG/DL
LYMPHOCYTES # BLD AUTO: 2.69 K/UL
LYMPHOCYTES NFR BLD AUTO: 47.7 %
MAN DIFF?: NORMAL
MCHC RBC-ENTMCNC: 27.1 PG
MCHC RBC-ENTMCNC: 31.7 G/DL
MCV RBC AUTO: 85.3 FL
MICROALBUMIN 24H UR DL<=1MG/L-MCNC: 2 MG/DL
MICROALBUMIN/CREAT 24H UR-RTO: 8 MG/G
MONOCYTES # BLD AUTO: 0.34 K/UL
MONOCYTES NFR BLD AUTO: 6 %
NEUTROPHILS # BLD AUTO: 2.47 K/UL
NEUTROPHILS NFR BLD AUTO: 43.8 %
NONHDLC SERPL-MCNC: 71 MG/DL
PLATELET # BLD AUTO: 259 K/UL
POTASSIUM SERPL-SCNC: 4.2 MMOL/L
PROT SERPL-MCNC: 7.2 G/DL
RBC # BLD: 4.91 M/UL
RBC # FLD: 15.8 %
SODIUM SERPL-SCNC: 142 MMOL/L
TRIGL SERPL-MCNC: 69 MG/DL
VIT B12 SERPL-MCNC: 699 PG/ML
WBC # FLD AUTO: 5.64 K/UL

## 2025-04-15 ENCOUNTER — APPOINTMENT (OUTPATIENT)
Dept: ENDOCRINOLOGY | Facility: CLINIC | Age: 55
End: 2025-04-15
Payer: COMMERCIAL

## 2025-04-15 ENCOUNTER — APPOINTMENT (OUTPATIENT)
Dept: ENDOCRINOLOGY | Facility: CLINIC | Age: 55
End: 2025-04-15

## 2025-04-15 VITALS
DIASTOLIC BLOOD PRESSURE: 80 MMHG | OXYGEN SATURATION: 96 % | HEIGHT: 73 IN | HEART RATE: 93 BPM | SYSTOLIC BLOOD PRESSURE: 124 MMHG | BODY MASS INDEX: 41.62 KG/M2 | WEIGHT: 314 LBS

## 2025-04-15 DIAGNOSIS — E78.5 HYPERLIPIDEMIA, UNSPECIFIED: ICD-10-CM

## 2025-04-15 DIAGNOSIS — E66.01 MORBID (SEVERE) OBESITY DUE TO EXCESS CALORIES: ICD-10-CM

## 2025-04-15 DIAGNOSIS — I10 ESSENTIAL (PRIMARY) HYPERTENSION: ICD-10-CM

## 2025-04-15 LAB — GLUCOSE BLDC GLUCOMTR-MCNC: 75

## 2025-04-15 PROCEDURE — 99214 OFFICE O/P EST MOD 30 MIN: CPT

## 2025-04-15 PROCEDURE — 82962 GLUCOSE BLOOD TEST: CPT

## 2025-04-15 PROCEDURE — 95251 CONT GLUC MNTR ANALYSIS I&R: CPT

## 2025-04-15 RX ORDER — TIRZEPATIDE 2.5 MG/.5ML
2.5 INJECTION, SOLUTION SUBCUTANEOUS
Qty: 1 | Refills: 0 | Status: ACTIVE | COMMUNITY
Start: 2025-04-15 | End: 1900-01-01

## 2025-04-17 ENCOUNTER — APPOINTMENT (OUTPATIENT)
Dept: ENDOCRINOLOGY | Facility: CLINIC | Age: 55
End: 2025-04-17
Payer: COMMERCIAL

## 2025-04-17 DIAGNOSIS — Z79.4 TYPE 2 DIABETES MELLITUS WITH HYPERGLYCEMIA: ICD-10-CM

## 2025-04-17 DIAGNOSIS — E11.65 TYPE 2 DIABETES MELLITUS WITH HYPERGLYCEMIA: ICD-10-CM

## 2025-04-17 PROCEDURE — G0108 DIAB MANAGE TRN  PER INDIV: CPT

## 2025-04-21 ENCOUNTER — APPOINTMENT (OUTPATIENT)
Dept: PODIATRY | Facility: CLINIC | Age: 55
End: 2025-04-21
Payer: COMMERCIAL

## 2025-04-21 DIAGNOSIS — E11.40 TYPE 2 DIABETES MELLITUS WITH DIABETIC NEUROPATHY, UNSPECIFIED: ICD-10-CM

## 2025-04-21 DIAGNOSIS — I87.2 VENOUS INSUFFICIENCY (CHRONIC) (PERIPHERAL): ICD-10-CM

## 2025-04-21 DIAGNOSIS — S98.131A COMPLETE TRAUMATIC AMPUTATION OF ONE RIGHT LESSER TOE, INITIAL ENCOUNTER: ICD-10-CM

## 2025-04-21 DIAGNOSIS — T14.8XXA OTHER INJURY OF UNSPECIFIED BODY REGION, INITIAL ENCOUNTER: ICD-10-CM

## 2025-04-21 DIAGNOSIS — G62.9 POLYNEUROPATHY, UNSPECIFIED: ICD-10-CM

## 2025-04-21 PROCEDURE — 99214 OFFICE O/P EST MOD 30 MIN: CPT | Mod: 25

## 2025-04-21 PROCEDURE — 97597 DBRDMT OPN WND 1ST 20 CM/<: CPT

## 2025-04-21 RX ORDER — GABAPENTIN 300 MG/1
300 CAPSULE ORAL
Qty: 30 | Refills: 0 | Status: ACTIVE | COMMUNITY
Start: 2025-04-21 | End: 1900-01-01

## 2025-04-24 ENCOUNTER — RX RENEWAL (OUTPATIENT)
Age: 55
End: 2025-04-24

## 2025-04-30 ENCOUNTER — RX RENEWAL (OUTPATIENT)
Age: 55
End: 2025-04-30

## 2025-05-01 RX ORDER — SYRING-NEEDL,DISP,INSUL,0.3 ML 30 G X1/2"
30G X 1/2" SYRINGE, EMPTY DISPOSABLE MISCELLANEOUS
Qty: 1 | Refills: 1 | Status: ACTIVE | COMMUNITY
Start: 2025-04-30 | End: 1900-01-01

## 2025-05-05 ENCOUNTER — APPOINTMENT (OUTPATIENT)
Dept: PODIATRY | Facility: CLINIC | Age: 55
End: 2025-05-05
Payer: COMMERCIAL

## 2025-05-05 DIAGNOSIS — R60.0 LOCALIZED EDEMA: ICD-10-CM

## 2025-05-05 DIAGNOSIS — S98.131A COMPLETE TRAUMATIC AMPUTATION OF ONE RIGHT LESSER TOE, INITIAL ENCOUNTER: ICD-10-CM

## 2025-05-05 DIAGNOSIS — L97.529 NON-PRESSURE CHRONIC ULCER OF OTHER PART OF LEFT FOOT WITH UNSPECIFIED SEVERITY: ICD-10-CM

## 2025-05-05 DIAGNOSIS — E11.40 TYPE 2 DIABETES MELLITUS WITH DIABETIC NEUROPATHY, UNSPECIFIED: ICD-10-CM

## 2025-05-05 DIAGNOSIS — L97.509 NON-PRESSURE CHRONIC ULCER OF OTHER PART OF UNSPECIFIED FOOT WITH UNSPECIFIED SEVERITY: ICD-10-CM

## 2025-05-05 DIAGNOSIS — I87.2 VENOUS INSUFFICIENCY (CHRONIC) (PERIPHERAL): ICD-10-CM

## 2025-05-05 PROCEDURE — 99213 OFFICE O/P EST LOW 20 MIN: CPT | Mod: 25

## 2025-05-05 PROCEDURE — 97597 DBRDMT OPN WND 1ST 20 CM/<: CPT

## 2025-05-05 RX ORDER — GABAPENTIN 300 MG/1
300 CAPSULE ORAL
Qty: 30 | Refills: 0 | Status: ACTIVE | COMMUNITY
Start: 2025-05-05 | End: 1900-01-01

## 2025-05-07 ENCOUNTER — APPOINTMENT (OUTPATIENT)
Dept: PODIATRY | Facility: CLINIC | Age: 55
End: 2025-05-07
Payer: COMMERCIAL

## 2025-05-07 DIAGNOSIS — Z97.8 PRESENCE OF OTHER SPECIFIED DEVICES: ICD-10-CM

## 2025-05-07 PROCEDURE — ZZZZZ: CPT

## 2025-05-08 RX ORDER — UREA 200 MG/G
20 CREAM TOPICAL DAILY
Qty: 1 | Refills: 0 | Status: ACTIVE | COMMUNITY
Start: 2025-05-08 | End: 1900-01-01

## 2025-05-09 ENCOUNTER — APPOINTMENT (OUTPATIENT)
Dept: GASTROENTEROLOGY | Facility: CLINIC | Age: 55
End: 2025-05-09
Payer: COMMERCIAL

## 2025-05-09 VITALS
SYSTOLIC BLOOD PRESSURE: 128 MMHG | HEIGHT: 73 IN | BODY MASS INDEX: 41.08 KG/M2 | OXYGEN SATURATION: 97 % | HEART RATE: 88 BPM | WEIGHT: 310 LBS | DIASTOLIC BLOOD PRESSURE: 84 MMHG

## 2025-05-09 DIAGNOSIS — Z87.19 PERSONAL HISTORY OF OTHER DISEASES OF THE DIGESTIVE SYSTEM: ICD-10-CM

## 2025-05-09 DIAGNOSIS — K21.9 GASTRO-ESOPHAGEAL REFLUX DISEASE W/OUT ESOPHAGITIS: ICD-10-CM

## 2025-05-09 DIAGNOSIS — Z12.11 ENCOUNTER FOR SCREENING FOR MALIGNANT NEOPLASM OF COLON: ICD-10-CM

## 2025-05-09 DIAGNOSIS — F17.290 NICOTINE DEPENDENCE, OTHER TOBACCO PRODUCT, UNCOMPLICATED: ICD-10-CM

## 2025-05-09 DIAGNOSIS — E78.1 PURE HYPERGLYCERIDEMIA: ICD-10-CM

## 2025-05-09 DIAGNOSIS — Z87.09 PERSONAL HISTORY OF OTHER DISEASES OF THE RESPIRATORY SYSTEM: ICD-10-CM

## 2025-05-09 PROCEDURE — 99203 OFFICE O/P NEW LOW 30 MIN: CPT

## 2025-05-09 RX ORDER — POLYETHYLENE GLYCOL-3350 AND ELECTROLYTES WITH FLAVOR PACK 240; 5.84; 2.98; 6.72; 22.72 G/278.26G; G/278.26G; G/278.26G; G/278.26G; G/278.26G
240 POWDER, FOR SOLUTION ORAL
Qty: 1 | Refills: 0 | Status: ACTIVE | COMMUNITY
Start: 2025-05-09 | End: 1900-01-01

## 2025-05-29 ENCOUNTER — APPOINTMENT (OUTPATIENT)
Dept: ENDOCRINOLOGY | Facility: CLINIC | Age: 55
End: 2025-05-29
Payer: COMMERCIAL

## 2025-05-29 DIAGNOSIS — E11.65 TYPE 2 DIABETES MELLITUS WITH HYPERGLYCEMIA: ICD-10-CM

## 2025-05-29 DIAGNOSIS — Z79.4 TYPE 2 DIABETES MELLITUS WITH HYPERGLYCEMIA: ICD-10-CM

## 2025-05-29 PROCEDURE — G0108 DIAB MANAGE TRN  PER INDIV: CPT

## 2025-06-04 ENCOUNTER — APPOINTMENT (OUTPATIENT)
Dept: PODIATRY | Facility: CLINIC | Age: 55
End: 2025-06-04
Payer: COMMERCIAL

## 2025-06-04 DIAGNOSIS — I87.2 VENOUS INSUFFICIENCY (CHRONIC) (PERIPHERAL): ICD-10-CM

## 2025-06-04 DIAGNOSIS — R60.0 LOCALIZED EDEMA: ICD-10-CM

## 2025-06-04 DIAGNOSIS — L85.3 XEROSIS CUTIS: ICD-10-CM

## 2025-06-04 DIAGNOSIS — S98.131A COMPLETE TRAUMATIC AMPUTATION OF ONE RIGHT LESSER TOE, INITIAL ENCOUNTER: ICD-10-CM

## 2025-06-04 DIAGNOSIS — E11.40 TYPE 2 DIABETES MELLITUS WITH DIABETIC NEUROPATHY, UNSPECIFIED: ICD-10-CM

## 2025-06-04 DIAGNOSIS — L97.509 NON-PRESSURE CHRONIC ULCER OF OTHER PART OF UNSPECIFIED FOOT WITH UNSPECIFIED SEVERITY: ICD-10-CM

## 2025-06-04 PROCEDURE — 11721 DEBRIDE NAIL 6 OR MORE: CPT | Mod: 59

## 2025-06-04 PROCEDURE — 97597 DBRDMT OPN WND 1ST 20 CM/<: CPT

## 2025-06-04 PROCEDURE — 73630 X-RAY EXAM OF FOOT: CPT | Mod: RT

## 2025-06-04 PROCEDURE — 99213 OFFICE O/P EST LOW 20 MIN: CPT | Mod: 25

## 2025-06-04 RX ORDER — UREA 200 MG/G
20 CREAM TOPICAL DAILY
Qty: 1 | Refills: 3 | Status: ACTIVE | COMMUNITY
Start: 2025-06-04 | End: 1900-01-01

## 2025-06-04 RX ORDER — GABAPENTIN 300 MG/1
300 CAPSULE ORAL
Qty: 30 | Refills: 3 | Status: ACTIVE | COMMUNITY
Start: 2025-06-04 | End: 1900-01-01

## 2025-06-10 ENCOUNTER — RX RENEWAL (OUTPATIENT)
Age: 55
End: 2025-06-10

## 2025-06-23 ENCOUNTER — APPOINTMENT (OUTPATIENT)
Dept: INTERNAL MEDICINE | Facility: CLINIC | Age: 55
End: 2025-06-23

## 2025-06-25 ENCOUNTER — APPOINTMENT (OUTPATIENT)
Dept: PODIATRY | Facility: CLINIC | Age: 55
End: 2025-06-25

## 2025-06-30 ENCOUNTER — APPOINTMENT (OUTPATIENT)
Dept: PODIATRY | Facility: CLINIC | Age: 55
End: 2025-06-30
Payer: COMMERCIAL

## 2025-06-30 PROBLEM — M86.171 OTHER ACUTE OSTEOMYELITIS OF RIGHT FOOT: Status: RESOLVED | Noted: 2024-12-21 | Resolved: 2025-06-30

## 2025-06-30 PROBLEM — M20.41 HAMMERTOE OF SECOND TOE OF RIGHT FOOT: Status: ACTIVE | Noted: 2025-06-30

## 2025-06-30 PROCEDURE — 28010 INCISION OF TOE TENDON: CPT | Mod: T7

## 2025-06-30 PROCEDURE — 99213 OFFICE O/P EST LOW 20 MIN: CPT | Mod: 25

## 2025-06-30 RX ORDER — HYDROCORTISONE 1 %
12 CREAM (GRAM) TOPICAL TWICE DAILY
Qty: 1 | Refills: 3 | Status: ACTIVE | COMMUNITY
Start: 2025-06-30 | End: 1900-01-01

## 2025-07-09 ENCOUNTER — LABORATORY RESULT (OUTPATIENT)
Age: 55
End: 2025-07-09

## 2025-07-09 ENCOUNTER — APPOINTMENT (OUTPATIENT)
Dept: PODIATRY | Facility: CLINIC | Age: 55
End: 2025-07-09
Payer: COMMERCIAL

## 2025-07-09 PROCEDURE — 99213 OFFICE O/P EST LOW 20 MIN: CPT | Mod: 25

## 2025-07-09 PROCEDURE — 97597 DBRDMT OPN WND 1ST 20 CM/<: CPT

## 2025-07-09 RX ORDER — DOXYCYCLINE HYCLATE 100 MG/1
100 CAPSULE ORAL
Qty: 14 | Refills: 0 | Status: ACTIVE | COMMUNITY
Start: 2025-07-09 | End: 1900-01-01

## 2025-07-14 ENCOUNTER — RX RENEWAL (OUTPATIENT)
Age: 55
End: 2025-07-14

## 2025-07-16 ENCOUNTER — APPOINTMENT (OUTPATIENT)
Dept: PODIATRY | Facility: CLINIC | Age: 55
End: 2025-07-16
Payer: COMMERCIAL

## 2025-07-16 PROBLEM — E11.628 DIABETIC FOOT INFECTION: Status: RESOLVED | Noted: 2025-07-09 | Resolved: 2025-07-16

## 2025-07-16 PROCEDURE — 99213 OFFICE O/P EST LOW 20 MIN: CPT | Mod: 24

## 2025-07-21 ENCOUNTER — RX RENEWAL (OUTPATIENT)
Age: 55
End: 2025-07-21

## 2025-07-30 ENCOUNTER — APPOINTMENT (OUTPATIENT)
Dept: PODIATRY | Facility: CLINIC | Age: 55
End: 2025-07-30
Payer: COMMERCIAL

## 2025-07-30 DIAGNOSIS — R26.81 UNSTEADINESS ON FEET: ICD-10-CM

## 2025-07-30 DIAGNOSIS — T14.8XXA OTHER INJURY OF UNSPECIFIED BODY REGION, INITIAL ENCOUNTER: ICD-10-CM

## 2025-07-30 DIAGNOSIS — S98.131A COMPLETE TRAUMATIC AMPUTATION OF ONE RIGHT LESSER TOE, INITIAL ENCOUNTER: ICD-10-CM

## 2025-07-30 DIAGNOSIS — I87.2 VENOUS INSUFFICIENCY (CHRONIC) (PERIPHERAL): ICD-10-CM

## 2025-07-30 PROCEDURE — 97597 DBRDMT OPN WND 1ST 20 CM/<: CPT

## 2025-07-30 PROCEDURE — 99213 OFFICE O/P EST LOW 20 MIN: CPT | Mod: 25

## 2025-07-31 ENCOUNTER — APPOINTMENT (OUTPATIENT)
Dept: VASCULAR SURGERY | Facility: CLINIC | Age: 55
End: 2025-07-31
Payer: COMMERCIAL

## 2025-07-31 VITALS
SYSTOLIC BLOOD PRESSURE: 137 MMHG | OXYGEN SATURATION: 96 % | HEIGHT: 74 IN | HEART RATE: 80 BPM | BODY MASS INDEX: 38.5 KG/M2 | WEIGHT: 300 LBS | DIASTOLIC BLOOD PRESSURE: 84 MMHG

## 2025-07-31 DIAGNOSIS — I89.0 LYMPHEDEMA, NOT ELSEWHERE CLASSIFIED: ICD-10-CM

## 2025-07-31 PROCEDURE — 99214 OFFICE O/P EST MOD 30 MIN: CPT

## 2025-08-04 ENCOUNTER — RX RENEWAL (OUTPATIENT)
Age: 55
End: 2025-08-04

## 2025-08-06 ENCOUNTER — APPOINTMENT (OUTPATIENT)
Dept: INTERNAL MEDICINE | Facility: CLINIC | Age: 55
End: 2025-08-06
Payer: COMMERCIAL

## 2025-08-06 VITALS
HEART RATE: 78 BPM | DIASTOLIC BLOOD PRESSURE: 82 MMHG | BODY MASS INDEX: 40.06 KG/M2 | SYSTOLIC BLOOD PRESSURE: 121 MMHG | WEIGHT: 312 LBS | OXYGEN SATURATION: 98 %

## 2025-08-06 DIAGNOSIS — I10 ESSENTIAL (PRIMARY) HYPERTENSION: ICD-10-CM

## 2025-08-06 DIAGNOSIS — E11.40 TYPE 2 DIABETES MELLITUS WITH DIABETIC NEUROPATHY, UNSPECIFIED: ICD-10-CM

## 2025-08-06 DIAGNOSIS — E78.5 HYPERLIPIDEMIA, UNSPECIFIED: ICD-10-CM

## 2025-08-06 DIAGNOSIS — E55.9 VITAMIN D DEFICIENCY, UNSPECIFIED: ICD-10-CM

## 2025-08-06 DIAGNOSIS — E53.8 DEFICIENCY OF OTHER SPECIFIED B GROUP VITAMINS: ICD-10-CM

## 2025-08-06 PROCEDURE — 99214 OFFICE O/P EST MOD 30 MIN: CPT

## 2025-08-06 PROCEDURE — 36415 COLL VENOUS BLD VENIPUNCTURE: CPT

## 2025-08-06 PROCEDURE — G2211 COMPLEX E/M VISIT ADD ON: CPT | Mod: NC

## 2025-08-07 LAB
25(OH)D3 SERPL-MCNC: 65.9 NG/ML
ALBUMIN SERPL ELPH-MCNC: 4.5 G/DL
ALP BLD-CCNC: 66 U/L
ALT SERPL-CCNC: 23 U/L
ANION GAP SERPL CALC-SCNC: 13 MMOL/L
AST SERPL-CCNC: 21 U/L
BASOPHILS # BLD AUTO: 0.02 K/UL
BASOPHILS NFR BLD AUTO: 0.4 %
BILIRUB SERPL-MCNC: 0.3 MG/DL
BUN SERPL-MCNC: 23 MG/DL
CALCIUM SERPL-MCNC: 9.7 MG/DL
CHLORIDE SERPL-SCNC: 102 MMOL/L
CHOLEST SERPL-MCNC: 113 MG/DL
CO2 SERPL-SCNC: 26 MMOL/L
CREAT SERPL-MCNC: 1.04 MG/DL
EGFRCR SERPLBLD CKD-EPI 2021: 85 ML/MIN/1.73M2
EOSINOPHIL # BLD AUTO: 0.09 K/UL
EOSINOPHIL NFR BLD AUTO: 2 %
ESTIMATED AVERAGE GLUCOSE: 235 MG/DL
GLUCOSE SERPL-MCNC: 91 MG/DL
HBA1C MFR BLD HPLC: 9.8 %
HCT VFR BLD CALC: 38.2 %
HDLC SERPL-MCNC: 48 MG/DL
HGB BLD-MCNC: 12.6 G/DL
IMM GRANULOCYTES NFR BLD AUTO: 0.2 %
LDLC SERPL-MCNC: 54 MG/DL
LYMPHOCYTES # BLD AUTO: 1.68 K/UL
LYMPHOCYTES NFR BLD AUTO: 36.5 %
MAN DIFF?: NORMAL
MCHC RBC-ENTMCNC: 28.8 PG
MCHC RBC-ENTMCNC: 33 G/DL
MCV RBC AUTO: 87.4 FL
MONOCYTES # BLD AUTO: 0.32 K/UL
MONOCYTES NFR BLD AUTO: 7 %
NEUTROPHILS # BLD AUTO: 2.48 K/UL
NEUTROPHILS NFR BLD AUTO: 53.9 %
NONHDLC SERPL-MCNC: 65 MG/DL
PLATELET # BLD AUTO: 224 K/UL
POTASSIUM SERPL-SCNC: 4 MMOL/L
PROT SERPL-MCNC: 7.1 G/DL
RBC # BLD: 4.37 M/UL
RBC # FLD: 13.7 %
SODIUM SERPL-SCNC: 140 MMOL/L
TRIGL SERPL-MCNC: 46 MG/DL
TSH SERPL-ACNC: 0.92 UIU/ML
WBC # FLD AUTO: 4.6 K/UL

## 2025-08-20 ENCOUNTER — APPOINTMENT (OUTPATIENT)
Dept: PODIATRY | Facility: CLINIC | Age: 55
End: 2025-08-20
Payer: COMMERCIAL

## 2025-08-20 DIAGNOSIS — E11.40 TYPE 2 DIABETES MELLITUS WITH DIABETIC NEUROPATHY, UNSPECIFIED: ICD-10-CM

## 2025-08-20 PROCEDURE — 99213 OFFICE O/P EST LOW 20 MIN: CPT | Mod: 25

## 2025-08-20 PROCEDURE — 97597 DBRDMT OPN WND 1ST 20 CM/<: CPT | Mod: RT

## 2025-08-25 ENCOUNTER — APPOINTMENT (OUTPATIENT)
Dept: PODIATRY | Facility: CLINIC | Age: 55
End: 2025-08-25
Payer: COMMERCIAL

## 2025-08-25 DIAGNOSIS — S90.424A BLISTER (NONTHERMAL), RIGHT LESSER TOE(S), INITIAL ENCOUNTER: ICD-10-CM

## 2025-08-25 PROCEDURE — 97597 DBRDMT OPN WND 1ST 20 CM/<: CPT

## 2025-08-25 PROCEDURE — 99213 OFFICE O/P EST LOW 20 MIN: CPT | Mod: 25

## 2025-08-25 RX ORDER — CEFADROXIL 500 MG/1
500 CAPSULE ORAL TWICE DAILY
Qty: 10 | Refills: 0 | Status: ACTIVE | COMMUNITY
Start: 2025-08-25 | End: 1900-01-01

## 2025-08-25 RX ORDER — SILVER SULFADIAZINE 10 MG/G
1 CREAM TOPICAL DAILY
Qty: 100 | Refills: 0 | Status: ACTIVE | COMMUNITY
Start: 2025-08-25 | End: 1900-01-01

## 2025-08-28 ENCOUNTER — APPOINTMENT (OUTPATIENT)
Dept: ENDOCRINOLOGY | Facility: CLINIC | Age: 55
End: 2025-08-28
Payer: COMMERCIAL

## 2025-08-28 ENCOUNTER — NON-APPOINTMENT (OUTPATIENT)
Age: 55
End: 2025-08-28

## 2025-08-28 VITALS
OXYGEN SATURATION: 99 % | HEART RATE: 76 BPM | WEIGHT: 312 LBS | HEIGHT: 74 IN | BODY MASS INDEX: 40.04 KG/M2 | SYSTOLIC BLOOD PRESSURE: 150 MMHG | DIASTOLIC BLOOD PRESSURE: 91 MMHG

## 2025-08-28 DIAGNOSIS — E53.8 DEFICIENCY OF OTHER SPECIFIED B GROUP VITAMINS: ICD-10-CM

## 2025-08-28 DIAGNOSIS — E11.65 TYPE 2 DIABETES MELLITUS WITH HYPERGLYCEMIA: ICD-10-CM

## 2025-08-28 DIAGNOSIS — I10 ESSENTIAL (PRIMARY) HYPERTENSION: ICD-10-CM

## 2025-08-28 DIAGNOSIS — E78.5 HYPERLIPIDEMIA, UNSPECIFIED: ICD-10-CM

## 2025-08-28 DIAGNOSIS — Z79.4 TYPE 2 DIABETES MELLITUS WITH HYPERGLYCEMIA: ICD-10-CM

## 2025-08-28 PROCEDURE — G2211 COMPLEX E/M VISIT ADD ON: CPT | Mod: NC

## 2025-08-28 PROCEDURE — 99214 OFFICE O/P EST MOD 30 MIN: CPT

## 2025-08-28 PROCEDURE — 95251 CONT GLUC MNTR ANALYSIS I&R: CPT

## 2025-08-28 RX ORDER — TIRZEPATIDE 7.5 MG/.5ML
7.5 INJECTION, SOLUTION SUBCUTANEOUS
Qty: 1 | Refills: 0 | Status: ACTIVE | COMMUNITY
Start: 2025-08-28 | End: 1900-01-01

## 2025-09-03 ENCOUNTER — APPOINTMENT (OUTPATIENT)
Dept: MRI IMAGING | Facility: CLINIC | Age: 55
End: 2025-09-03
Payer: COMMERCIAL

## 2025-09-03 ENCOUNTER — APPOINTMENT (OUTPATIENT)
Dept: PODIATRY | Facility: CLINIC | Age: 55
End: 2025-09-03
Payer: COMMERCIAL

## 2025-09-03 ENCOUNTER — OUTPATIENT (OUTPATIENT)
Dept: OUTPATIENT SERVICES | Facility: HOSPITAL | Age: 55
LOS: 1 days | End: 2025-09-03

## 2025-09-03 DIAGNOSIS — L08.9 TYPE 2 DIABETES MELLITUS WITH OTHER SKIN COMPLICATIONS: ICD-10-CM

## 2025-09-03 DIAGNOSIS — L97.509 NON-PRESSURE CHRONIC ULCER OF OTHER PART OF UNSPECIFIED FOOT WITH UNSPECIFIED SEVERITY: ICD-10-CM

## 2025-09-03 DIAGNOSIS — L97.919 NON-PRESSURE CHRONIC ULCER OF UNSPECIFIED PART OF RIGHT LOWER LEG WITH UNSPECIFIED SEVERITY: ICD-10-CM

## 2025-09-03 DIAGNOSIS — S98.131A COMPLETE TRAUMATIC AMPUTATION OF ONE RIGHT LESSER TOE, INITIAL ENCOUNTER: ICD-10-CM

## 2025-09-03 DIAGNOSIS — E11.628 TYPE 2 DIABETES MELLITUS WITH OTHER SKIN COMPLICATIONS: ICD-10-CM

## 2025-09-03 PROCEDURE — 99214 OFFICE O/P EST MOD 30 MIN: CPT | Mod: 24

## 2025-09-03 PROCEDURE — 73718 MRI LOWER EXTREMITY W/O DYE: CPT | Mod: 26,RT

## 2025-09-08 ENCOUNTER — TRANSCRIPTION ENCOUNTER (OUTPATIENT)
Age: 55
End: 2025-09-08

## 2025-09-09 PROBLEM — E11.9 TYPE 2 DIABETES MELLITUS WITHOUT COMPLICATIONS: Chronic | Status: ACTIVE | Noted: 2025-09-03

## 2025-09-09 PROBLEM — G62.9 POLYNEUROPATHY, UNSPECIFIED: Chronic | Status: ACTIVE | Noted: 2025-09-03

## 2025-09-09 PROBLEM — E78.5 HYPERLIPIDEMIA, UNSPECIFIED: Chronic | Status: ACTIVE | Noted: 2025-09-03

## 2025-09-09 PROBLEM — J45.909 UNSPECIFIED ASTHMA, UNCOMPLICATED: Chronic | Status: ACTIVE | Noted: 2025-09-03

## 2025-09-09 PROBLEM — I10 ESSENTIAL (PRIMARY) HYPERTENSION: Chronic | Status: ACTIVE | Noted: 2025-09-03

## 2025-09-15 ENCOUNTER — APPOINTMENT (OUTPATIENT)
Dept: INFECTIOUS DISEASE | Facility: CLINIC | Age: 55
End: 2025-09-15

## 2025-09-15 ENCOUNTER — APPOINTMENT (OUTPATIENT)
Dept: PODIATRY | Facility: CLINIC | Age: 55
End: 2025-09-15
Payer: COMMERCIAL

## 2025-09-15 VITALS
WEIGHT: 312 LBS | OXYGEN SATURATION: 97 % | BODY MASS INDEX: 40.04 KG/M2 | DIASTOLIC BLOOD PRESSURE: 82 MMHG | HEIGHT: 74 IN | HEART RATE: 79 BPM | TEMPERATURE: 97.2 F | SYSTOLIC BLOOD PRESSURE: 130 MMHG | RESPIRATION RATE: 16 BRPM

## 2025-09-15 DIAGNOSIS — I87.2 VENOUS INSUFFICIENCY (CHRONIC) (PERIPHERAL): ICD-10-CM

## 2025-09-15 DIAGNOSIS — E11.40 TYPE 2 DIABETES MELLITUS WITH DIABETIC NEUROPATHY, UNSPECIFIED: ICD-10-CM

## 2025-09-15 DIAGNOSIS — S98.131A COMPLETE TRAUMATIC AMPUTATION OF ONE RIGHT LESSER TOE, INITIAL ENCOUNTER: ICD-10-CM

## 2025-09-15 DIAGNOSIS — L97.511 NON-PRESSURE CHRONIC ULCER OF OTHER PART OF RIGHT FOOT LIMITED TO BREAKDOWN OF SKIN: ICD-10-CM

## 2025-09-15 DIAGNOSIS — L08.9 TYPE 2 DIABETES MELLITUS WITH OTHER SKIN COMPLICATIONS: ICD-10-CM

## 2025-09-15 DIAGNOSIS — E11.628 TYPE 2 DIABETES MELLITUS WITH OTHER SKIN COMPLICATIONS: ICD-10-CM

## 2025-09-15 PROCEDURE — 97597 DBRDMT OPN WND 1ST 20 CM/<: CPT | Mod: 59

## 2025-09-15 PROCEDURE — 99214 OFFICE O/P EST MOD 30 MIN: CPT | Mod: 25

## 2025-09-15 RX ORDER — AMOXICILLIN AND CLAVULANATE POTASSIUM 875; 125 MG/1; MG/1
875-125 TABLET, COATED ORAL
Qty: 28 | Refills: 0 | Status: ACTIVE | COMMUNITY
Start: 2025-09-10 | End: 1900-01-01

## 2025-09-16 LAB
ALBUMIN SERPL ELPH-MCNC: 4.5 G/DL
ALP BLD-CCNC: 57 U/L
ALT SERPL-CCNC: 27 U/L
ANION GAP SERPL CALC-SCNC: 11 MMOL/L
AST SERPL-CCNC: 20 U/L
BASOPHILS # BLD AUTO: 0.03 K/UL
BASOPHILS NFR BLD AUTO: 0.5 %
BILIRUB SERPL-MCNC: 0.3 MG/DL
BUN SERPL-MCNC: 21 MG/DL
CALCIUM SERPL-MCNC: 10.5 MG/DL
CHLORIDE SERPL-SCNC: 104 MMOL/L
CO2 SERPL-SCNC: 25 MMOL/L
CREAT SERPL-MCNC: 1.06 MG/DL
CRP SERPL-MCNC: <3 MG/L
EGFRCR SERPLBLD CKD-EPI 2021: 83 ML/MIN/1.73M2
EOSINOPHIL # BLD AUTO: 0.1 K/UL
EOSINOPHIL NFR BLD AUTO: 1.8 %
ERYTHROCYTE [SEDIMENTATION RATE] IN BLOOD BY WESTERGREN METHOD: 19 MM/HR
GLUCOSE SERPL-MCNC: 104 MG/DL
HCT VFR BLD CALC: 38.9 %
HGB BLD-MCNC: 12.4 G/DL
IMM GRANULOCYTES NFR BLD AUTO: 0.2 %
LYMPHOCYTES # BLD AUTO: 2.13 K/UL
LYMPHOCYTES NFR BLD AUTO: 38.4 %
MAN DIFF?: NORMAL
MCHC RBC-ENTMCNC: 28.2 PG
MCHC RBC-ENTMCNC: 31.9 G/DL
MCV RBC AUTO: 88.6 FL
MONOCYTES # BLD AUTO: 0.4 K/UL
MONOCYTES NFR BLD AUTO: 7.2 %
NEUTROPHILS # BLD AUTO: 2.88 K/UL
NEUTROPHILS NFR BLD AUTO: 51.9 %
PLATELET # BLD AUTO: 246 K/UL
POTASSIUM SERPL-SCNC: 4.2 MMOL/L
PROT SERPL-MCNC: 7.2 G/DL
RBC # BLD: 4.39 M/UL
RBC # FLD: 14.5 %
SODIUM SERPL-SCNC: 140 MMOL/L
WBC # FLD AUTO: 5.55 K/UL

## (undated) DEVICE — SOL IRR POUR NS 0.9% 1000ML

## (undated) DEVICE — SAW BLADE LINVATEC OSCILLATING 25.4X90X1.20MM

## (undated) DEVICE — FRAZIER SUCTION TIP 10FR

## (undated) DEVICE — DRSG WEBRIL 6"

## (undated) DEVICE — GLV 7.5 PROTEXIS (WHITE)

## (undated) DEVICE — SAW BLADE STRYKER PRECISION THIN SAGITTAL MEDIUM 9MM X 25MM

## (undated) DEVICE — DRSG 4 X 8"

## (undated) DEVICE — PREP BETADINE KIT

## (undated) DEVICE — TOURNIQUET ESMARK 6"

## (undated) DEVICE — SUT VICRYL PLUS 0 27" CT-2 UNDYED

## (undated) DEVICE — WARMING BLANKET LOWER ADULT

## (undated) DEVICE — DRSG KERLIX ROLL LG 4.5"

## (undated) DEVICE — PACK EXTREMITY

## (undated) DEVICE — DRSG XEROFORM 5 X 9"

## (undated) DEVICE — VENODYNE/SCD SLEEVE CALF MEDIUM

## (undated) DEVICE — SUT VICRYL 2-0 27" CT-2 UNDYED

## (undated) DEVICE — SUT ETHILON 3-0 18" PS-1

## (undated) DEVICE — SOL IRR POUR H2O 1000ML

## (undated) DEVICE — DRSG ACE BANDAGE 6"

## (undated) DEVICE — SYR CONTROL LUER LOK 10CC

## (undated) DEVICE — GLV 7 PROTEXIS (WHITE)